# Patient Record
Sex: MALE | Race: WHITE | URBAN - NONMETROPOLITAN AREA
[De-identification: names, ages, dates, MRNs, and addresses within clinical notes are randomized per-mention and may not be internally consistent; named-entity substitution may affect disease eponyms.]

---

## 2017-03-06 ENCOUNTER — ANTI-COAG VISIT (OUTPATIENT)
Dept: FAMILY MEDICINE CLINIC | Age: 52
End: 2017-03-06

## 2017-04-17 ENCOUNTER — OFFICE VISIT (OUTPATIENT)
Dept: UROLOGY | Age: 52
End: 2017-04-17
Payer: COMMERCIAL

## 2017-04-17 VITALS
TEMPERATURE: 98.3 F | HEIGHT: 72 IN | HEART RATE: 87 BPM | BODY MASS INDEX: 28.71 KG/M2 | OXYGEN SATURATION: 95 % | WEIGHT: 212 LBS | SYSTOLIC BLOOD PRESSURE: 125 MMHG | DIASTOLIC BLOOD PRESSURE: 74 MMHG

## 2017-04-17 DIAGNOSIS — R31.29 MICROSCOPIC HEMATURIA: ICD-10-CM

## 2017-04-17 DIAGNOSIS — N40.2 PROSTATE NODULE: Primary | ICD-10-CM

## 2017-04-17 LAB
BACTERIA URINE, POC: NORMAL
BILIRUBIN URINE: 0 MG/DL
BLOOD, URINE: POSITIVE
CASTS URINE, POC: NORMAL
CLARITY: NORMAL
COLOR: NORMAL
CRYSTALS URINE, POC: NORMAL
EPI CELLS URINE, POC: NORMAL
GLUCOSE URINE: NORMAL
KETONES, URINE: NEGATIVE
LEUKOCYTE EST, POC: NEGATIVE
NITRITE, URINE: NEGATIVE
PH UA: 5.5 (ref 4.5–8)
PROTEIN UA: NEGATIVE
RBC URINE, POC: 0
SPECIFIC GRAVITY UA: 1.01 (ref 1–1.03)
UROBILINOGEN, URINE: NORMAL
WBC URINE, POC: NORMAL
YEAST URINE, POC: NORMAL

## 2017-04-17 PROCEDURE — 99244 OFF/OP CNSLTJ NEW/EST MOD 40: CPT | Performed by: UROLOGY

## 2017-04-17 PROCEDURE — 81000 URINALYSIS NONAUTO W/SCOPE: CPT | Performed by: UROLOGY

## 2017-04-17 RX ORDER — AMOXICILLIN 500 MG
CAPSULE ORAL
Refills: 6 | COMMUNITY
Start: 2017-03-29

## 2017-04-17 RX ORDER — VALSARTAN AND HYDROCHLOROTHIAZIDE 160; 25 MG/1; MG/1
TABLET ORAL
Refills: 11 | COMMUNITY
Start: 2017-03-21

## 2017-04-17 ASSESSMENT — ENCOUNTER SYMPTOMS
BLURRED VISION: 0
WHEEZING: 0
HEARTBURN: 0
DOUBLE VISION: 0
BACK PAIN: 0
SORE THROAT: 0
NAUSEA: 0
SHORTNESS OF BREATH: 0

## 2017-10-17 DIAGNOSIS — N40.2 PROSTATE NODULE: ICD-10-CM

## 2017-10-17 LAB — PROSTATE SPECIFIC ANTIGEN: 1.59 NG/ML (ref 0–4)

## 2017-10-23 ENCOUNTER — OFFICE VISIT (OUTPATIENT)
Dept: UROLOGY | Age: 52
End: 2017-10-23
Payer: COMMERCIAL

## 2017-10-23 VITALS
HEIGHT: 72 IN | DIASTOLIC BLOOD PRESSURE: 68 MMHG | WEIGHT: 216 LBS | OXYGEN SATURATION: 98 % | BODY MASS INDEX: 29.26 KG/M2 | TEMPERATURE: 98.2 F | HEART RATE: 97 BPM | SYSTOLIC BLOOD PRESSURE: 130 MMHG

## 2017-10-23 DIAGNOSIS — N40.2 PROSTATE NODULE: Primary | ICD-10-CM

## 2017-10-23 LAB
BACTERIA URINE, POC: NORMAL
BILIRUBIN URINE: 0 MG/DL
BLOOD, URINE: POSITIVE
CASTS URINE, POC: NORMAL
CLARITY: CLEAR
COLOR: YELLOW
CRYSTALS URINE, POC: NORMAL
EPI CELLS URINE, POC: NORMAL
GLUCOSE URINE: NORMAL
KETONES, URINE: NEGATIVE
LEUKOCYTE EST, POC: NORMAL
NITRITE, URINE: NEGATIVE
PH UA: 5 (ref 4.5–8)
PROTEIN UA: NEGATIVE
RBC URINE, POC: NORMAL
SPECIFIC GRAVITY UA: 1.02 (ref 1–1.03)
UROBILINOGEN, URINE: NORMAL
WBC URINE, POC: NORMAL
YEAST URINE, POC: NORMAL

## 2017-10-23 PROCEDURE — 81001 URINALYSIS AUTO W/SCOPE: CPT | Performed by: PHYSICIAN ASSISTANT

## 2017-10-23 PROCEDURE — 99213 OFFICE O/P EST LOW 20 MIN: CPT | Performed by: PHYSICIAN ASSISTANT

## 2017-10-23 ASSESSMENT — ENCOUNTER SYMPTOMS
BLURRED VISION: 0
DOUBLE VISION: 0
HEARTBURN: 0
SHORTNESS OF BREATH: 0
SORE THROAT: 0
NAUSEA: 0
WHEEZING: 0

## 2017-10-23 NOTE — PROGRESS NOTES
Alaina Baum is a 45 y.o. male who presents today   Chief Complaint   Patient presents with    Follow-up     I'm here for a f/u with PSA lab     Prostate Nodule:  Patient Who had seen Dr. Randolph Maria 04/17/17 who was referred by Dr. Alfonso Avendano for apparent prostate nodule on his exam last year's PSA was 1.1 and ana lilia to 2. 03/05/17 repeat PSA prior to this visit today is 1.59. Patient has had no voiding complaints or changes since last seen. Patient has not had a recent or previous prostate biopsy there is no personal family history of prostate cancer. Past Medical History:   Diagnosis Date    Cancer ([facility] [address]     leukemia    Hypertension        Past Surgical History:   Procedure Laterality Date    EYE SURGERY      TONSILLECTOMY         Current Outpatient Prescriptions   Medication Sig Dispense Refill    NIACIN FLUSH FREE 500 MG CAPS TK 1 C PO QD  6    valsartan-hydrochlorothiazide (DIOVAN-HCT) 160-25 MG per tablet TK 1 T PO QD  11    aspirin 81 MG tablet Take 81 mg by mouth daily      Omega-3 Fatty Acids (OMEGA 3 PO) Take by mouth       No current facility-administered medications for this visit. Allergies   Allergen Reactions    No Known Allergies        Social History     Social History    Marital status:      Spouse name: N/A    Number of children: N/A    Years of education: N/A     Social History Main Topics    Smoking status: Passive Smoke Exposure - Never Smoker    Smokeless tobacco: Never Used    Alcohol use No    Drug use: No    Sexual activity: Not Asked     Other Topics Concern    None     Social History Narrative    None       Family History   Problem Relation Age of Onset    Depression Mother     Diabetes Father     Stroke Maternal Uncle     Cancer Paternal Grandmother        REVIEW OF SYSTEMS:  Review of Systems   Constitutional: Negative for chills and fever. HENT: Negative for congestion and sore throat. Eyes: Negative for blurred vision and double vision.    Respiratory: Negative for shortness of breath and wheezing. Cardiovascular: Negative for chest pain and palpitations. Gastrointestinal: Negative for heartburn and nausea. Genitourinary: Negative for dysuria, flank pain, frequency, hematuria and urgency. Musculoskeletal: Negative for falls and neck pain. Skin: Negative for itching and rash. Neurological: Negative for dizziness and tingling. Endo/Heme/Allergies: Does not bruise/bleed easily. Psychiatric/Behavioral: The patient does not have insomnia. PHYSICAL EXAM:  /68   Pulse 97   Temp 98.2 °F (36.8 °C) (Temporal)   Ht 6' (1.829 m)   Wt 216 lb (98 kg)   SpO2 98%   BMI 29.29 kg/m²   Physical Exam   Constitutional: He is oriented to person, place, and time. He appears well-developed and well-nourished. No distress. HENT:   Head: Normocephalic. Mouth/Throat: No oropharyngeal exudate. Eyes: Left eye exhibits no discharge. No scleral icterus. Neck: Normal range of motion. Neck supple. No JVD present. No tracheal deviation present. No thyromegaly present. Cardiovascular: Normal rate and regular rhythm. Exam reveals no gallop and no friction rub. Pulmonary/Chest: Effort normal. No stridor. He has no rales. Abdominal: Soft. He exhibits no distension and no mass. There is no rebound and no guarding. Genitourinary: Rectum normal, prostate normal, testes normal and penis normal. Prostate is not enlarged. Musculoskeletal: Normal range of motion. He exhibits no edema. Neurological: He is alert and oriented to person, place, and time. No cranial nerve deficit. He exhibits normal muscle tone. Coordination normal.   Skin: Skin is warm and dry. He is not diaphoretic. No pallor. Psychiatric: He has a normal mood and affect. Nursing note and vitals reviewed.           DATA:  U/A:    Lab Results   Component Value Date    COLORU Yellow 10/23/2017    PROTEINU Negative 10/23/2017    PHUR 5.0 10/23/2017    RBCUA 0 04/17/2017    CLARITYU Clear

## 2018-02-16 ENCOUNTER — OFFICE VISIT (OUTPATIENT)
Dept: GASTROENTEROLOGY | Facility: CLINIC | Age: 53
End: 2018-02-16

## 2018-02-16 VITALS
HEART RATE: 72 BPM | WEIGHT: 217 LBS | SYSTOLIC BLOOD PRESSURE: 126 MMHG | OXYGEN SATURATION: 98 % | HEIGHT: 72 IN | DIASTOLIC BLOOD PRESSURE: 92 MMHG | BODY MASS INDEX: 29.39 KG/M2 | TEMPERATURE: 95 F

## 2018-02-16 DIAGNOSIS — Z83.71 FAMILY HX COLONIC POLYPS: ICD-10-CM

## 2018-02-16 DIAGNOSIS — Z86.010 HX OF COLONIC POLYP: Primary | ICD-10-CM

## 2018-02-16 DIAGNOSIS — I10 ESSENTIAL HYPERTENSION: ICD-10-CM

## 2018-02-16 DIAGNOSIS — Z80.0 FAMILY HX OF COLON CANCER: ICD-10-CM

## 2018-02-16 PROBLEM — Z83.719 FAMILY HX COLONIC POLYPS: Status: ACTIVE | Noted: 2018-02-16

## 2018-02-16 PROBLEM — Z86.0100 HX OF COLONIC POLYP: Status: ACTIVE | Noted: 2018-02-16

## 2018-02-16 PROCEDURE — S0285 CNSLT BEFORE SCREEN COLONOSC: HCPCS | Performed by: NURSE PRACTITIONER

## 2018-02-16 RX ORDER — POLYETHYLENE GLYCOL 3350, SODIUM CHLORIDE, SODIUM BICARBONATE, POTASSIUM CHLORIDE 420; 11.2; 5.72; 1.48 G/4L; G/4L; G/4L; G/4L
4000 POWDER, FOR SOLUTION ORAL SEE ADMIN INSTRUCTIONS
Qty: 4000 ML | Refills: 0 | Status: SHIPPED | OUTPATIENT
Start: 2018-02-16 | End: 2019-03-27

## 2018-02-16 RX ORDER — VALSARTAN AND HYDROCHLOROTHIAZIDE 160; 25 MG/1; MG/1
TABLET ORAL
COMMUNITY
Start: 2017-03-21

## 2018-02-16 RX ORDER — AMOXICILLIN 500 MG
CAPSULE ORAL
Refills: 5 | COMMUNITY
Start: 2018-01-16

## 2018-02-16 NOTE — PROGRESS NOTES
[facility] Gastroenterology    Primary Physician Lonny Simon MD    2/16/2018    Rico Enrique   1965      Chief Complaint   Patient presents with   • Colonoscopy       Subjective     HPI    Rico Enrique is a 52 y.o. male who presents as a referral for preventative maintenance. He has no complaints of nausea or vomiting. No change in bowels. No wt loss. No BRBPR. No melena. No abdominal pain.  Last colonoscopy was 2/2013, diverticulosis and hemorrhoids, recall rec 5 years.   The patient does have history of colon polyps. The patient  does not  have history of colon cancer.   There  Is  family history of colon polyps. There  Is  family history of colon cancer.     Past Medical History:   Diagnosis Date   • Abnormal liver enzymes    • Cholelithiasis    • Colon polyp    • Diverticulosis    • Enlarged prostate     dr rivera   • Hemorrhoids    • Hypertension    • Leukemia 2013    chronic lymphocytic, dr al    • Lymphoma 2013    small lymphoma, dr al        Past Surgical History:   Procedure Laterality Date   • COLONOSCOPY  02/15/2013   • EYE SURGERY     • TONSILLECTOMY AND ADENOIDECTOMY         Outpatient Prescriptions Marked as Taking for the 2/16/18 encounter (Office Visit) with DENNIS Bowden   Medication Sig Dispense Refill   • aspirin 81 MG tablet Take 81 mg by mouth.     • Glucosamine-Chondroitin (GLUCOSAMINE CHONDR COMPLEX PO) Take  by mouth Daily.     • NIACIN FLUSH FREE 500 MG capsule TK 1 C PO QD  5   • OMEGA-3 FATTY ACIDS PO Take  by mouth.     • valsartan-hydrochlorothiazide (DIOVAN-HCT) 160-25 MG per tablet TK 1 T PO QD         Allergies no known allergies    Social History     Social History   • Marital status:      Spouse name: N/A   • Number of children: N/A   • Years of education: N/A     Occupational History   • Not on file.     Social History Main Topics   • Smoking status: Never Smoker   • Smokeless tobacco: Never Used   • Alcohol use No      Comment: rare   • Drug  use: No   • Sexual activity: Defer     Other Topics Concern   • Not on file     Social History Narrative       Family History   Problem Relation Age of Onset   • Colon cancer Maternal Aunt    • Colon polyps Maternal Uncle    • Colon cancer Maternal Aunt    • Colon cancer Paternal Grandmother        Review of Systems   Constitutional: Negative for appetite change, chills, fatigue, fever and unexpected weight change.   HENT: Negative for sore throat and trouble swallowing.    Respiratory: Negative for cough, chest tightness, shortness of breath and wheezing.    Cardiovascular: Negative for chest pain and palpitations.   Gastrointestinal: Negative for abdominal distention, abdominal pain, anal bleeding, blood in stool, constipation, diarrhea, nausea, rectal pain and vomiting.        As mentioned in hpi   Genitourinary: Negative for difficulty urinating and hematuria.   Musculoskeletal: Negative for arthralgias and back pain.   Skin: Negative for color change and rash.   Neurological: Negative for dizziness, syncope, light-headedness and headaches.   Hematological: Positive for adenopathy.   Psychiatric/Behavioral: Negative for confusion. The patient is not nervous/anxious.        Objective     Vitals:    02/16/18 0826   BP: 126/92   Pulse: 72   Temp: 95 °F (35 °C)   SpO2: 98%     Last 2 weights    02/16/18  0826   Weight: 98.4 kg (217 lb)     Body mass index is 29.43 kg/(m^2).    Physical Exam   Constitutional: He appears well-developed and well-nourished. No distress.   HENT:   Head: Normocephalic and atraumatic.   Eyes: EOM are normal. No scleral icterus.   Neck: Neck supple. No JVD present.   Cardiovascular: Normal rate, regular rhythm and normal heart sounds.    Pulmonary/Chest: Effort normal and breath sounds normal. No stridor.   Abdominal: Soft. Bowel sounds are normal. He exhibits no distension and no mass. There is no tenderness. There is no rebound and no guarding.   Musculoskeletal: He exhibits no edema.    Neurological: He is alert.   Skin: Skin is warm and dry. No rash noted.   Psychiatric: He has a normal mood and affect. His behavior is normal.   Vitals reviewed.      Imaging Results (most recent)     None          Assessment/Plan     Rico was seen today for colonoscopy.    Diagnoses and all orders for this visit:    Hx of colonic polyp  -     Case Request; Standing  -     Case Request    Family hx of colon cancer  -     Case Request; Standing  -     Case Request    Family hx colonic polyps  -     Case Request; Standing  -     Case Request    Essential hypertension    Other orders  -     Implement Anesthesia Orders Day of Procedure; Standing  -     Obtain Informed Consent; Standing  -     polyethylene glycol-electrolytes (NULYTELY WITH FLAVOR PACKS) 420 g solution; Take 4,000 mL by mouth See Admin Instructions.      Plan for colonoscopy.  The patient was advised to take any blood pressure or heart  medications the morning of  procedure if that is when he/she normally takes.       Patient's BMI is above normal parameters. Follow-up plan includes:  no follow-up required.        COLONOSCOPY WITH ANESTHESIA (N/A)  All risks, benefits, alternatives, and indications of colonoscopy procedure have been discussed with the patient. Risks to include perforation of the colon requiring possible surgery or colostomy, risk of bleeding from biopsies or removal of colon tissue, possibility of missing a colon polyp or cancer, or adverse drug reaction.  Benefits to include the diagnosis and management of disease of the colon and rectum. Alternatives to include barium enema, radiographic evaluation, lab testing or no intervention. Pt verbalizes understanding and agrees.       DENNIS Lan      EMR Dragon/transcription disclaimer:  Much of this encounter note is electronic transcription/translation of spoken language to printed text.  The electronic translation of spoken language may be erroneous, or at times, nonsensical  words or phrases may be inadvertently transcribed.  Although I have reviewed the note for such errors, some may still exist.

## 2018-03-19 ENCOUNTER — TELEPHONE (OUTPATIENT)
Dept: GASTROENTEROLOGY | Facility: CLINIC | Age: 53
End: 2018-03-19

## 2018-03-19 ENCOUNTER — ANESTHESIA EVENT (OUTPATIENT)
Dept: GASTROENTEROLOGY | Facility: HOSPITAL | Age: 53
End: 2018-03-19

## 2018-03-19 ENCOUNTER — ANESTHESIA (OUTPATIENT)
Dept: GASTROENTEROLOGY | Facility: HOSPITAL | Age: 53
End: 2018-03-19

## 2018-03-19 ENCOUNTER — HOSPITAL ENCOUNTER (OUTPATIENT)
Facility: HOSPITAL | Age: 53
Setting detail: HOSPITAL OUTPATIENT SURGERY
Discharge: HOME | End: 2018-03-19
Attending: INTERNAL MEDICINE | Admitting: INTERNAL MEDICINE

## 2018-03-19 VITALS
SYSTOLIC BLOOD PRESSURE: 113 MMHG | HEART RATE: 81 BPM | HEIGHT: 72 IN | RESPIRATION RATE: 14 BRPM | WEIGHT: 216 LBS | BODY MASS INDEX: 29.26 KG/M2 | OXYGEN SATURATION: 97 % | TEMPERATURE: 97.9 F | DIASTOLIC BLOOD PRESSURE: 77 MMHG

## 2018-03-19 PROCEDURE — 25010000002 PROPOFOL 10 MG/ML EMULSION: Performed by: NURSE ANESTHETIST, CERTIFIED REGISTERED

## 2018-03-19 PROCEDURE — G0105 COLORECTAL SCRN; HI RISK IND: HCPCS | Performed by: INTERNAL MEDICINE

## 2018-03-19 RX ORDER — ONDANSETRON 2 MG/ML
4 INJECTION INTRAMUSCULAR; INTRAVENOUS ONCE AS NEEDED
Status: DISCONTINUED | OUTPATIENT
Start: 2018-03-19 | End: 2018-03-19 | Stop reason: HOSPADM

## 2018-03-19 RX ORDER — LIDOCAINE HYDROCHLORIDE 20 MG/ML
INJECTION, SOLUTION INFILTRATION; PERINEURAL AS NEEDED
Status: DISCONTINUED | OUTPATIENT
Start: 2018-03-19 | End: 2018-03-19 | Stop reason: SURG

## 2018-03-19 RX ORDER — PROPOFOL 10 MG/ML
VIAL (ML) INTRAVENOUS AS NEEDED
Status: DISCONTINUED | OUTPATIENT
Start: 2018-03-19 | End: 2018-03-19 | Stop reason: SURG

## 2018-03-19 RX ORDER — SODIUM CHLORIDE 9 MG/ML
500 INJECTION, SOLUTION INTRAVENOUS CONTINUOUS PRN
Status: DISCONTINUED | OUTPATIENT
Start: 2018-03-19 | End: 2018-03-19 | Stop reason: HOSPADM

## 2018-03-19 RX ORDER — SODIUM CHLORIDE 0.9 % (FLUSH) 0.9 %
3 SYRINGE (ML) INJECTION AS NEEDED
Status: DISCONTINUED | OUTPATIENT
Start: 2018-03-19 | End: 2018-03-19 | Stop reason: HOSPADM

## 2018-03-19 RX ADMIN — SODIUM CHLORIDE 500 ML: 9 INJECTION, SOLUTION INTRAVENOUS at 07:50

## 2018-03-19 RX ADMIN — LIDOCAINE HYDROCHLORIDE 100 MG: 20 INJECTION, SOLUTION INFILTRATION; PERINEURAL at 09:09

## 2018-03-19 RX ADMIN — LIDOCAINE HYDROCHLORIDE 0.5 ML: 10 INJECTION, SOLUTION EPIDURAL; INFILTRATION; INTRACAUDAL; PERINEURAL at 07:50

## 2018-03-19 RX ADMIN — SODIUM CHLORIDE: 9 INJECTION, SOLUTION INTRAVENOUS at 08:55

## 2018-03-19 RX ADMIN — PROPOFOL 200 MG: 10 INJECTION, EMULSION INTRAVENOUS at 09:09

## 2018-03-19 RX ADMIN — PROPOFOL 200 MG: 10 INJECTION, EMULSION INTRAVENOUS at 09:15

## 2018-03-19 NOTE — H&P
[facility] Gastroenterology  Pre Procedure History & Physical    Chief Complaint:   Colon polyps    Subjective     HPI:   The patient has a history of colon polyps who presents for exam.    Past Medical History:   Past Medical History:   Diagnosis Date   • Abnormal liver enzymes    • Cholelithiasis    • Colon polyp    • Diverticulosis    • Enlarged prostate     dr rivera   • Hemorrhoids    • Hypertension    • Leukemia 2013    chronic lymphocytic, dr al    • Lymphoma 2013    small lymphoma, dr al    • PONV (postoperative nausea and vomiting)        Past Surgical History:  Past Surgical History:   Procedure Laterality Date   • COLONOSCOPY  02/15/2013   • EYE SURGERY     • TONSILLECTOMY AND ADENOIDECTOMY         Family History:  Family History   Problem Relation Age of Onset   • Colon cancer Maternal Aunt    • Colon polyps Maternal Uncle    • Colon cancer Maternal Aunt    • Colon cancer Paternal Grandmother        Social History:   reports that he has never smoked. He has never used smokeless tobacco. He reports that he does not drink alcohol or use drugs.    Medications:   Prior to Admission medications    Medication Sig Start Date End Date Taking? Authorizing Provider   aspirin 81 MG tablet Take 81 mg by mouth.   Yes Historical Provider, MD   Glucosamine-Chondroitin (GLUCOSAMINE CHONDR COMPLEX PO) Take  by mouth Daily.   Yes Historical Provider, MD   NIACIN FLUSH FREE 500 MG capsule TK 1 C PO QD 1/16/18  Yes Historical Provider, MD   OMEGA-3 FATTY ACIDS PO Take  by mouth.   Yes Historical Provider, MD   polyethylene glycol-electrolytes (NULYTELY WITH FLAVOR PACKS) 420 g solution Take 4,000 mL by mouth See Admin Instructions. 2/16/18  Yes DENNIS Bowden   valsartan-hydrochlorothiazide (DIOVAN-HCT) 160-25 MG per tablet TK 1 T PO QD 3/21/17  Yes Historical Provider, MD       Allergies:  Review of patient's allergies indicates no known allergies.    ROS:    General: Weight stable  Resp: No  "SOA  Cardiovascular: No CP    Objective     Blood pressure 120/79, pulse 98, temperature 97.9 °F (36.6 °C), temperature source Temporal Artery , resp. rate 18, height 182.9 cm (72\"), weight 98 kg (216 lb), SpO2 96 %.    Physical Exam   Constitutional: Pt is oriented to person, place, and in no distress.   HENT: Mouth/Throat: Oropharynx is clear.   Cardiovascular: Normal rate, regular rhythm.    Pulmonary/Chest: Effort normal. No respiratory distress. No  wheezes.   Abdominal: Soft. Non-distended.  Skin: Skin is warm and dry.   Psychiatric: Mood, memory, affect and judgment appear normal.     Assessment/Plan     Diagnosis:  Colon polyps    Anticipated Surgical Procedure:  Colonoscopy    The risks, benefits, and alternatives of this procedure have been discussed with the patient or the responsible party- the patient understands and agrees to proceed.      EMR Dragon/transcription disclaimer:  Much of this encounter note is electronic transcription/translation of spoken language to printed text.  The electronic translation of spoken language may be erroneous, or at times, nonsensical words or phrases may be inadvertently transcribed.  Although I have reviewed the note for such errors, some may still exist.  "

## 2018-03-19 NOTE — ANESTHESIA POSTPROCEDURE EVALUATION
"Patient: Rico Enrique    Procedure Summary     Date:  03/19/18 Room / Location:  [facility] ENDOSCOPY 4 / [facility] ENDOSCOPY    Anesthesia Start:  0855 Anesthesia Stop:  0925    Procedure:  COLONOSCOPY WITH ANESTHESIA (N/A ) Diagnosis:       Family hx colonic polyps      Family hx of colon cancer      Hx of colonic polyp      (Family hx colonic polyps [Z83.71])      (Family hx of colon cancer [Z80.0])      (Hx of colonic polyp [Z86.010])    Surgeon:  Leonidas Ibanez MD Provider:  Kalpesh Arambula CRNA    Anesthesia Type:  general ASA Status:  2          Anesthesia Type: general  Last vitals  BP   120/79 (03/19/18 0738)   Temp   97.9 °F (36.6 °C) (03/19/18 0738)   Pulse   98 (03/19/18 0738)   Resp   18 (03/19/18 0738)     SpO2   96 % (03/19/18 0738)     Post Anesthesia Care and Evaluation    Patient location during evaluation: PACU  Patient participation: complete - patient participated  Level of consciousness: awake and alert  Pain management: adequate  Airway patency: patent  Anesthetic complications: No anesthetic complications    Cardiovascular status: acceptable  Respiratory status: acceptable  Hydration status: acceptable    Comments: Blood pressure 120/79, pulse 98, temperature 97.9 °F (36.6 °C), temperature source Temporal Artery , resp. rate 18, height 182.9 cm (72\"), weight 98 kg (216 lb), SpO2 96 %.    Pt discharged from PACU based on gail score >8      "

## 2018-03-19 NOTE — ANESTHESIA PREPROCEDURE EVALUATION
Anesthesia Evaluation     Patient summary reviewed   no history of anesthetic complications:  NPO Solid Status: > 8 hours             Airway   Mallampati: II  TM distance: >3 FB  Neck ROM: full  Dental          Pulmonary - negative pulmonary ROS   Cardiovascular   Exercise tolerance: excellent (>7 METS)    (+) hypertension,       Neuro/Psych- negative ROS  GI/Hepatic/Renal/Endo - negative ROS     Musculoskeletal     Abdominal    Substance History      OB/GYN          Other      history of cancer (leukemia)                    Anesthesia Plan    ASA 2     general     intravenous induction   Anesthetic plan and risks discussed with patient.

## 2018-04-20 DIAGNOSIS — N40.2 PROSTATE NODULE: ICD-10-CM

## 2018-04-20 LAB — PROSTATE SPECIFIC ANTIGEN: 2.07 NG/ML (ref 0–4)

## 2018-04-30 ENCOUNTER — OFFICE VISIT (OUTPATIENT)
Dept: UROLOGY | Age: 53
End: 2018-04-30
Payer: COMMERCIAL

## 2018-04-30 VITALS
BODY MASS INDEX: 29.53 KG/M2 | HEIGHT: 72 IN | SYSTOLIC BLOOD PRESSURE: 150 MMHG | HEART RATE: 96 BPM | DIASTOLIC BLOOD PRESSURE: 94 MMHG | WEIGHT: 218 LBS

## 2018-04-30 DIAGNOSIS — N40.1 BENIGN PROSTATIC HYPERPLASIA WITH LOWER URINARY TRACT SYMPTOMS, SYMPTOM DETAILS UNSPECIFIED: ICD-10-CM

## 2018-04-30 DIAGNOSIS — N40.2 PROSTATE NODULE: Primary | ICD-10-CM

## 2018-04-30 LAB
BACTERIA URINE, POC: ABNORMAL
BILIRUBIN URINE: 0 MG/DL
BLOOD, URINE: POSITIVE
CASTS URINE, POC: ABNORMAL
CLARITY: CLEAR
COLOR: YELLOW
CRYSTALS URINE, POC: ABNORMAL
EPI CELLS URINE, POC: ABNORMAL
GLUCOSE URINE: ABNORMAL
KETONES, URINE: NEGATIVE
LEUKOCYTE EST, POC: ABNORMAL
NITRITE, URINE: NEGATIVE
PH UA: 6.5 (ref 4.5–8)
PROTEIN UA: NEGATIVE
RBC URINE, POC: ABNORMAL
SPECIFIC GRAVITY UA: 1.01 (ref 1–1.03)
UROBILINOGEN, URINE: NORMAL
WBC URINE, POC: ABNORMAL
YEAST URINE, POC: ABNORMAL

## 2018-04-30 PROCEDURE — 81001 URINALYSIS AUTO W/SCOPE: CPT | Performed by: UROLOGY

## 2018-04-30 PROCEDURE — 99213 OFFICE O/P EST LOW 20 MIN: CPT | Performed by: UROLOGY

## 2018-04-30 ASSESSMENT — ENCOUNTER SYMPTOMS
EYE DISCHARGE: 0
EYE REDNESS: 0
WHEEZING: 0
SHORTNESS OF BREATH: 0
NAUSEA: 0
HEARTBURN: 0

## 2019-01-04 ENCOUNTER — TELEPHONE (OUTPATIENT)
Dept: OTOLARYNGOLOGY | Facility: CLINIC | Age: 54
End: 2019-01-04

## 2019-02-06 ENCOUNTER — OFFICE VISIT (OUTPATIENT)
Dept: OTOLARYNGOLOGY | Facility: CLINIC | Age: 54
End: 2019-02-06

## 2019-02-06 VITALS
DIASTOLIC BLOOD PRESSURE: 92 MMHG | RESPIRATION RATE: 12 BRPM | HEART RATE: 92 BPM | HEIGHT: 72 IN | WEIGHT: 219 LBS | TEMPERATURE: 98.1 F | SYSTOLIC BLOOD PRESSURE: 133 MMHG | BODY MASS INDEX: 29.66 KG/M2

## 2019-02-06 DIAGNOSIS — R59.0 CERVICAL LYMPHADENOPATHY: Primary | ICD-10-CM

## 2019-02-06 PROCEDURE — 99203 OFFICE O/P NEW LOW 30 MIN: CPT | Performed by: OTOLARYNGOLOGY

## 2019-02-06 RX ORDER — LOVASTATIN 20 MG/1
40 TABLET ORAL NIGHTLY
COMMUNITY

## 2019-02-06 NOTE — PROGRESS NOTES
Rico Hernandez MD     Chief Complaint   Patient presents with   • Lymphoma       History of Present Illness   Rico Enrique is a  53 y.o. male who presents for evaluation.  About a month ago he had a severe sore throat with dysphasia.  He did not have any airway complaints.  He had lymphadenopathy that developed from this.  He has some mild persisting lymphadenopathy in the right level 2 region.  He has a history of CLL and is being followed by Dr. Al.     Review of Systems  Reviewed per patient intake note    Past History:  Past Medical History:   Diagnosis Date   • Abnormal liver enzymes    • Cholelithiasis    • Colon polyp    • Diverticulosis    • Enlarged prostate     dr rivera   • Hemorrhoids    • Hypertension    • Leukemia (CMS/HCC) 2013    chronic lymphocytic, dr al    • Lymphoma (CMS/HCC) 2013    small lymphoma, dr al    • PONV (postoperative nausea and vomiting)      Past Surgical History:   Procedure Laterality Date   • COLONOSCOPY  02/15/2013   • COLONOSCOPY N/A 3/19/2018    Procedure: COLONOSCOPY WITH ANESTHESIA;  Surgeon: Leonidas Ibanez MD;  Location: [facility] ENDOSCOPY;  Service: Gastroenterology   • EYE SURGERY     • TONSILLECTOMY AND ADENOIDECTOMY       Family History   Problem Relation Age of Onset   • Colon cancer Maternal Aunt    • Colon polyps Maternal Uncle    • Colon cancer Maternal Aunt    • Colon cancer Paternal Grandmother      Social History     Tobacco Use   • Smoking status: Never Smoker   • Smokeless tobacco: Never Used   Substance Use Topics   • Alcohol use: No     Comment: rare   • Drug use: No     Outpatient Medications Marked as Taking for the 2/6/19 encounter (Office Visit) with Rico Hernandez MD   Medication Sig Dispense Refill   • aspirin 81 MG tablet Take 81 mg by mouth.     • Glucosamine-Chondroitin (GLUCOSAMINE CHONDR COMPLEX PO) Take  by mouth Daily.     • lovastatin (MEVACOR) 20 MG tablet Take 40 mg by mouth Every Night.     • NIACIN FLUSH FREE 500 MG  capsule TK 1 C PO QD  5   • OMEGA-3 FATTY ACIDS PO Take  by mouth.     • valsartan-hydrochlorothiazide (DIOVAN-HCT) 160-25 MG per tablet TK 1 T PO QD       Allergies:  Patient has no known allergies.    Vital Signs:   Temp:  [98.1 °F (36.7 °C)] 98.1 °F (36.7 °C)  Heart Rate:  [92] 92  Resp:  [12] 12  BP: (133)/(92) 133/92    Physical Exam:   CONSTITUTIONAL: well nourished, well-developed, alert, oriented, in no acute distress   COMMUNICATION AND VOICE: able to communicate normally, normal voice quality  HEAD: normocephalic, no lesions, atraumatic, no tenderness, no masses   FACE: appearance normal, no lesions, no tenderness, no deformities, facial motion symmetric  SALIVARY GLANDS: parotid glands with no tenderness, no swelling, no masses, submandibular glands with normal size, nontender  EYES: ocular motility normal, eyelids normal, orbits normal, no proptosis, conjunctiva normal , pupils equal, round  HEARING: response to conversational voice normal bilaterally   EXTERNAL EARS: auricles without lesions  EXTERNAL EAR CANALS: normal ear canals without stenosis or significant cerumen  TYMPANIC MEMBRANES: tympanic membrane appearance normal, no lesions, no perforation, normal mobility, no fluid  EXTERNAL NOSE: structure normal, no tenderness on palpation, no nasal discharge, no lesions, no evidence of trauma, nostrils patent  INTRANASAL EXAM: nasal mucosa normal, vestibule within normal limits, inferior turbinate normal,  nasal septum without overt anterior deviation  NASOPHARYNX: nasopharyngeal mucosa, adenoids within normal limits  LIPS: structure normal, no tenderness on palpation, no lesions, no evidence of trauma  TEETH: dentition within normal limits for age  GUMS: gingivae healthy  ORAL MUCOSA: oral mucosa normal, no mucosal lesions  FLOOR OF MOUTH: Warthin's duct patent, mucosa normal  TONGUE: lingual mucosa normal without lesions, normal tongue mobility  PALATE: soft and hard palates with normal mucosa and  structure  OROPHARYNX: oropharyngeal mucosa normal, tonsils absent with normal fossas  HYPOPHARYNX: hypopharyngeal mucosa normal  LARYNX: epiglottis and arytenoid cartilage within normal limits, vocal cord mucosa normal with normal mobility   NECK: neck appearance normal, no masses or tenderness  THYROID: no overt thyromegaly, no tenderness, nodules or mass present on palpation, position midline   LYMPH NODES: There is a benign-appearing well-circumscribed right level 2 lymph node is approximately 1.5 cm.  There is no suspicious adenopathy present  CHEST/RESPIRATORY: respiratory effort normal, normal breath sounds  CARDIOVASCULAR: rate and rhythm normal, extremities without cyanosis or edema, no overt jugulovenous distension present  NEUROLOGIC/PSYCHIATRIC: oriented appropriately for age, mood normal, affect appropriate, cranial nerves intact grossly unless specifically mentioned above     RESULTS REVIEW:    I have reviewed the patients old records in the chart.  The case was discussed with Dr San    Assessment   1. Cervical lymphadenopathy        Plan    Conservative management.  Will reevaluate in 4-6 weeks and if he still has symptoms or if there is enlargement, will consider CT scan of the neck to reevaluate.  However I feel this is likely reactive lymphadenopathy from his previous upper respiratory infection.    Return in about 6 weeks (around 3/20/2019).    Rico Hernandez MD  02/06/19  3:17 PM

## 2019-02-06 NOTE — PROGRESS NOTES
Keya Arnold   Patient Intake Note    Review of Systems  Review of Systems   Constitutional: Positive for fever. Negative for chills and fatigue.   HENT:        See HPI     Eyes: Negative for pain, discharge and itching.   Respiratory: Negative for cough, choking and shortness of breath.    Gastrointestinal: Negative for diarrhea, nausea and vomiting.   Musculoskeletal: Positive for neck pain and neck stiffness.   Neurological: Negative for dizziness, light-headedness and headaches.   Hematological: Does not bruise/bleed easily.   Psychiatric/Behavioral: Positive for sleep disturbance.   All other systems reviewed and are negative.      QUALITY MEASURES    Body Mass Index Screening and Follow-Up Plan  Body mass index is 29.7 kg/m².  Patient's Body mass index is 29.7 kg/m². BMI is above normal parameters. Recommendations include: referral to primary care.    Tobacco Use: Screening and Cessation Intervention  Social History    Tobacco Use      Smoking status: Never Smoker      Smokeless tobacco: Never Used        Keay Arnold  2/6/2019  2:16 PM

## 2019-03-05 ENCOUNTER — TRANSCRIBE ORDERS (OUTPATIENT)
Dept: ONCOLOGY | Facility: CLINIC | Age: 54
End: 2019-03-05

## 2019-03-05 DIAGNOSIS — C91.10 CHRONIC LYMPHOCYTIC LEUK OF B-CELL TYPE NOT ACHIEVE REMIS (HCC): Primary | ICD-10-CM

## 2019-03-21 ENCOUNTER — LAB (OUTPATIENT)
Dept: LAB | Facility: HOSPITAL | Age: 54
End: 2019-03-21

## 2019-03-21 DIAGNOSIS — C91.10 CHRONIC LYMPHOCYTIC LEUK OF B-CELL TYPE NOT ACHIEVE REMIS (HCC): ICD-10-CM

## 2019-03-21 LAB
DEPRECATED RDW RBC AUTO: 43.6 FL (ref 40–54)
ERYTHROCYTE [DISTWIDTH] IN BLOOD BY AUTOMATED COUNT: 13.2 % (ref 12–15)
HCT VFR BLD AUTO: 42.7 % (ref 40–52)
HGB BLD-MCNC: 14.7 G/DL (ref 14–18)
LYMPHOCYTES # BLD MANUAL: 35.38 10*3/MM3 (ref 0.72–4.86)
LYMPHOCYTES NFR BLD MANUAL: 90 % (ref 15–45)
MCH RBC QN AUTO: 31.1 PG (ref 28–32)
MCHC RBC AUTO-ENTMCNC: 34.4 G/DL (ref 33–36)
MCV RBC AUTO: 90.5 FL (ref 82–95)
NEUTROPHILS # BLD AUTO: 3.93 10*3/MM3 (ref 1.87–8.4)
NEUTROPHILS NFR BLD MANUAL: 10 % (ref 39–78)
NRBC BLD AUTO-RTO: 0 /100 WBC (ref 0–0)
PLAT MORPH BLD: NORMAL
PLATELET # BLD AUTO: 251 10*3/MM3 (ref 130–400)
PMV BLD AUTO: 9.6 FL (ref 6–12)
POIKILOCYTOSIS BLD QL SMEAR: ABNORMAL
RBC # BLD AUTO: 4.72 10*6/MM3 (ref 4.8–5.9)
SMUDGE CELLS BLD QL SMEAR: ABNORMAL
WBC NRBC COR # BLD: 39.31 10*3/MM3 (ref 4.8–10.8)

## 2019-03-21 PROCEDURE — 85007 BL SMEAR W/DIFF WBC COUNT: CPT

## 2019-03-21 PROCEDURE — 85025 COMPLETE CBC W/AUTO DIFF WBC: CPT

## 2019-03-21 PROCEDURE — 36415 COLL VENOUS BLD VENIPUNCTURE: CPT

## 2019-03-27 ENCOUNTER — OFFICE VISIT (OUTPATIENT)
Dept: OTOLARYNGOLOGY | Facility: CLINIC | Age: 54
End: 2019-03-27

## 2019-03-27 VITALS
DIASTOLIC BLOOD PRESSURE: 93 MMHG | TEMPERATURE: 97.8 F | SYSTOLIC BLOOD PRESSURE: 136 MMHG | WEIGHT: 220 LBS | HEIGHT: 72 IN | HEART RATE: 90 BPM | BODY MASS INDEX: 29.8 KG/M2

## 2019-03-27 DIAGNOSIS — R59.0 CERVICAL LYMPHADENOPATHY: Primary | ICD-10-CM

## 2019-03-27 PROCEDURE — 99213 OFFICE O/P EST LOW 20 MIN: CPT | Performed by: OTOLARYNGOLOGY

## 2019-03-27 NOTE — PROGRESS NOTES
Keya Arnold   Patient Intake Note    Review of Systems  Review of Systems   Constitutional: Positive for fatigue and fever. Negative for chills.   HENT:        See HPI   Eyes: Negative for pain, discharge and itching.   Respiratory: Negative for cough, choking and shortness of breath.    Gastrointestinal: Negative for diarrhea, nausea and vomiting.   Musculoskeletal: Negative for neck pain and neck stiffness.   Neurological: Negative for dizziness, light-headedness and headaches.   Psychiatric/Behavioral: Positive for sleep disturbance.   All other systems reviewed and are negative.      QUALITY MEASURES    Body Mass Index Screening and Follow-Up Plan  Body mass index is 29.83 kg/m².  Patient's Body mass index is 29.83 kg/m². BMI is above normal parameters. Recommendations include: referral to primary care.    Tobacco Use: Screening and Cessation Intervention  Social History    Tobacco Use      Smoking status: Never Smoker      Smokeless tobacco: Never Used        Keya Arnold  3/27/2019  4:04 PM

## 2019-03-27 NOTE — PROGRESS NOTES
Rico Hernandez MD     Chief Complaint   Patient presents with   • Follow-up        History of Present Illness  Rico Enrique is a  53 y.o. male who is here for follow up. He has not had growth of lymph nodes    Review of Systems  Reviewed per patient intake note    Past History:  Past medical and surgical history, family history and social history reviewed and updated when appropriate.  Current medications and allergies reviewed and updated when appropriate.  Allergies:  Patient has no known allergies.        Vital Signs:   Temp:  [97.8 °F (36.6 °C)] 97.8 °F (36.6 °C)  Heart Rate:  [90] 90  BP: (136)/(93) 136/93    Physical Exam:  CONSTITUTIONAL: well nourished, well-developed, alert, oriented, in no acute distress   COMMUNICATION AND VOICE: able to communicate normally, normal voice quality  HEAD: normocephalic, no lesions, atraumatic, no tenderness, no masses   FACE: appearance normal, no lesions, no tenderness, no deformities, facial motion symmetric  EYES: ocular motility normal, eyelids normal, orbits normal, no proptosis, conjunctiva normal , pupils equal, round  HEARING: response to conversational voice normal bilaterally   EXTERNAL EARS: auricles without lesions  EXTERNAL NOSE: structure normal, no tenderness on palpation, no nasal discharge, no lesions, no evidence of trauma, nostrils patent  LIPS: structure normal, no tenderness on palpation, no lesions, no evidence of trauma  NECK: neck appearance normal  LYMPHATIC: prominent right level II node 1.5 cm benign appearing  CHEST/RESPIRATORY: respiratory effort normal  CARDIOVASCULAR: extremities without cyanosis or edema, no overt jugulovenous distension present  NEUROLOGIC/PSYCHIATRIC: oriented appropriately for age, mood normal, affect appropriate, cranial nerves intact grossly unless specifically mentioned above          Assessment   1. Cervical lymphadenopathy        Plan    Medical and surgical options were discussed including observation and  biopsy. Risks, benefits and alternatives were discussed and questions were answered. After considering the options, the patient decided to proceed with observation.    Return in about 4 months (around 7/27/2019) for follow up with midlevel when I am in clinic.    Rico Hernandez MD  03/27/19  4:30 PM

## 2019-05-17 DIAGNOSIS — N40.1 BENIGN PROSTATIC HYPERPLASIA WITH LOWER URINARY TRACT SYMPTOMS, SYMPTOM DETAILS UNSPECIFIED: ICD-10-CM

## 2019-05-17 LAB — PROSTATE SPECIFIC ANTIGEN: 1.98 NG/ML (ref 0–4)

## 2019-05-23 ENCOUNTER — OFFICE VISIT (OUTPATIENT)
Dept: UROLOGY | Age: 54
End: 2019-05-23
Payer: COMMERCIAL

## 2019-05-23 VITALS — BODY MASS INDEX: 29.26 KG/M2 | HEIGHT: 72 IN | TEMPERATURE: 98.6 F | WEIGHT: 216 LBS

## 2019-05-23 DIAGNOSIS — N40.1 BENIGN PROSTATIC HYPERPLASIA WITH LOWER URINARY TRACT SYMPTOMS, SYMPTOM DETAILS UNSPECIFIED: Primary | ICD-10-CM

## 2019-05-23 DIAGNOSIS — R31.29 MICROHEMATURIA: ICD-10-CM

## 2019-05-23 LAB
BACTERIA URINE, POC: 0
BILIRUBIN URINE: 0 MG/DL
BLOOD, URINE: POSITIVE
CASTS URINE, POC: 0
CLARITY: CLEAR
COLOR: YELLOW
CRYSTALS URINE, POC: 0
EPI CELLS URINE, POC: 0
GLUCOSE URINE: ABNORMAL
KETONES, URINE: NEGATIVE
LEUKOCYTE EST, POC: ABNORMAL
NITRITE, URINE: NEGATIVE
PH UA: 5 (ref 4.5–8)
PROTEIN UA: NEGATIVE
RBC URINE, POC: 2
SPECIFIC GRAVITY UA: 1.03 (ref 1–1.03)
UROBILINOGEN, URINE: NORMAL
WBC URINE, POC: 1
YEAST URINE, POC: 0

## 2019-05-23 PROCEDURE — 81001 URINALYSIS AUTO W/SCOPE: CPT | Performed by: UROLOGY

## 2019-05-23 PROCEDURE — 99213 OFFICE O/P EST LOW 20 MIN: CPT | Performed by: UROLOGY

## 2019-05-23 RX ORDER — LOVASTATIN 20 MG/1
TABLET ORAL
Refills: 3 | COMMUNITY
Start: 2019-05-09

## 2019-05-23 ASSESSMENT — ENCOUNTER SYMPTOMS
CONSTIPATION: 0
SINUS PRESSURE: 0
EYE PAIN: 0
BLOOD IN STOOL: 0
FACIAL SWELLING: 0
VOMITING: 0
BACK PAIN: 0
NAUSEA: 0
ABDOMINAL PAIN: 0
EYE DISCHARGE: 0
EYE REDNESS: 0
ABDOMINAL DISTENTION: 0
SORE THROAT: 0
SHORTNESS OF BREATH: 0
COUGH: 0
RHINORRHEA: 0
WHEEZING: 0
COLOR CHANGE: 0
DIARRHEA: 0

## 2019-05-23 NOTE — LETTER
[facility]  [address] [place] [address] Drive, [address] [address]  Phone: [phone]  Fax: [phone]    Shyann Pinzon MD        May 23, 2019     Mary Evans, [address]      Patient: Selena Nam   MR Number: [id number]   YOB: 1965   Date of Visit: 5/23/2019       Dear Provider: Thank you for referring Divya Whitley to me for evaluation. Below are the relevant portions of my assessment and plan of care. If you have questions, please do not hesitate to call me. I look forward to following Betsy Burgess along with you.     Sincerely,        Shyann Pinzon MD

## 2019-05-23 NOTE — PROGRESS NOTES
John Irvin is a 52 y.o. male who presents today   Chief Complaint   Patient presents with    1 Year Follow Up     I'm here for a yearly f/u on BPH with PSA drawn       Benign Prostatic Hypertrophy  Patient complains of lower urinary tract symptoms. He reports frequency, intermittency, nocturia one time a night and weak stream. He denies straining and urgency. Patient states symptoms are of mild severity. Onset of symptoms was a few years ago and was gradual in onset. His AUA Symptom Score is, 7/35 manifested as irritative symptoms including nocturia and obstructive symptoms including weak stream, postvoid dribbling. He has no personal history and no family history of prostate cancer. He reports a history of no complicating symptoms. He denies flank pain, gross hematuria, kidney stones and recurrent UTI. Is on no medications. He had tried tamsulosin the past but stopped taking it due to side effects    Patient has long-standing history of microscopic hematuria. He's had prior workups which are negative. This is been unchanged    Past Medical History:   Diagnosis Date    Cancer ([facility] [address]     leukemia    Hypertension        Past Surgical History:   Procedure Laterality Date    EYE SURGERY      TONSILLECTOMY         Current Outpatient Medications   Medication Sig Dispense Refill    lovastatin (MEVACOR) 20 MG tablet   3    NIACIN FLUSH FREE 500 MG CAPS TK 1 C PO QD  6    valsartan-hydrochlorothiazide (DIOVAN-HCT) 160-25 MG per tablet TK 1 T PO QD  11    Omega-3 Fatty Acids (OMEGA 3 PO) Take by mouth       No current facility-administered medications for this visit.         Allergies   Allergen Reactions    No Known Allergies        Social History     Socioeconomic History    Marital status:      Spouse name: None    Number of children: None    Years of education: None    Highest education level: None   Occupational History    None   Social Needs    Financial resource strain: None    Food insecurity: Worry: None     Inability: None    Transportation needs:     Medical: None     Non-medical: None   Tobacco Use    Smoking status: Passive Smoke Exposure - Never Smoker    Smokeless tobacco: Never Used   Substance and Sexual Activity    Alcohol use: No    Drug use: No    Sexual activity: None   Lifestyle    Physical activity:     Days per week: None     Minutes per session: None    Stress: None   Relationships    Social connections:     Talks on phone: None     Gets together: None     Attends [employer] service: None     Active member of club or organization: None     Attends meetings of clubs or organizations: None     Relationship status: None    Intimate partner violence:     Fear of current or ex partner: None     Emotionally abused: None     Physically abused: None     Forced sexual activity: None   Other Topics Concern    None   Social History Narrative    None       Family History   Problem Relation Age of Onset    Depression Mother     Diabetes Father     Stroke Maternal Uncle     Cancer Paternal Grandmother        REVIEW OF SYSTEMS:  Review of Systems   Constitutional: Negative for activity change, chills, fatigue and fever. HENT: Negative for congestion, ear discharge, ear pain, facial swelling, mouth sores, rhinorrhea, sinus pressure and sore throat. Eyes: Negative for pain, discharge and redness. Respiratory: Negative for cough, shortness of breath and wheezing. Cardiovascular: Negative for chest pain, palpitations and leg swelling. Gastrointestinal: Negative for abdominal distention, abdominal pain, blood in stool, constipation, diarrhea, nausea and vomiting. Endocrine: Negative for polydipsia, polyphagia and polyuria. Genitourinary: Negative for decreased urine volume, difficulty urinating, dysuria, enuresis, flank pain, frequency, genital sores, hematuria and urgency.    Musculoskeletal: Negative for back pain, gait problem, joint swelling, neck pain and neck stiffness. Skin: Negative for color change, rash and wound. Allergic/Immunologic: Negative for environmental allergies and immunocompromised state. Neurological: Negative for dizziness, syncope, weakness, light-headedness, numbness and headaches. Psychiatric/Behavioral: Negative for agitation, confusion, dysphoric mood, self-injury, sleep disturbance and suicidal ideas. The patient is not hyperactive. PHYSICAL EXAM:  Temp 98.6 °F (37 °C) (Temporal)   Ht 6' (1.829 m)   Wt 216 lb (98 kg)   BMI 29.29 kg/m²   Physical Exam   Constitutional: He is oriented to person, place, and time. He appears well-developed and well-nourished. No distress. HENT:   Head: Normocephalic and atraumatic. Nose: Nose normal.   Eyes: Pupils are equal, round, and reactive to light. Conjunctivae and EOM are normal. No scleral icterus. Neck: Normal range of motion. Neck supple. No tracheal deviation present. Cardiovascular: Normal rate, regular rhythm and intact distal pulses. Pulmonary/Chest: Effort normal and breath sounds normal. No stridor. He exhibits no tenderness. Abdominal: Soft. Bowel sounds are normal. He exhibits no distension and no mass. There is no tenderness. Genitourinary: Rectum normal, testes normal and penis normal. Prostate is enlarged (30 g smooth no nodularity). Musculoskeletal: Normal range of motion. He exhibits no edema or tenderness. Lymphadenopathy:     He has no cervical adenopathy. Neurological: He is alert and oriented to person, place, and time. Skin: Skin is warm and dry. No erythema. Psychiatric: He has a normal mood and affect.  His behavior is normal. Judgment normal.           DATA:    Results for orders placed or performed in visit on 05/23/19   POCT Urinalysis Dipstick w/ Micro (Auto)   Result Value Ref Range    Color, UA Yellow     Clarity, UA Clear Clear    Glucose, Ur neg     Bilirubin Urine 0 mg/dL    Ketones, Urine Negative     Specific Gravity, UA 1.030 1.005 - 1. 030    Blood, Urine Positive     pH, UA 5.0 4.5 - 8.0    Protein, UA Negative Negative    Nitrite, Urine Negative     Leukocytes, UA neg     Urobilinogen, Urine Normal     rbc urine, poc 2     wbc urine, poc 1     bacteria urine, poc 0     yeast urine, poc 0     casts urine, poc 0     epi cells urine, poc 0     crystals urine, poc 0      Lab Results   Component Value Date    PSA 1.98 05/17/2019    PSA 2.07 04/20/2018    PSA 1.59 10/17/2017     1. Benign prostatic hyperplasia with lower urinary tract symptoms, symptom details unspecified  Symptoms are unchanged continue annual check  - POCT Urinalysis Dipstick w/ Micro (Auto)  - PSA, Diagnostic; Future    2. Microhematuria  Chronic for her workup negative  - POCT Urinalysis Dipstick w/ Micro (Auto)      Orders Placed This Encounter   Procedures    PSA, Diagnostic     In 1 year prior to the next visit     Standing Status:   Future     Standing Expiration Date:   5/23/2021    POCT Urinalysis Dipstick w/ Micro (Auto)        Return in about 1 year (around 5/23/2020) for PSA prior to vext visit. EMR Dragon/transcription disclaimer: Much of this documentt is electronic  transcription/translation of spoken language to printed text. The  electronic translation of spoken language may be erroneous, or at times,  nonsensical words or phrases may be inadvertently transcribed.  Although I  have reviewed the document for such errors, some may still exist.

## 2019-05-24 ENCOUNTER — NURSE TRIAGE (OUTPATIENT)
Dept: CALL CENTER | Facility: HOSPITAL | Age: 54
End: 2019-05-24

## 2019-06-20 ENCOUNTER — LAB (OUTPATIENT)
Dept: LAB | Facility: HOSPITAL | Age: 54
End: 2019-06-20

## 2019-06-20 DIAGNOSIS — C91.10 CHRONIC LYMPHOCYTIC LEUK OF B-CELL TYPE NOT ACHIEVE REMIS (HCC): ICD-10-CM

## 2019-06-20 LAB
ALBUMIN SERPL-MCNC: 4.7 G/DL (ref 3.5–5)
ALBUMIN/GLOB SERPL: 1.7 G/DL (ref 1.1–2.5)
ALP SERPL-CCNC: 101 U/L (ref 24–120)
ALT SERPL W P-5'-P-CCNC: 94 U/L (ref 0–54)
ANION GAP SERPL CALCULATED.3IONS-SCNC: 10 MMOL/L (ref 4–13)
AST SERPL-CCNC: 123 U/L (ref 7–45)
BILIRUB SERPL-MCNC: 2.2 MG/DL (ref 0.1–1)
BUN BLD-MCNC: 24 MG/DL (ref 5–21)
BUN/CREAT SERPL: 28.6 (ref 7–25)
CALCIUM SPEC-SCNC: 9.7 MG/DL (ref 8.4–10.4)
CHLORIDE SERPL-SCNC: 105 MMOL/L (ref 98–110)
CO2 SERPL-SCNC: 28 MMOL/L (ref 24–31)
CREAT BLD-MCNC: 0.84 MG/DL (ref 0.5–1.4)
DEPRECATED RDW RBC AUTO: 43.1 FL (ref 40–54)
ERYTHROCYTE [DISTWIDTH] IN BLOOD BY AUTOMATED COUNT: 13.2 % (ref 12–15)
GFR SERPL CREATININE-BSD FRML MDRD: 95 ML/MIN/1.73
GLOBULIN UR ELPH-MCNC: 2.8 GM/DL
GLUCOSE BLD-MCNC: 109 MG/DL (ref 70–100)
HCT VFR BLD AUTO: 44 % (ref 40–52)
HGB BLD-MCNC: 15.7 G/DL (ref 14–18)
HOLD SPECIMEN: NORMAL
LYMPHOCYTES # BLD MANUAL: 28.08 10*3/MM3 (ref 0.72–4.86)
LYMPHOCYTES NFR BLD MANUAL: 2 % (ref 4–12)
LYMPHOCYTES NFR BLD MANUAL: 64.6 % (ref 15–45)
MCH RBC QN AUTO: 31.9 PG (ref 28–32)
MCHC RBC AUTO-ENTMCNC: 35.7 G/DL (ref 33–36)
MCV RBC AUTO: 89.4 FL (ref 82–95)
MONOCYTES # BLD AUTO: 0.87 10*3/MM3 (ref 0.19–1.3)
NEUTROPHILS # BLD AUTO: 6.13 10*3/MM3 (ref 1.87–8.4)
NEUTROPHILS NFR BLD MANUAL: 14.1 % (ref 39–78)
PLAT MORPH BLD: NORMAL
PLATELET # BLD AUTO: 236 10*3/MM3 (ref 130–400)
PMV BLD AUTO: 9.7 FL (ref 6–12)
POTASSIUM BLD-SCNC: 4.3 MMOL/L (ref 3.5–5.3)
PROT SERPL-MCNC: 7.5 G/DL (ref 6.3–8.7)
RBC # BLD AUTO: 4.92 10*6/MM3 (ref 4.8–5.9)
RBC MORPH BLD: NORMAL
SODIUM BLD-SCNC: 143 MMOL/L (ref 135–145)
VARIANT LYMPHS NFR BLD MANUAL: 19.2 % (ref 0–5)
WBC MORPH BLD: NORMAL
WBC NRBC COR # BLD: 43.47 10*3/MM3 (ref 4.8–10.8)

## 2019-06-20 PROCEDURE — 85007 BL SMEAR W/DIFF WBC COUNT: CPT

## 2019-06-20 PROCEDURE — 36415 COLL VENOUS BLD VENIPUNCTURE: CPT | Performed by: INTERNAL MEDICINE

## 2019-06-20 PROCEDURE — 80053 COMPREHEN METABOLIC PANEL: CPT | Performed by: INTERNAL MEDICINE

## 2019-06-20 PROCEDURE — 85025 COMPLETE CBC W/AUTO DIFF WBC: CPT

## 2019-07-11 NOTE — PROGRESS NOTES
Rico Hernandez MD     Chief Complaint   Patient presents with   • Follow-up     cervical lymphadenopathy        History of Present Illness  Rico Enrique is a  53 y.o. male who is here for follow up. He has had a recent flair up of epistaxis. This was worse in the winter and better this summer The symptoms are localized to the right side.          Review of Systems  Reviewed per patient intake note    Past History:  Past medical and surgical history, family history and social history reviewed and updated when appropriate.  Current medications and allergies reviewed and updated when appropriate.  Allergies:  Patient has no known allergies.        Vital Signs:   Temp:  [97.3 °F (36.3 °C)] 97.3 °F (36.3 °C)  BP: (110)/(80) 110/80    Physical Exam:  CONSTITUTIONAL: well nourished, well-developed, alert, oriented, in no acute distress   COMMUNICATION AND VOICE: able to communicate normally, normal voice quality  HEAD: normocephalic, no lesions, atraumatic, no tenderness, no masses   FACE: appearance normal, no lesions, no tenderness, no deformities, facial motion symmetric  EYES: ocular motility normal, eyelids normal, orbits normal, no proptosis, conjunctiva normal , pupils equal, round  HEARING: response to conversational voice normal bilaterally   EXTERNAL EARS: auricles without lesions  EXTERNAL NOSE: structure normal, no tenderness on palpation, no nasal discharge, no lesions, no evidence of trauma, nostrils patent  INTRANASAL EXAM: right dilated vessels on the septum no mass or lesion  LIPS: structure normal, no tenderness on palpation, no lesions, no evidence of trauma  NECK: neck appearance normal, no overt lymphadenopathy   LYMPH NODES: no lymphadenopathy  CHEST/RESPIRATORY: respiratory effort normal  CARDIOVASCULAR: extremities without cyanosis or edema, no overt jugulovenous distension present  NEUROLOGIC/PSYCHIATRIC: oriented appropriately for age, mood normal, affect appropriate, cranial nerves intact  grossly unless specifically mentioned above          Assessment   1. Chronic rhinitis        Plan    Conservative management.          Return if symptoms worsen or fail to improve.    Rico Hernandez MD  07/12/19  9:35 AM

## 2019-07-12 ENCOUNTER — OFFICE VISIT (OUTPATIENT)
Dept: OTOLARYNGOLOGY | Facility: CLINIC | Age: 54
End: 2019-07-12

## 2019-07-12 VITALS
WEIGHT: 220 LBS | HEIGHT: 72 IN | BODY MASS INDEX: 29.8 KG/M2 | DIASTOLIC BLOOD PRESSURE: 80 MMHG | SYSTOLIC BLOOD PRESSURE: 110 MMHG | TEMPERATURE: 97.3 F

## 2019-07-12 DIAGNOSIS — J31.0 CHRONIC RHINITIS: Primary | ICD-10-CM

## 2019-07-12 PROCEDURE — 99213 OFFICE O/P EST LOW 20 MIN: CPT | Performed by: OTOLARYNGOLOGY

## 2019-07-12 NOTE — PATIENT INSTRUCTIONS
Saline nasal spray or saline gel to nose three times a day and as needed. Use a bedside humidifier at night. Place Vasoline in nose in the morning and at night.  Place Vasoline in nose in the morning and at night on the center part of the nose (septum) at least 15 minutes prior to the nasal spray. When using the nasal spray, aim towards the outer corner of the eye.

## 2019-07-12 NOTE — PROGRESS NOTES
Mag Faith CMA   Patient Intake Note    Review of Systems  Review of Systems   Constitutional: Positive for fatigue. Negative for chills and fever.   HENT:        SEE HPI   Respiratory: Negative for cough and shortness of breath.    Cardiovascular: Negative for chest pain.   Gastrointestinal: Negative for diarrhea and nausea.   Musculoskeletal: Negative for neck pain.   Neurological: Negative for dizziness.       QUALITY MEASURES    Tobacco Use: Screening and Cessation Intervention  Social History    Tobacco Use      Smoking status: Never Smoker      Smokeless tobacco: Never Used        Mag Faith CMA  7/12/2019  9:20 AM     Telephone Encounter by Erendira Garcia, RN, BSN at 06/16/17 10:29 AM     Author:  Erendira Garcia RN, BSN Service:  (none) Author Type:  Registered Nurse     Filed:  06/16/17 10:32 AM Encounter Date:  6/16/2017 Status:  Signed     :  Erendira Garcia, RN, BSN (Registered Nurse)            Late entry- Call was transferred to triage.  Pt had[CD1.1M] Right Lymph Node Biopsy[CD1.1C] yesterday     Pt doing well with no issues or problems.  Mother would like to know the following:  - When can dressing come off?  -When can pt shower?  - When can pt start to put ointment on incision?   Mother does not recall conversation with MD previously.     Fwd to Dr. Antunez- pls advise[CD1.1M]       Revision History        User Key Date/Time User Provider Type Action    > CD1.1 06/16/17 10:32 AM Erendira Garcia, RN, BSN Registered Nurse Sign    C - Copied, M - Manual

## 2019-08-27 DIAGNOSIS — C91.10 CLL (CHRONIC LYMPHOCYTIC LEUKEMIA) (HCC): Primary | ICD-10-CM

## 2019-09-26 DIAGNOSIS — D50.8 OTHER IRON DEFICIENCY ANEMIA: Primary | ICD-10-CM

## 2019-10-03 ENCOUNTER — LAB (OUTPATIENT)
Dept: LAB | Facility: HOSPITAL | Age: 54
End: 2019-10-03

## 2019-10-03 DIAGNOSIS — D50.8 OTHER IRON DEFICIENCY ANEMIA: ICD-10-CM

## 2019-10-03 LAB
ALBUMIN SERPL-MCNC: 4.4 G/DL (ref 3.5–5.2)
ALBUMIN/GLOB SERPL: 1.8 G/DL
ALP SERPL-CCNC: 106 U/L (ref 39–117)
ALT SERPL W P-5'-P-CCNC: 71 U/L (ref 1–41)
ANION GAP SERPL CALCULATED.3IONS-SCNC: 13 MMOL/L (ref 5–15)
AST SERPL-CCNC: 74 U/L (ref 1–40)
BILIRUB SERPL-MCNC: 1.7 MG/DL (ref 0.2–1.2)
BUN BLD-MCNC: 21 MG/DL (ref 6–20)
BUN/CREAT SERPL: 25.3 (ref 7–25)
CALCIUM SPEC-SCNC: 9.2 MG/DL (ref 8.6–10.5)
CHLORIDE SERPL-SCNC: 99 MMOL/L (ref 98–107)
CO2 SERPL-SCNC: 28 MMOL/L (ref 22–29)
CREAT BLD-MCNC: 0.83 MG/DL (ref 0.76–1.27)
DEPRECATED RDW RBC AUTO: 44.5 FL (ref 37–54)
ERYTHROCYTE [DISTWIDTH] IN BLOOD BY AUTOMATED COUNT: 13.2 % (ref 12.3–15.4)
GFR SERPL CREATININE-BSD FRML MDRD: 96 ML/MIN/1.73
GLOBULIN UR ELPH-MCNC: 2.4 GM/DL
GLUCOSE BLD-MCNC: 121 MG/DL (ref 65–99)
HCT VFR BLD AUTO: 43 % (ref 37.5–51)
HGB BLD-MCNC: 14.8 G/DL (ref 13–17.7)
LYMPHOCYTES # BLD MANUAL: 39.12 10*3/MM3 (ref 0.7–3.1)
LYMPHOCYTES NFR BLD MANUAL: 1 % (ref 5–12)
LYMPHOCYTES NFR BLD MANUAL: 95 % (ref 19.6–45.3)
MCH RBC QN AUTO: 32 PG (ref 26.6–33)
MCHC RBC AUTO-ENTMCNC: 34.4 G/DL (ref 31.5–35.7)
MCV RBC AUTO: 93.1 FL (ref 79–97)
MONOCYTES # BLD AUTO: 0.41 10*3/MM3 (ref 0.1–0.9)
NEUTROPHILS # BLD AUTO: 1.65 10*3/MM3 (ref 1.7–7)
NEUTROPHILS NFR BLD MANUAL: 4 % (ref 42.7–76)
PLAT MORPH BLD: NORMAL
PLATELET # BLD AUTO: 221 10*3/MM3 (ref 140–450)
PMV BLD AUTO: 9.7 FL (ref 6–12)
POIKILOCYTOSIS BLD QL SMEAR: ABNORMAL
POTASSIUM BLD-SCNC: 3.5 MMOL/L (ref 3.5–5.2)
PROT SERPL-MCNC: 6.8 G/DL (ref 6–8.5)
RBC # BLD AUTO: 4.62 10*6/MM3 (ref 4.14–5.8)
SMUDGE CELLS BLD QL SMEAR: ABNORMAL
SODIUM BLD-SCNC: 140 MMOL/L (ref 136–145)
WBC NRBC COR # BLD: 41.18 10*3/MM3 (ref 3.4–10.8)

## 2019-10-03 PROCEDURE — 85007 BL SMEAR W/DIFF WBC COUNT: CPT | Performed by: INTERNAL MEDICINE

## 2019-10-03 PROCEDURE — 80053 COMPREHEN METABOLIC PANEL: CPT | Performed by: INTERNAL MEDICINE

## 2019-10-03 PROCEDURE — 36415 COLL VENOUS BLD VENIPUNCTURE: CPT

## 2019-10-03 PROCEDURE — 85025 COMPLETE CBC W/AUTO DIFF WBC: CPT | Performed by: INTERNAL MEDICINE

## 2019-12-05 ENCOUNTER — TELEPHONE (OUTPATIENT)
Dept: ONCOLOGY | Facility: CLINIC | Age: 54
End: 2019-12-05

## 2019-12-05 ENCOUNTER — LAB (OUTPATIENT)
Dept: LAB | Facility: HOSPITAL | Age: 54
End: 2019-12-05

## 2019-12-05 DIAGNOSIS — C91.10 CLL (CHRONIC LYMPHOCYTIC LEUKEMIA) (HCC): ICD-10-CM

## 2019-12-05 LAB
ALBUMIN SERPL-MCNC: 4.5 G/DL (ref 3.5–5.2)
ALBUMIN/GLOB SERPL: 1.9 G/DL
ALP SERPL-CCNC: 96 U/L (ref 39–117)
ALT SERPL W P-5'-P-CCNC: 99 U/L (ref 1–41)
ANION GAP SERPL CALCULATED.3IONS-SCNC: 9 MMOL/L (ref 5–15)
AST SERPL-CCNC: 125 U/L (ref 1–40)
BILIRUB SERPL-MCNC: 1.6 MG/DL (ref 0.2–1.2)
BUN BLD-MCNC: 23 MG/DL (ref 6–20)
BUN/CREAT SERPL: 28.4 (ref 7–25)
CALCIUM SPEC-SCNC: 9.7 MG/DL (ref 8.6–10.5)
CHLORIDE SERPL-SCNC: 103 MMOL/L (ref 98–107)
CO2 SERPL-SCNC: 29 MMOL/L (ref 22–29)
CREAT BLD-MCNC: 0.81 MG/DL (ref 0.76–1.27)
DEPRECATED RDW RBC AUTO: 46.5 FL (ref 37–54)
ERYTHROCYTE [DISTWIDTH] IN BLOOD BY AUTOMATED COUNT: 13.7 % (ref 12.3–15.4)
GFR SERPL CREATININE-BSD FRML MDRD: 99 ML/MIN/1.73
GLOBULIN UR ELPH-MCNC: 2.4 GM/DL
GLUCOSE BLD-MCNC: 119 MG/DL (ref 65–99)
HCT VFR BLD AUTO: 42.6 % (ref 37.5–51)
HGB BLD-MCNC: 14.6 G/DL (ref 13–17.7)
LYMPHOCYTES # BLD MANUAL: 33.26 10*3/MM3 (ref 0.7–3.1)
LYMPHOCYTES NFR BLD MANUAL: 80.4 % (ref 19.6–45.3)
MCH RBC QN AUTO: 32 PG (ref 26.6–33)
MCHC RBC AUTO-ENTMCNC: 34.3 G/DL (ref 31.5–35.7)
MCV RBC AUTO: 93.4 FL (ref 79–97)
NEUTROPHILS # BLD AUTO: 7.69 10*3/MM3 (ref 1.7–7)
NEUTROPHILS NFR BLD MANUAL: 18.6 % (ref 42.7–76)
PLAT MORPH BLD: NORMAL
PLATELET # BLD AUTO: 280 10*3/MM3 (ref 140–450)
PMV BLD AUTO: 9.2 FL (ref 6–12)
POTASSIUM BLD-SCNC: 4 MMOL/L (ref 3.5–5.2)
PROT SERPL-MCNC: 6.9 G/DL (ref 6–8.5)
RBC # BLD AUTO: 4.56 10*6/MM3 (ref 4.14–5.8)
RBC MORPH BLD: NORMAL
SMUDGE CELLS BLD QL SMEAR: ABNORMAL
SODIUM BLD-SCNC: 141 MMOL/L (ref 136–145)
VARIANT LYMPHS NFR BLD MANUAL: 1 % (ref 0–5)
WBC NRBC COR # BLD: 41.37 10*3/MM3 (ref 3.4–10.8)

## 2019-12-05 PROCEDURE — 85007 BL SMEAR W/DIFF WBC COUNT: CPT

## 2019-12-05 PROCEDURE — 80053 COMPREHEN METABOLIC PANEL: CPT

## 2019-12-05 PROCEDURE — 85025 COMPLETE CBC W/AUTO DIFF WBC: CPT

## 2019-12-05 PROCEDURE — 36415 COLL VENOUS BLD VENIPUNCTURE: CPT

## 2019-12-05 NOTE — PROGRESS NOTES
[facility] ON[facility] GROUP HEMATOLOGY AND ONCOLOGY  [address]  [facility] [address] [phone]    Patient Name: Rico Enrique  Encounter Date: 12/12/2019  YOB: 1965  Patient Number: [id number]      REASON FOR FOLLOW-UP: Rico Enrique is a pleasant 54-year-old  male who is seen on followup for chronic lymphocytic leukemia/small lymphocytic lymphoma (CLL/SLL) monoclonal kappa, Stage 0.  He is off therapy.  Dr. Hernandez called 02/06/2019, likely reactive neck nodes. Note of Dr. Hernandez 03/27/2019.  1.5 cm right level II node and patient decided for observation.   The patient is here alone. History is obtained from the patient who is considered to be a marginal historian.        Problem List Items Addressed This Visit     None        Oncology/Hematology History    DIAGNOSTIC ABNORMALITIES:  CBC from [facility] 02/05/2013 revealed a WBC of 15.03, hemoglobin 15.6, hematocrit 43.3, MCV 88.4, platelet count 300,000. Absolute lymphocyte count was elevated at 9.7. ANC was 4.7.  PATHOLOGY:  [facility], Fluorescence in-situ Hybridization (FISH ) report, 04/04/2013. 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 Deletion 13q is the most common genetic abnormality in CLL, found in 36-64% of cases. As the sole abnormality, it has a good prognostic value. Both the disease free interval and overall survival are better than in cases with a normal karyotype because of slow disease progression. Monitoring for deletion 13q may be useful in assessing the patient's remission/relapse status. No gene rearrangements characteristic for t(11;14) or abnormalities involving the target regions of chromosomes 6, 11, 12, or 17 were detected. However, it should be noted that the FISH probes utilized in this analysis  cannot entirely exclude the presence of other chromosomal abnormalities. Correlation with classic cytogenetics, clinical, flow cytometric, and morphological data is recommended, if available. 04/10/2013 JF  Peripheral blood, 04/03/2013 ([facility]).  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  Comments:  Flow cytometry identified an abnormal low kappa light chain-restricted B cell population showing coexpression of CD20, CD5, and CD23 without significant expression of CD10, FMC7 or CD79b.  The abnormal B cells are of a predominantly small size and account for approximately 96.2% of the B cells and 37.0% of the total white blood cells.  Roberson-Giemsa stained cytospin preparation shows atypical lymphocytes of a predominantly small to intermediate size.  These findings are indicative of involvement by a mature small B cell neoplasm. The cytomorphologic features and immunophenotype of the neoplastic B cells are consistent with CLL/SLL.  A 1.9% subset of the CD5-positive B cells coexpresses CD38. Of note, in the majority of patients, expression of CD38 on less than 30% of CD5-positive CLL/SLL B cells has been associated with more favorable prognosis. FISH studies for CLL associated genetic abnormalities and IgVH hypermutation analysis by PCR are underway and will be reported separately. Correlation with morphological findings and close clinical followup are recommended.   Abdominal ultrasound done at [facility] on 04/12/2013. IMPRESSION:  1. Mobile gallstone within the lumen of the gallbladder. No gallbladder wall thickening or pericholecystic fluid. No biliary dilatation. Findings suggest cholelithiasis. 2. Echogenic appearance to the liver parenchyma may represent diffuse fatty infiltration. 3. Normal appearance to the kidneys, spleen, and visible portions of the pancreas.   CT of the abdomen and pelvis done on 03/30/2015 at [facility]. Impression: No CT evidence of neoplastic disease. No acute intra-abdominal/pelvic pathological process. Cholelithiasis. Colonic diverticulosis without CT evidence of acute diverticulitis. Bilateral spondylolysis at L5. Mild spondylolisthesis not excluded.   Ultrasound of the abdomen done on 03/30/2015 at [facility]. Impression: Cholelithiasis. Steatosis of the liver.       PREVIOUS INTERVENTIONS:  Observation.        CLL (chronic lymphocytic leukemia) (CMS/HCC)    12/5/2019 Initial Diagnosis     CLL (chronic lymphocytic leukemia) (CMS/HCC)         PAST MEDICAL HISTORY:  ALLERGIES:  No Known Allergies  CURRENT MEDICATIONS:  Outpatient Encounter Medications as of 12/12/2019   Medication Sig Dispense Refill   • Glucosamine-Chondroitin (GLUCOSAMINE CHONDR COMPLEX PO) Take  by mouth Daily.     • lovastatin (MEVACOR) 20 MG tablet Take 40 mg by mouth Every Night.     • NIACIN FLUSH FREE 500 MG capsule TK 1 C PO QD  5   • OMEGA-3 FATTY ACIDS PO Take  by mouth.     • valsartan-hydrochlorothiazide (DIOVAN-HCT) 160-25 MG per tablet TK 1 T PO QD       No facility-administered encounter medications on file as of 12/12/2019.      ADULT ILLNESSES:  Patient Active Problem List   Diagnosis Code   • Family hx colonic polyps Z83.71   • Family hx of colon cancer Z80.0   • Hx of colonic polyp Z86.010   • Cervical lymphadenopathy R59.0   • CLL (chronic lymphocytic leukemia) (CMS/HCC) C91.10     SURGERIES:  Past Surgical History:   Procedure Laterality Date   • COLONOSCOPY  02/15/2013   • COLONOSCOPY N/A 3/19/2018    Procedure: COLONOSCOPY WITH ANESTHESIA;  Surgeon: Leonidas Ibanez MD;  Location: [facility] ENDOSCOPY;  Service: Gastroenterology   • EYE SURGERY     • TONSILLECTOMY AND ADENOIDECTOMY       HEALTH MAINTENANCE ITEMS:  Health Maintenance Due   Topic Date Due   • ANNUAL PHYSICAL  08/16/1966   • TDAP/TD VACCINES (1 - Tdap) 08/16/1974   • ZOSTER VACCINE (1 of 2) 08/16/2013   • HEPATITIS C SCREENING   "11/16/2017       <no information>  Last Completed Colonoscopy       Status Date      COLONOSCOPY Done 3/19/2018 Surg:COLONOSCOPY     Patient has more history with this topic...          There is no immunization history on file for this patient.  Last Completed Mammogram     Patient has no health maintenance due at this time            FAMILY HISTORY:  Family History   Problem Relation Age of Onset   • Colon cancer Maternal Aunt    • Colon polyps Maternal Uncle    • Colon cancer Maternal Aunt    • Colon cancer Paternal Grandmother      SOCIAL HISTORY:  Social History     Socioeconomic History   • Marital status:      Spouse name: Not on file   • Number of children: Not on file   • Years of education: Not on file   • Highest education level: Not on file   Tobacco Use   • Smoking status: Never Smoker   • Smokeless tobacco: Never Used   Substance and Sexual Activity   • Alcohol use: No     Comment: rare   • Drug use: No   • Sexual activity: Defer       REVIEW OF SYSTEMS:    Review of Systems   Constitutional: Negative for activity change, chills, diaphoresis, fatigue, fever and unexpected weight loss.        \"I feel okay.\"   HENT: Negative for congestion.    Respiratory: Negative for cough, shortness of breath and wheezing.    Cardiovascular: Negative for chest pain, palpitations and leg swelling.   Gastrointestinal: Negative for abdominal distention, abdominal pain, blood in stool, constipation, diarrhea, nausea and vomiting.   Endocrine: Negative for cold intolerance and heat intolerance.   Genitourinary: Negative for difficulty urinating, dysuria, frequency, hematuria, urgency and urinary incontinence.   Musculoskeletal: Negative for arthralgias, joint swelling, myalgias and neck stiffness.   Skin: Negative for pallor.   Allergic/Immunologic: Negative for food allergies.   Neurological: Negative for dizziness, tremors, seizures, syncope, speech difficulty, weakness, headache and confusion.   Hematological: " "Negative for adenopathy. Does not bruise/bleed easily.   Psychiatric/Behavioral: Negative for agitation, behavioral problems, dysphoric mood, hallucinations and depressed mood.       VITAL SIGNS: /74   Pulse 88   Temp 96.5 °F (35.8 °C)   Resp 18   Ht 182.9 cm (72.01\")   Wt 98.3 kg (216 lb 11.2 oz)   SpO2 95%   BMI 29.38 kg/m²  Body surface area is 2.2 meters squared.   Pain Score    12/12/19 0813   PainSc: 0-No pain       PHYSICAL EXAMINATION:     Physical Exam   Constitutional: He is oriented to person, place, and time. He appears well-developed and well-nourished. No distress.   HENT:   Head: Normocephalic and atraumatic.   Eyes: Right eye exhibits no discharge.   Neck: Trachea normal.   Cardiovascular: Normal rate, regular rhythm and normal pulses.   Pulmonary/Chest: Effort normal and breath sounds normal. He has no wheezes. He has no rhonchi. He has no rales. Chest wall is not dull to percussion.   Abdominal: Soft. Normal appearance and bowel sounds are normal. He exhibits no distension. There is no tenderness. There is no rebound and no guarding.   Musculoskeletal: He exhibits no edema.   Lymphadenopathy:     He has no axillary adenopathy.        Right: No inguinal and no supraclavicular adenopathy present.        Left: No inguinal and no supraclavicular adenopathy present.   Neurological: He is alert and oriented to person, place, and time. He has normal strength. No sensory deficit.   Skin: Skin is warm and dry. He is not diaphoretic. No pallor.   Psychiatric: He has a normal mood and affect. His behavior is normal. Judgment and thought content normal.   Vitals reviewed.      LABS    Lab Results - Last 18 Months   Lab Units 12/05/19  0733 10/03/19  0730 06/20/19  0840 03/21/19  0910   HEMOGLOBIN g/dL 14.6 14.8 15.7 14.7   HEMATOCRIT % 42.6 43.0 44.0 42.7   MCV fL 93.4 93.1 89.4 90.5   WBC 10*3/mm3 41.37* 41.18* 43.47* 39.31*   RDW % 13.7 13.2 13.2 13.2   MPV fL 9.2 9.7 9.7 9.6   PLATELETS " 10*3/mm3 280 221 236 251   NEUTROS ABS 10*3/mm3 7.69* 1.65* 6.13 3.93   NRBC /100 WBC  --   --   --  0.0   NEUTROPHIL % % 18.6* 4.0* 14.1* 10.0*   MONOCYTES % %  --  1.0* 2.0*  --    ATYP LYMPH % % 1.0  --  19.2*  --        Lab Results - Last 18 Months   Lab Units 12/05/19  0733 10/03/19  0730 06/20/19  0840   GLUCOSE mg/dL 119* 121* 109*   SODIUM mmol/L 141 140 143   POTASSIUM mmol/L 4.0 3.5 4.3   CO2 mmol/L 29.0 28.0 28.0   CHLORIDE mmol/L 103 99 105   ANION GAP mmol/L 9.0 13.0 10.0   CREATININE mg/dL 0.81 0.83 0.84   BUN mg/dL 23* 21* 24*   BUN / CREAT RATIO  28.4* 25.3* 28.6*   CALCIUM mg/dL 9.7 9.2 9.7   EGFR IF NONAFRICN AM mL/min/1.73 99 96 95   ALK PHOS U/L 96 106 101   TOTAL PROTEIN g/dL 6.9 6.8 7.5   ALT (SGPT) U/L 99* 71* 94*   AST (SGOT) U/L 125* 74* 123*   BILIRUBIN mg/dL 1.6* 1.7* 2.2*   ALBUMIN g/dL 4.50 4.40 4.70   GLOBULIN gm/dL 2.4 2.4 2.8       No results for input(s): MSPIKE, KAPPALAMB, IGLFLC, URICACID, FREEKAPPAL, CEA, LDH, REFLABREPO in the last 00207 hours.    No results for input(s): IRON, TIBC, LABIRON, FERRITIN, I3KOIST, TSH, FOLATE in the last 48972 hours.    Invalid input(s): VITB12      Rico Enrique reports a pain score of 0.     Patient's Body mass index is 29.38 kg/m². BMI is within normal parameters. No follow-up required..      ASSESSMENT:  1.    Chronic lymphocytic leukemia/small lymphocytic lymphoma (CLL/SLL) monoclonal kappa.  Stage 0.  Asymptomatic.  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  Complications: None.  Treatment status: Observation.  Prognosis: Good.  2.    Hypertension.  3.    Elevated liver function tests from statin, cholelithiasis, and fatty liver.  4.    Diverticulosis.  5.    Hypercholesterolemia.  6.    Basal cell cancer forehead, excised.  7.    Genital herpes.  8.    Cyst, left kidney.   9.    Cholelithiasis.  10.  Elevated PSA.      PLAN:  1.     Re:  Overall status.   2.     Re:  Heme status, white count 41.37, ANC 7.6, lymphocytosis  33.2 from 28.08, hemoglobin 14.6 gm, MCV 93.4 and platelet 220.    No rapid doubling.  No cytopenias.  3.     Re:  Pre-office CMP.   Bilirubin at 1.6 from 1.7 from 2.2.   from 74 from 123, and ALT 99 from 71 from 94.   Positive for gallstones.   4.     Re:  Interval CBC 10/03/2019.  White count 41.1, hemoglobin 14.8 gm, and platelet 221.     5.     Re:  No indications for CLL therapy.  No rapid doubling of lymphocytes, fevers, recurrent infections, or symptomatic adenopathies.  6.     Re:  Stable for observation.    7.    CBC with differential every 3 months.  8.    Continue current medicines.  9.    Plan of care discussed with patient.  Understanding expressed.  Patient agreeable to proceed.  10.  Continue ongoing management per primary care physician and other specialists.  11.  Return to office in 6 months with pre-office CMP and CBC and differential at the [place] office.      TIME SPENT:  Face-to-face time on this encounter, as defined by the American Medical Association in the 2018 Current Procedural Terminology codebook; assessment, record review, lab review, planning and education is 20 minutes.      cc:   Konstantin Fleming MD          (Maggie Thompson MD)          Fredy Waldrop MD          (Yang Hernandez MD)          (Leonidas Ibanez MD)

## 2019-12-12 ENCOUNTER — OFFICE VISIT (OUTPATIENT)
Dept: ONCOLOGY | Facility: CLINIC | Age: 54
End: 2019-12-12

## 2019-12-12 VITALS
OXYGEN SATURATION: 95 % | HEART RATE: 88 BPM | WEIGHT: 216.7 LBS | TEMPERATURE: 96.5 F | HEIGHT: 72 IN | SYSTOLIC BLOOD PRESSURE: 132 MMHG | BODY MASS INDEX: 29.35 KG/M2 | DIASTOLIC BLOOD PRESSURE: 74 MMHG | RESPIRATION RATE: 18 BRPM

## 2019-12-12 DIAGNOSIS — C91.10 CLL (CHRONIC LYMPHOCYTIC LEUKEMIA) (HCC): Primary | ICD-10-CM

## 2019-12-12 PROCEDURE — 99213 OFFICE O/P EST LOW 20 MIN: CPT | Performed by: INTERNAL MEDICINE

## 2020-03-12 ENCOUNTER — LAB (OUTPATIENT)
Dept: LAB | Facility: HOSPITAL | Age: 55
End: 2020-03-12

## 2020-03-12 DIAGNOSIS — C91.10 CLL (CHRONIC LYMPHOCYTIC LEUKEMIA) (HCC): ICD-10-CM

## 2020-03-12 LAB
DEPRECATED RDW RBC AUTO: 44.1 FL (ref 37–54)
EOSINOPHIL # BLD MANUAL: 0.45 10*3/MM3 (ref 0–0.4)
EOSINOPHIL NFR BLD MANUAL: 1.1 % (ref 0.3–6.2)
ERYTHROCYTE [DISTWIDTH] IN BLOOD BY AUTOMATED COUNT: 13.1 % (ref 12.3–15.4)
HCT VFR BLD AUTO: 44.3 % (ref 37.5–51)
HGB BLD-MCNC: 15.3 G/DL (ref 13–17.7)
LYMPHOCYTES # BLD MANUAL: 33.79 10*3/MM3 (ref 0.7–3.1)
LYMPHOCYTES NFR BLD MANUAL: 82.8 % (ref 19.6–45.3)
MCH RBC QN AUTO: 31.8 PG (ref 26.6–33)
MCHC RBC AUTO-ENTMCNC: 34.5 G/DL (ref 31.5–35.7)
MCV RBC AUTO: 92.1 FL (ref 79–97)
NEUTROPHILS # BLD AUTO: 5.26 10*3/MM3 (ref 1.7–7)
NEUTROPHILS NFR BLD MANUAL: 12.9 % (ref 42.7–76)
PLAT MORPH BLD: NORMAL
PLATELET # BLD AUTO: 230 10*3/MM3 (ref 140–450)
PMV BLD AUTO: 9.2 FL (ref 6–12)
RBC # BLD AUTO: 4.81 10*6/MM3 (ref 4.14–5.8)
RBC MORPH BLD: NORMAL
SMUDGE CELLS BLD QL SMEAR: ABNORMAL
VARIANT LYMPHS NFR BLD MANUAL: 3.2 % (ref 0–5)
WBC NRBC COR # BLD: 40.81 10*3/MM3 (ref 3.4–10.8)

## 2020-03-12 PROCEDURE — 85007 BL SMEAR W/DIFF WBC COUNT: CPT

## 2020-03-12 PROCEDURE — 85025 COMPLETE CBC W/AUTO DIFF WBC: CPT

## 2020-03-12 PROCEDURE — 36415 COLL VENOUS BLD VENIPUNCTURE: CPT

## 2020-05-01 ENCOUNTER — TELEPHONE (OUTPATIENT)
Dept: ONCOLOGY | Facility: CLINIC | Age: 55
End: 2020-05-01

## 2020-06-18 ENCOUNTER — LAB (OUTPATIENT)
Dept: LAB | Facility: HOSPITAL | Age: 55
End: 2020-06-18

## 2020-06-18 ENCOUNTER — TELEPHONE (OUTPATIENT)
Dept: ONCOLOGY | Facility: CLINIC | Age: 55
End: 2020-06-18

## 2020-06-18 DIAGNOSIS — C91.10 CLL (CHRONIC LYMPHOCYTIC LEUKEMIA) (HCC): ICD-10-CM

## 2020-06-18 LAB
ALBUMIN SERPL-MCNC: 4.6 G/DL (ref 3.5–5.2)
ALBUMIN/GLOB SERPL: 2.2 G/DL
ALP SERPL-CCNC: 123 U/L (ref 39–117)
ALT SERPL W P-5'-P-CCNC: 91 U/L (ref 1–41)
ANION GAP SERPL CALCULATED.3IONS-SCNC: 14 MMOL/L (ref 5–15)
AST SERPL-CCNC: 105 U/L (ref 1–40)
BILIRUB SERPL-MCNC: 1.7 MG/DL (ref 0.2–1.2)
BUN BLD-MCNC: 26 MG/DL (ref 6–20)
BUN/CREAT SERPL: 33.3 (ref 7–25)
CALCIUM SPEC-SCNC: 9.6 MG/DL (ref 8.6–10.5)
CHLORIDE SERPL-SCNC: 101 MMOL/L (ref 98–107)
CO2 SERPL-SCNC: 25 MMOL/L (ref 22–29)
CREAT BLD-MCNC: 0.78 MG/DL (ref 0.76–1.27)
DEPRECATED RDW RBC AUTO: 45.2 FL (ref 37–54)
EOSINOPHIL # BLD MANUAL: 0.43 10*3/MM3 (ref 0–0.4)
EOSINOPHIL NFR BLD MANUAL: 1 % (ref 0.3–6.2)
ERYTHROCYTE [DISTWIDTH] IN BLOOD BY AUTOMATED COUNT: 13.4 % (ref 12.3–15.4)
GFR SERPL CREATININE-BSD FRML MDRD: 103 ML/MIN/1.73
GLOBULIN UR ELPH-MCNC: 2.1 GM/DL
GLUCOSE BLD-MCNC: 125 MG/DL (ref 65–99)
HCT VFR BLD AUTO: 43 % (ref 37.5–51)
HGB BLD-MCNC: 14.7 G/DL (ref 13–17.7)
LYMPHOCYTES # BLD MANUAL: 38 10*3/MM3 (ref 0.7–3.1)
LYMPHOCYTES NFR BLD MANUAL: 2 % (ref 5–12)
LYMPHOCYTES NFR BLD MANUAL: 88 % (ref 19.6–45.3)
MCH RBC QN AUTO: 31.7 PG (ref 26.6–33)
MCHC RBC AUTO-ENTMCNC: 34.2 G/DL (ref 31.5–35.7)
MCV RBC AUTO: 92.7 FL (ref 79–97)
MONOCYTES # BLD AUTO: 0.86 10*3/MM3 (ref 0.1–0.9)
NEUTROPHILS # BLD AUTO: 3.89 10*3/MM3 (ref 1.7–7)
NEUTROPHILS NFR BLD MANUAL: 9 % (ref 42.7–76)
PLATELET # BLD AUTO: 218 10*3/MM3 (ref 140–450)
PMV BLD AUTO: 9.8 FL (ref 6–12)
POTASSIUM BLD-SCNC: 3.7 MMOL/L (ref 3.5–5.2)
PROT SERPL-MCNC: 6.7 G/DL (ref 6–8.5)
RBC # BLD AUTO: 4.64 10*6/MM3 (ref 4.14–5.8)
RBC MORPH BLD: NORMAL
SMALL PLATELETS BLD QL SMEAR: ADEQUATE
SMUDGE CELLS BLD QL SMEAR: ABNORMAL
SODIUM BLD-SCNC: 140 MMOL/L (ref 136–145)
WBC NRBC COR # BLD: 43.18 10*3/MM3 (ref 3.4–10.8)

## 2020-06-18 PROCEDURE — 80053 COMPREHEN METABOLIC PANEL: CPT

## 2020-06-18 PROCEDURE — 36415 COLL VENOUS BLD VENIPUNCTURE: CPT

## 2020-06-18 PROCEDURE — 85025 COMPLETE CBC W/AUTO DIFF WBC: CPT

## 2020-06-18 PROCEDURE — 85007 BL SMEAR W/DIFF WBC COUNT: CPT

## 2020-06-18 NOTE — TELEPHONE ENCOUNTER
CRITICAL LAB:  Received call from Renae @  hematology dept with CRITICAL LAB VALUE:  WBC: 43.18  This information was relayed to Dr San for review

## 2020-06-22 NOTE — PROGRESS NOTES
[facility] GROUP HEMATOLOGY AND ONCOLOGY  [address]  [facility] [address] [phone]    Patient Name: Rico Enrique  Encounter Date: 06/29/2020  YOB: 1965  Patient Number: [id number]      REASON FOR FOLLOW-UP: Rico Enrique is a pleasant 54-year-old  male who is seen on followup for chronic lymphocytic leukemia/small lymphocytic lymphoma (CLL/SLL) monoclonal kappa, Stage 0.  He is off therapy.  The patient is here alone. History is obtained from the patient who is considered to be a marginal historian.      Problem List Items Addressed This Visit     None        Oncology/Hematology History    DIAGNOSTIC ABNORMALITIES:  CBC from [facility] [facility] 02/05/2013 revealed a WBC of 15.03, hemoglobin 15.6, hematocrit 43.3, MCV 88.4, platelet count 300,000. Absolute lymphocyte count was elevated at 9.7. ANC was 4.7.  PATHOLOGY:  [facility], Fluorescence in-situ Hybridization (FISH ) report, 04/04/2013. 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 Deletion 13q is the most common genetic abnormality in CLL, found in 36-64% of cases. As the sole abnormality, it has a good prognostic value. Both the disease free interval and overall survival are better than in cases with a normal karyotype because of slow disease progression. Monitoring for deletion 13q may be useful in assessing the patient's remission/relapse status. No gene rearrangements characteristic for t(11;14) or abnormalities involving the target regions of chromosomes 6, 11, 12, or 17 were detected. However, it should be noted that the FISH probes utilized in this analysis cannot entirely exclude the presence of other chromosomal abnormalities. Correlation with classic cytogenetics, clinical, flow cytometric, and morphological data  is recommended, if available. 04/10/2013 JF  Peripheral blood, 04/03/2013 ([facility]).  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  Comments:  Flow cytometry identified an abnormal low kappa light chain-restricted B cell population showing coexpression of CD20, CD5, and CD23 without significant expression of CD10, FMC7 or CD79b.  The abnormal B cells are of a predominantly small size and account for approximately 96.2% of the B cells and 37.0% of the total white blood cells.  Roberson-Giemsa stained cytospin preparation shows atypical lymphocytes of a predominantly small to intermediate size.  These findings are indicative of involvement by a mature small B cell neoplasm. The cytomorphologic features and immunophenotype of the neoplastic B cells are consistent with CLL/SLL.  A 1.9% subset of the CD5-positive B cells coexpresses CD38. Of note, in the majority of patients, expression of CD38 on less than 30% of CD5-positive CLL/SLL B cells has been associated with more favorable prognosis. FISH studies for CLL associated genetic abnormalities and IgVH hypermutation analysis by PCR are underway and will be reported separately. Correlation with morphological findings and close clinical followup are recommended.   Abdominal ultrasound done at [facility] on 04/12/2013. IMPRESSION:  1. Mobile gallstone within the lumen of the gallbladder. No gallbladder wall thickening or pericholecystic fluid. No biliary dilatation. Findings suggest cholelithiasis. 2. Echogenic appearance to the liver parenchyma may represent diffuse fatty infiltration. 3. Normal appearance to the kidneys, spleen, and visible portions of the pancreas.   CT of the abdomen and pelvis done on 03/30/2015 at [facility]. Impression: No CT evidence of neoplastic disease. No acute intra-abdominal/pelvic pathological process. Cholelithiasis. Colonic diverticulosis  without CT evidence of acute diverticulitis. Bilateral spondylolysis at L5. Mild spondylolisthesis not excluded.   Ultrasound of the abdomen done on 03/30/2015 at [facility]. Impression: Cholelithiasis. Steatosis of the liver.       PREVIOUS INTERVENTIONS:  Observation.        CLL (chronic lymphocytic leukemia) (CMS/HCC)    12/5/2019 Initial Diagnosis     CLL (chronic lymphocytic leukemia) (CMS/HCC)         PAST MEDICAL HISTORY:  ALLERGIES:  No Known Allergies  CURRENT MEDICATIONS:  Outpatient Encounter Medications as of 6/29/2020   Medication Sig Dispense Refill   • Glucosamine-Chondroitin (GLUCOSAMINE CHONDR COMPLEX PO) Take  by mouth Daily.     • lovastatin (MEVACOR) 20 MG tablet Take 40 mg by mouth Every Night.     • NIACIN FLUSH FREE 500 MG capsule TK 1 C PO QD  5   • OMEGA-3 FATTY ACIDS PO Take  by mouth.     • valsartan-hydrochlorothiazide (DIOVAN-HCT) 160-25 MG per tablet TK 1 T PO QD       No facility-administered encounter medications on file as of 6/29/2020.      ADULT ILLNESSES:  Patient Active Problem List   Diagnosis Code   • Family hx colonic polyps Z83.71   • Family hx of colon cancer Z80.0   • Hx of colonic polyp Z86.010   • Cervical lymphadenopathy R59.0   • CLL (chronic lymphocytic leukemia) (CMS/HCC) C91.10     SURGERIES:  Past Surgical History:   Procedure Laterality Date   • COLONOSCOPY  02/15/2013   • COLONOSCOPY N/A 3/19/2018    Procedure: COLONOSCOPY WITH ANESTHESIA;  Surgeon: Leonidas Ibanez MD;  Location: [facility] ENDOSCOPY;  Service: Gastroenterology   • EYE SURGERY     • TONSILLECTOMY AND ADENOIDECTOMY       HEALTH MAINTENANCE ITEMS:  Health Maintenance Due   Topic Date Due   • ANNUAL PHYSICAL  08/16/1966   • TDAP/TD VACCINES (1 - Tdap) 08/16/1974   • ZOSTER VACCINE (1 of 2) 08/16/2013   • HEPATITIS C SCREENING  11/16/2017       <no information>  Last Completed Colonoscopy       Status Date      COLONOSCOPY Done 3/19/2018 COLONOSCOPY     Patient has more history with this  "topic...          There is no immunization history on file for this patient.  Last Completed Mammogram     Patient has no health maintenance due at this time            FAMILY HISTORY:  Family History   Problem Relation Age of Onset   • Colon cancer Maternal Aunt    • Colon polyps Maternal Uncle    • Colon cancer Maternal Aunt    • Colon cancer Paternal Grandmother      SOCIAL HISTORY:  Social History     Socioeconomic History   • Marital status:      Spouse name: Not on file   • Number of children: Not on file   • Years of education: Not on file   • Highest education level: Not on file   Tobacco Use   • Smoking status: Never Smoker   • Smokeless tobacco: Never Used   Substance and Sexual Activity   • Alcohol use: No     Comment: rare   • Drug use: No   • Sexual activity: Defer       REVIEW OF SYSTEMS:    Review of Systems   Constitutional: Negative for chills, diaphoresis, fatigue, fever and unexpected weight loss.        \"I feel okay.\"   Respiratory: Negative for cough, shortness of breath and wheezing.    Cardiovascular: Negative for chest pain and palpitations.   Gastrointestinal: Negative for abdominal pain, blood in stool, constipation, diarrhea, nausea and vomiting.   Endocrine: Negative for cold intolerance and heat intolerance.   Genitourinary: Negative for flank pain and hematuria.   Musculoskeletal: Negative for gait problem and neck stiffness.   Skin: Negative for pallor.   Allergic/Immunologic: Negative for food allergies.   Neurological: Negative for speech difficulty, weakness and confusion.   Hematological: Negative for adenopathy. Does not bruise/bleed easily.   Psychiatric/Behavioral: Negative for agitation and hallucinations. The patient is not nervous/anxious.        VITAL SIGNS: /76   Pulse 76   Temp 98.3 °F (36.8 °C)   Resp 18   Ht 182.9 cm (72.01\")   Wt 102 kg (225 lb 3.2 oz)   SpO2 98%   BMI 30.53 kg/m²  Body surface area is 2.24 meters squared.   Pain Score    06/29/20 " 0806   PainSc: 0-No pain       PHYSICAL EXAMINATION:     Physical Exam   Constitutional: He is oriented to person, place, and time. He appears well-developed and well-nourished. No distress.   HENT:   Head: Normocephalic and atraumatic.   Cardiovascular: Normal rate and regular rhythm.   Pulmonary/Chest: Effort normal and breath sounds normal. He has no wheezes. He has no rales.   Abdominal: Soft. Bowel sounds are normal. There is no tenderness.   Musculoskeletal: He exhibits no edema.   Neurological: He is alert and oriented to person, place, and time.   Skin: Skin is warm and dry. He is not diaphoretic. No pallor.   Psychiatric: He has a normal mood and affect. His behavior is normal. Judgment and thought content normal.   Vitals reviewed.      LABS    Lab Results - Last 18 Months   Lab Units 06/18/20  0701 03/12/20  0707 12/05/19  0733 10/03/19  0730 06/20/19  0840 03/21/19  0910   HEMOGLOBIN g/dL 14.7 15.3 14.6 14.8 15.7 14.7   HEMATOCRIT % 43.0 44.3 42.6 43.0 44.0 42.7   MCV fL 92.7 92.1 93.4 93.1 89.4 90.5   WBC 10*3/mm3 43.18* 40.81* 41.37* 41.18* 43.47* 39.31*   RDW % 13.4 13.1 13.7 13.2 13.2 13.2   MPV fL 9.8 9.2 9.2 9.7 9.7 9.6   PLATELETS 10*3/mm3 218 230 280 221 236 251   NEUTROS ABS 10*3/mm3 3.89 5.26 7.69* 1.65* 6.13 3.93   EOS ABS 10*3/mm3 0.43* 0.45*  --   --   --   --    NRBC /100 WBC  --   --   --   --   --  0.0   NEUTROPHIL % % 9.0* 12.9* 18.6* 4.0* 14.1* 10.0*   MONOCYTES % % 2.0*  --   --  1.0* 2.0*  --    ATYP LYMPH % %  --  3.2 1.0  --  19.2*  --        Lab Results - Last 18 Months   Lab Units 06/18/20  0701 12/05/19  0733 10/03/19  0730 06/20/19  0840   GLUCOSE mg/dL 125* 119* 121* 109*   SODIUM mmol/L 140 141 140 143   POTASSIUM mmol/L 3.7 4.0 3.5 4.3   CO2 mmol/L 25.0 29.0 28.0 28.0   CHLORIDE mmol/L 101 103 99 105   ANION GAP mmol/L 14.0 9.0 13.0 10.0   CREATININE mg/dL 0.78 0.81 0.83 0.84   BUN mg/dL 26* 23* 21* 24*   BUN / CREAT RATIO  33.3* 28.4* 25.3* 28.6*   CALCIUM mg/dL 9.6 9.7 9.2  9.7   EGFR IF NONAFRICN AM mL/min/1.73 103 99 96 95   ALK PHOS U/L 123* 96 106 101   TOTAL PROTEIN g/dL 6.7 6.9 6.8 7.5   ALT (SGPT) U/L 91* 99* 71* 94*   AST (SGOT) U/L 105* 125* 74* 123*   BILIRUBIN mg/dL 1.7* 1.6* 1.7* 2.2*   ALBUMIN g/dL 4.60 4.50 4.40 4.70   GLOBULIN gm/dL 2.1 2.4 2.4 2.8       No results for input(s): MSPIKE, KAPPALAMB, IGLFLC, URICACID, FREEKAPPAL, CEA, LDH, REFLABREPO in the last 68728 hours.    No results for input(s): IRON, TIBC, LABIRON, FERRITIN, [id number], TSH, FOLATE in the last 38584 hours.    Invalid input(s): VITB12      Rico Enrique reports a pain score of 0.        Patient's Body mass index is 30.53 kg/m². BMI is above normal parameters. Recommendations include: referral to primary care.        ASSESSMENT:  1.    Chronic lymphocytic leukemia/small lymphocytic lymphoma (CLL/SLL) monoclonal kappa.  Stage 0.  Asymptomatic.  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  Complications: None.  Treatment status: Observation.  Prognosis: Good.  2.    Hypertension.  3.    Elevated liver function tests from statin, cholelithiasis, and fatty liver.  4.    1.5 cm right level II node, followed by Dr. Hernandez.    5.    Obesity BM 30.5.   6.    Basal cell cancer forehead, excised.  7.    Genital herpes.  8.    Cyst, left kidney.   9.    Cholelithiasis.  10.  Elevated PSA.        PLAN:  1.     Re:   Heme status.  WBC 43.1 from 41.37, lymphocytosis 38 from 33.2, hemoglobin 14.7 from 14.6 gm, MCV 92.7 from  93.4 and platelet 218 from 220.    No rapid doubling.  No cytopenias.  2.     Re:  Pre-office CMP.   Bilirubin at 1.7 from 1.6 from 1.7 from 2.2.   from 125 from 74 from 123, and ALT 91 from 99 from 71 from 94.   Positive for gallstones.   3.     Re:  Interval CBC 03/12/2020.  White count 40.8, hemoglobin 15.3 gm, and platelet 230.     4.     Re:  No indications for CLL therapy.  No rapid doubling of lymphocytes, fevers, recurrent infections, or symptomatic  adenopathies.  5.     Re:  Stable for observation.    6.    CBC with differential every 3 months.  7.    Continue current medicines.  8.    Plan of care discussed with patient.  Understanding expressed.  Patient agreeable to proceed.  9.  Continue ongoing management per primary care physician and other specialists.  10.  Return to office in 6 months with pre-office CMP and CBC and differential.        I spent 19 total minutes, face-to-face, caring for Rico today.  Greater than 50% of this time involved counseling and/or coordination of care as documented within this note regarding the patient's illness(es), pros and cons of various treatment options, instructions and/or risk reduction.        cc:   Konstantin Fleming MD          (Maggie Thompson MD)          (Fredy Waldrop MD)          (Yang Hernandez MD)          (Leonidas Ibanez MD)

## 2020-06-29 ENCOUNTER — OFFICE VISIT (OUTPATIENT)
Dept: ONCOLOGY | Facility: CLINIC | Age: 55
End: 2020-06-29

## 2020-06-29 VITALS
WEIGHT: 225.2 LBS | DIASTOLIC BLOOD PRESSURE: 76 MMHG | HEIGHT: 72 IN | SYSTOLIC BLOOD PRESSURE: 138 MMHG | BODY MASS INDEX: 30.5 KG/M2 | RESPIRATION RATE: 18 BRPM | TEMPERATURE: 98.3 F | HEART RATE: 76 BPM | OXYGEN SATURATION: 98 %

## 2020-06-29 DIAGNOSIS — C91.10 CLL (CHRONIC LYMPHOCYTIC LEUKEMIA) (HCC): Primary | ICD-10-CM

## 2020-06-29 PROCEDURE — 99213 OFFICE O/P EST LOW 20 MIN: CPT | Performed by: INTERNAL MEDICINE

## 2020-09-14 ENCOUNTER — TELEPHONE (OUTPATIENT)
Dept: ONCOLOGY | Facility: CLINIC | Age: 55
End: 2020-09-14

## 2020-09-14 NOTE — TELEPHONE ENCOUNTER
Patient having DOT physical tomorrow.  They require a letter from Dr. San each year stating that it is ok for him to drive.     Patient can pick this up today or tomorrow.  He would need to pick it up right after lunch tomorrow.  Or, it could be emailed to him -  [email or web address]    Please let him know when this is complete and if he will need to pick it up, or if it will be emailed to him.  [phone]

## 2020-10-09 ENCOUNTER — TELEPHONE (OUTPATIENT)
Dept: ONCOLOGY | Facility: CLINIC | Age: 55
End: 2020-10-09

## 2020-10-09 ENCOUNTER — LAB (OUTPATIENT)
Dept: LAB | Facility: HOSPITAL | Age: 55
End: 2020-10-09

## 2020-10-09 DIAGNOSIS — C91.10 CLL (CHRONIC LYMPHOCYTIC LEUKEMIA) (HCC): Primary | ICD-10-CM

## 2020-10-09 LAB
BASOPHILS # BLD AUTO: 0.09 10*3/MM3 (ref 0–0.2)
BASOPHILS NFR BLD AUTO: 0.2 % (ref 0–1.5)
DEPRECATED RDW RBC AUTO: 43.5 FL (ref 37–54)
EOSINOPHIL # BLD AUTO: 0.12 10*3/MM3 (ref 0–0.4)
EOSINOPHIL NFR BLD AUTO: 0.3 % (ref 0.3–6.2)
ERYTHROCYTE [DISTWIDTH] IN BLOOD BY AUTOMATED COUNT: 13.1 % (ref 12.3–15.4)
HCT VFR BLD AUTO: 42.5 % (ref 37.5–51)
HGB BLD-MCNC: 14.8 G/DL (ref 13–17.7)
HOLD SPECIMEN: NORMAL
LYMPHOCYTES # BLD AUTO: 39.81 10*3/MM3 (ref 0.7–3.1)
LYMPHOCYTES NFR BLD AUTO: 90.9 % (ref 19.6–45.3)
MCH RBC QN AUTO: 31.9 PG (ref 26.6–33)
MCHC RBC AUTO-ENTMCNC: 34.8 G/DL (ref 31.5–35.7)
MCV RBC AUTO: 91.6 FL (ref 79–97)
MONOCYTES # BLD AUTO: 0.36 10*3/MM3 (ref 0.1–0.9)
MONOCYTES NFR BLD AUTO: 0.8 % (ref 5–12)
NEUTROPHILS NFR BLD AUTO: 3.34 10*3/MM3 (ref 1.7–7)
NEUTROPHILS NFR BLD AUTO: 7.7 % (ref 42.7–76)
PLATELET # BLD AUTO: 205 10*3/MM3 (ref 140–450)
PMV BLD AUTO: 9.4 FL (ref 6–12)
RBC # BLD AUTO: 4.64 10*6/MM3 (ref 4.14–5.8)
WBC # BLD AUTO: 43.78 10*3/MM3 (ref 3.4–10.8)

## 2020-10-09 PROCEDURE — 36415 COLL VENOUS BLD VENIPUNCTURE: CPT

## 2020-10-09 PROCEDURE — 85025 COMPLETE CBC W/AUTO DIFF WBC: CPT

## 2020-10-09 NOTE — TELEPHONE ENCOUNTER
Received call from [facility] with CRITICAL LAB VALUE:    WBC: 43.78  This information was relayed to Dr San for review    NOTE: CLL

## 2021-01-04 ENCOUNTER — TELEPHONE (OUTPATIENT)
Dept: ONCOLOGY | Facility: CLINIC | Age: 56
End: 2021-01-04

## 2021-01-04 ENCOUNTER — LAB (OUTPATIENT)
Dept: LAB | Facility: HOSPITAL | Age: 56
End: 2021-01-04

## 2021-01-04 DIAGNOSIS — C91.10 CLL (CHRONIC LYMPHOCYTIC LEUKEMIA) (HCC): ICD-10-CM

## 2021-01-04 LAB
ALBUMIN SERPL-MCNC: 4.5 G/DL (ref 3.5–5.2)
ALBUMIN/GLOB SERPL: 2 G/DL
ALP SERPL-CCNC: 132 U/L (ref 39–117)
ALT SERPL W P-5'-P-CCNC: 60 U/L (ref 1–41)
ANION GAP SERPL CALCULATED.3IONS-SCNC: 10 MMOL/L (ref 5–15)
AST SERPL-CCNC: 75 U/L (ref 1–40)
BILIRUB SERPL-MCNC: 1.5 MG/DL (ref 0–1.2)
BUN SERPL-MCNC: 20 MG/DL (ref 6–20)
BUN/CREAT SERPL: 25 (ref 7–25)
CALCIUM SPEC-SCNC: 9.3 MG/DL (ref 8.6–10.5)
CHLORIDE SERPL-SCNC: 103 MMOL/L (ref 98–107)
CO2 SERPL-SCNC: 26 MMOL/L (ref 22–29)
CREAT SERPL-MCNC: 0.8 MG/DL (ref 0.76–1.27)
DEPRECATED RDW RBC AUTO: 43.5 FL (ref 37–54)
ERYTHROCYTE [DISTWIDTH] IN BLOOD BY AUTOMATED COUNT: 13.2 % (ref 12.3–15.4)
GFR SERPL CREATININE-BSD FRML MDRD: 100 ML/MIN/1.73
GLOBULIN UR ELPH-MCNC: 2.3 GM/DL
GLUCOSE SERPL-MCNC: 137 MG/DL (ref 65–99)
HCT VFR BLD AUTO: 42.4 % (ref 37.5–51)
HGB BLD-MCNC: 15.2 G/DL (ref 13–17.7)
LYMPHOCYTES # BLD MANUAL: 41.72 10*3/MM3 (ref 0.7–3.1)
LYMPHOCYTES NFR BLD MANUAL: 93 % (ref 19.6–45.3)
MCH RBC QN AUTO: 32.5 PG (ref 26.6–33)
MCHC RBC AUTO-ENTMCNC: 35.8 G/DL (ref 31.5–35.7)
MCV RBC AUTO: 90.8 FL (ref 79–97)
NEUTROPHILS # BLD AUTO: 3.14 10*3/MM3 (ref 1.7–7)
NEUTROPHILS NFR BLD MANUAL: 7 % (ref 42.7–76)
PLAT MORPH BLD: NORMAL
PLATELET # BLD AUTO: 212 10*3/MM3 (ref 140–450)
PMV BLD AUTO: 9.6 FL (ref 6–12)
POIKILOCYTOSIS BLD QL SMEAR: ABNORMAL
POTASSIUM SERPL-SCNC: 3.7 MMOL/L (ref 3.5–5.2)
PROT SERPL-MCNC: 6.8 G/DL (ref 6–8.5)
RBC # BLD AUTO: 4.67 10*6/MM3 (ref 4.14–5.8)
SMUDGE CELLS BLD QL SMEAR: ABNORMAL
SODIUM SERPL-SCNC: 139 MMOL/L (ref 136–145)
WBC # BLD AUTO: 44.86 10*3/MM3 (ref 3.4–10.8)

## 2021-01-04 PROCEDURE — 85025 COMPLETE CBC W/AUTO DIFF WBC: CPT

## 2021-01-04 PROCEDURE — 85007 BL SMEAR W/DIFF WBC COUNT: CPT

## 2021-01-04 PROCEDURE — 36415 COLL VENOUS BLD VENIPUNCTURE: CPT

## 2021-01-04 PROCEDURE — 80053 COMPREHEN METABOLIC PANEL: CPT

## 2021-02-15 NOTE — PROGRESS NOTES
[facility] ON[facility] GROUP HEMATOLOGY AND ONCOLOGY  [address]  [facility] [place] [address] [phone]    Patient Name: Rico Enrique  Encounter Date: 02/22/2021  YOB: 1965  Patient Number: [id number]      REASON FOR FOLLOW-UP: Rico Enrique is a pleasant 55-year-old  male who is seen on followup for chronic lymphocytic leukemia/small lymphocytic lymphoma (CLL/SLL) monoclonal kappa, Stage 0.  He is off therapy.  The patient is here alone. History is obtained from the patient who is considered to be a marginal historian.       I have reviewed the HPI and verified with the patient the accuracy of it. No changes to interval history since the information was documented. Stanley San MD 02/22/21         Problem List Items Addressed This Visit        Other    CLL (chronic lymphocytic leukemia) (CMS/[facility]) - Primary    Overview     DIAGNOSTIC ABNORMALITIES:  CBC from [facility] 02/05/2013 revealed a WBC of 15.03, hemoglobin 15.6, hematocrit 43.3, MCV 88.4, platelet count 300,000. Absolute lymphocyte count was elevated at 9.7. ANC was 4.7.  PATHOLOGY:  [facility], Fluorescence in-situ Hybridization (FISH ) report, 04/04/2013. 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 Deletion 13q is the most common genetic abnormality in CLL, found in 36-64% of cases. As the sole abnormality, it has a good prognostic value. Both the disease free interval and overall survival are better than in cases with a normal karyotype because of slow disease progression. Monitoring for deletion 13q may be useful in assessing the patient's remission/relapse status. No gene rearrangements characteristic for t(11;14) or abnormalities involving the target regions of chromosomes 6, 11, 12, or 17 were detected. However, it should  be noted that the FISH probes utilized in this analysis cannot entirely exclude the presence of other chromosomal abnormalities. Correlation with classic cytogenetics, clinical, flow cytometric, and morphological data is recommended, if available. 04/10/2013 JF  Peripheral blood, 04/03/2013 ([facility]).  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  Comments:  Flow cytometry identified an abnormal low kappa light chain-restricted B cell population showing coexpression of CD20, CD5, and CD23 without significant expression of CD10, FMC7 or CD79b.  The abnormal B cells are of a predominantly small size and account for approximately 96.2% of the B cells and 37.0% of the total white blood cells.  Roberson-Giemsa stained cytospin preparation shows atypical lymphocytes of a predominantly small to intermediate size.  These findings are indicative of involvement by a mature small B cell neoplasm. The cytomorphologic features and immunophenotype of the neoplastic B cells are consistent with CLL/SLL.  A 1.9% subset of the CD5-positive B cells coexpresses CD38. Of note, in the majority of patients, expression of CD38 on less than 30% of CD5-positive CLL/SLL B cells has been associated with more favorable prognosis. FISH studies for CLL associated genetic abnormalities and IgVH hypermutation analysis by PCR are underway and will be reported separately. Correlation with morphological findings and close clinical followup are recommended.   Abdominal ultrasound done at [facility] on 04/12/2013. IMPRESSION:  1. Mobile gallstone within the lumen of the gallbladder. No gallbladder wall thickening or pericholecystic fluid. No biliary dilatation. Findings suggest cholelithiasis. 2. Echogenic appearance to the liver parenchyma may represent diffuse fatty infiltration. 3. Normal appearance to the kidneys, spleen, and visible portions of the pancreas.   CT  of the abdomen and pelvis done on 03/30/2015 at [facility]. Impression: No CT evidence of neoplastic disease. No acute intra-abdominal/pelvic pathological process. Cholelithiasis. Colonic diverticulosis without CT evidence of acute diverticulitis. Bilateral spondylolysis at L5. Mild spondylolisthesis not excluded.   Ultrasound of the abdomen done on 03/30/2015 at [facility]. Impression: Cholelithiasis. Steatosis of the liver.       PREVIOUS INTERVENTIONS:  Observation.         Relevant Orders    CBC & Differential    Comprehensive Metabolic Panel        Oncology/Hematology History Overview Note   DIAGNOSTIC ABNORMALITIES:  CBC from [facility] 02/05/2013 revealed a WBC of 15.03, hemoglobin 15.6, hematocrit 43.3, MCV 88.4, platelet count 300,000. Absolute lymphocyte count was elevated at 9.7. ANC was 4.7.  PATHOLOGY:  [facility], Fluorescence in-situ Hybridization (FISH ) report, 04/04/2013. 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 Deletion 13q is the most common genetic abnormality in CLL, found in 36-64% of cases. As the sole abnormality, it has a good prognostic value. Both the disease free interval and overall survival are better than in cases with a normal karyotype because of slow disease progression. Monitoring for deletion 13q may be useful in assessing the patient's remission/relapse status. No gene rearrangements characteristic for t(11;14) or abnormalities involving the target regions of chromosomes 6, 11, 12, or 17 were detected. However, it should be noted that the FISH probes utilized in this analysis cannot entirely exclude the presence of other chromosomal abnormalities. Correlation with classic cytogenetics, clinical, flow cytometric, and morphological data is recommended, if available. 04/10/2013  JF  Peripheral blood, 04/03/2013 ([facility]).  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  Comments:  Flow cytometry identified an abnormal low kappa light chain-restricted B cell population showing coexpression of CD20, CD5, and CD23 without significant expression of CD10, FMC7 or CD79b.  The abnormal B cells are of a predominantly small size and account for approximately 96.2% of the B cells and 37.0% of the total white blood cells.  Roberson-Giemsa stained cytospin preparation shows atypical lymphocytes of a predominantly small to intermediate size.  These findings are indicative of involvement by a mature small B cell neoplasm. The cytomorphologic features and immunophenotype of the neoplastic B cells are consistent with CLL/SLL.  A 1.9% subset of the CD5-positive B cells coexpresses CD38. Of note, in the majority of patients, expression of CD38 on less than 30% of CD5-positive CLL/SLL B cells has been associated with more favorable prognosis. FISH studies for CLL associated genetic abnormalities and IgVH hypermutation analysis by PCR are underway and will be reported separately. Correlation with morphological findings and close clinical followup are recommended.   Abdominal ultrasound done at [facility] on 04/12/2013. IMPRESSION:  1. Mobile gallstone within the lumen of the gallbladder. No gallbladder wall thickening or pericholecystic fluid. No biliary dilatation. Findings suggest cholelithiasis. 2. Echogenic appearance to the liver parenchyma may represent diffuse fatty infiltration. 3. Normal appearance to the kidneys, spleen, and visible portions of the pancreas.   CT of the abdomen and pelvis done on 03/30/2015 at [facility]. Impression: No CT evidence of neoplastic disease. No acute intra-abdominal/pelvic pathological process. Cholelithiasis. Colonic diverticulosis without CT evidence of acute  diverticulitis. Bilateral spondylolysis at L5. Mild spondylolisthesis not excluded.   Ultrasound of the abdomen done on 03/30/2015 at [facility]. Impression: Cholelithiasis. Steatosis of the liver.       PREVIOUS INTERVENTIONS:  Observation.     CLL (chronic lymphocytic leukemia) (CMS/HCC)   12/5/2019 Initial Diagnosis    CLL (chronic lymphocytic leukemia) (CMS/HCC)         PAST MEDICAL HISTORY:  ALLERGIES:  No Known Allergies  CURRENT MEDICATIONS:  Outpatient Encounter Medications as of 2/22/2021   Medication Sig Dispense Refill   • Glucosamine-Chondroitin (GLUCOSAMINE CHONDR COMPLEX PO) Take  by mouth Daily.     • lovastatin (MEVACOR) 20 MG tablet Take 40 mg by mouth Every Night.     • NIACIN FLUSH FREE 500 MG capsule TK 1 C PO QD  5   • OMEGA-3 FATTY ACIDS PO Take  by mouth.     • valsartan-hydrochlorothiazide (DIOVAN-HCT) 160-25 MG per tablet TK 1 T PO QD       No facility-administered encounter medications on file as of 2/22/2021.      ADULT ILLNESSES:  Patient Active Problem List   Diagnosis Code   • Family hx colonic polyps Z83.71   • Family hx of colon cancer Z80.0   • Hx of colonic polyp Z86.010   • Cervical lymphadenopathy R59.0   • CLL (chronic lymphocytic leukemia) (CMS/HCC) C91.10     SURGERIES:  Past Surgical History:   Procedure Laterality Date   • COLONOSCOPY  02/15/2013   • COLONOSCOPY N/A 3/19/2018    Procedure: COLONOSCOPY WITH ANESTHESIA;  Surgeon: Leonidas Ibanez MD;  Location: [facility] ENDOSCOPY;  Service: Gastroenterology   • EYE SURGERY     • TONSILLECTOMY AND ADENOIDECTOMY       HEALTH MAINTENANCE ITEMS:  Health Maintenance Due   Topic Date Due   • ANNUAL PHYSICAL  08/16/1966   • TDAP/TD VACCINES (1 - Tdap) 08/16/1982   • ZOSTER VACCINE (1 of 2) 08/16/2013   • HEPATITIS C SCREENING  11/16/2017       <no information>  Last Completed Colonoscopy       Status Date      COLONOSCOPY Done 3/19/2018 COLONOSCOPY     Patient has more history with this topic...          There is no  "immunization history on file for this patient.  Last Completed Mammogram     Patient has no health maintenance due at this time            FAMILY HISTORY:  Family History   Problem Relation Age of Onset   • Colon cancer Maternal Aunt    • Colon polyps Maternal Uncle    • Colon cancer Maternal Aunt    • Colon cancer Paternal Grandmother      SOCIAL HISTORY:  Social History     Socioeconomic History   • Marital status:      Spouse name: Not on file   • Number of children: Not on file   • Years of education: Not on file   • Highest education level: Not on file   Tobacco Use   • Smoking status: Never Smoker   • Smokeless tobacco: Never Used   Substance and Sexual Activity   • Alcohol use: No     Comment: rare   • Drug use: No   • Sexual activity: Defer       REVIEW OF SYSTEMS:    Review of Systems   Constitutional: Negative for chills, fatigue and fever.        \"I feel fine.\"   Respiratory: Negative for cough, shortness of breath and wheezing.    Cardiovascular: Negative for chest pain and palpitations.   Gastrointestinal: Negative for abdominal pain, constipation and vomiting.   Genitourinary: Negative for difficulty urinating, dysuria and flank pain.   Musculoskeletal: Negative for gait problem and joint swelling.   Skin: Negative for pallor.   Neurological: Negative for speech difficulty, weakness and confusion.   Hematological: Negative for adenopathy. Does not bruise/bleed easily.   Psychiatric/Behavioral: Negative for agitation, hallucinations and depressed mood.       VITAL SIGNS: /76   Pulse 76   Temp 98.6 °F (37 °C)   Resp 18   Ht 182.9 cm (72.01\")   Wt 102 kg (224 lb)   SpO2 98%   BMI 30.37 kg/m²  Body surface area is 2.24 meters squared.   Pain Score    02/22/21 0817   PainSc: 0-No pain       PHYSICAL EXAMINATION:     Physical Exam  Vitals signs reviewed.   Constitutional:       General: He is not in acute distress.     Appearance: He is obese.   Cardiovascular:      Rate and Rhythm: " Normal rate and regular rhythm.   Pulmonary:      Effort: No respiratory distress.      Breath sounds: No wheezing or rales.   Abdominal:      General: Bowel sounds are normal.      Palpations: Abdomen is soft.      Tenderness: There is no abdominal tenderness.   Musculoskeletal:         General: No swelling.   Skin:     General: Skin is warm and dry.      Coloration: Skin is not pale.   Neurological:      Mental Status: He is alert and oriented to person, place, and time.   Psychiatric:         Mood and Affect: Mood normal.         Behavior: Behavior normal.         Thought Content: Thought content normal.         Judgment: Judgment normal.         LABS    Lab Results - Last 18 Months   Lab Units 01/04/21  0736 10/09/20  0916 06/18/20  0701 03/12/20  0707 12/05/19  0733 10/03/19  0730   HEMOGLOBIN g/dL 15.2 14.8 14.7 15.3 14.6 14.8   HEMATOCRIT % 42.4 42.5 43.0 44.3 42.6 43.0   MCV fL 90.8 91.6 92.7 92.1 93.4 93.1   WBC 10*3/mm3 44.86* 43.78* 43.18* 40.81* 41.37* 41.18*   RDW % 13.2 13.1 13.4 13.1 13.7 13.2   MPV fL 9.6 9.4 9.8 9.2 9.2 9.7   PLATELETS 10*3/mm3 212 205 218 230 280 221   NEUTROS ABS 10*3/mm3 3.14 3.34 3.89 5.26 7.69* 1.65*   LYMPHS ABS 10*3/mm3  --  39.81*  --   --   --   --    MONOS ABS 10*3/mm3  --  0.36  --   --   --   --    EOS ABS 10*3/mm3  --  0.12 0.43* 0.45*  --   --    BASOS ABS 10*3/mm3  --  0.09  --   --   --   --    NEUTROPHIL % % 7.0*  --  9.0* 12.9* 18.6* 4.0*   MONOCYTES % %  --   --  2.0*  --   --  1.0*   ATYP LYMPH % %  --   --   --  3.2 1.0  --        Lab Results - Last 18 Months   Lab Units 01/04/21  0736 06/18/20  0701 12/05/19  0733 10/03/19  0730   GLUCOSE mg/dL 137* 125* 119* 121*   SODIUM mmol/L 139 140 141 140   POTASSIUM mmol/L 3.7 3.7 4.0 3.5   CO2 mmol/L 26.0 25.0 29.0 28.0   CHLORIDE mmol/L 103 101 103 99   ANION GAP mmol/L 10.0 14.0 9.0 13.0   CREATININE mg/dL 0.80 0.78 0.81 0.83   BUN mg/dL 20 26* 23* 21*   BUN / CREAT RATIO  25.0 33.3* 28.4* 25.3*   CALCIUM mg/dL 9.3  9.6 9.7 9.2   EGFR IF NONAFRICN AM mL/min/1.73 100 103 99 96   ALK PHOS U/L 132* 123* 96 106   TOTAL PROTEIN g/dL 6.8 6.7 6.9 6.8   ALT (SGPT) U/L 60* 91* 99* 71*   AST (SGOT) U/L 75* 105* 125* 74*   BILIRUBIN mg/dL 1.5* 1.7* 1.6* 1.7*   ALBUMIN g/dL 4.50 4.60 4.50 4.40   GLOBULIN gm/dL 2.3 2.1 2.4 2.4       No results for input(s): MSPIKE, KAPPALAMB, IGLFLC, URICACID, FREEKAPPAL, CEA, LDH, REFLABREPO in the last 18408 hours.    No results for input(s): IRON, TIBC, LABIRON, FERRITIN, U5WUQEO, TSH, FOLATE in the last 73215 hours.    Invalid input(s): VITB12      Rico Corderoy reports a pain score of 0.        Patient's Body mass index is 30.37 kg/m². BMI is above normal parameters. Recommendations include: referral to primary care.        ASSESSMENT:  1.    Chronic lymphocytic leukemia/small lymphocytic lymphoma (CLL/SLL) monoclonal kappa.  Stage 0.  Asymptomatic.  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  Complications: None.  Treatment status: Observation.  Prognosis: Good.  2.    Hypertension. On Diovan.   3.    Elevated liver function tests from statin, cholelithiasis, and fatty liver.  4.    1.5 cm right level II node, followed by Dr. Hernandez.    5.    Obesity BM 30.3.   6.    Basal cell cancer forehead, excised.  7.    Genital herpes.  8.    Cyst, left kidney.   9.    Cholelithiasis.  10.  Elevated PSA.        PLAN:  1.    Re:   Heme status.  WBC 44.8, lymphocytosis 41.7 from 39.8, hemoglobin 15.2 and platelet 212.    No rapid doubling.  No cytopenias.  2.    Re:  Pre-office CMP.   Bilirubin at 1.5 from 1.7, AST 75 from 105 and ALT 60 from 91.  Positive for gallstones.   3.    Re:  Interval CBC 10/09/2020.  White count 43.8, hemoglobin 14.8 gm, and platelet 205.     4.    Re:  No indications for CLL therapy.  No rapid doubling of lymphocytes, fevers, recurrent infections, or symptomatic adenopathies.  5.    Re:  Stable for observation.    6.   CBC with differential every 3  months.  7.   Plan of care discussed with patient.  Understanding expressed.  Patient agreeable to proceed.  8.   Continue ongoing management per primary care physician and other specialists.  9.   Return to office in 6 months with pre-office CMP and CBC and differential.          I spent 20 total minutes, face-to-face, caring for Rico today.  Greater than 50% of this time involved counseling and/or coordination of care as documented within this note regarding the patient's illness(es), pros and cons of various treatment options, instructions and/or risk reduction.          cc:   Konstantin Fleming MD          (Maggie Thompson MD)          (Fredy Waldrop MD)          (Yang Hernandez MD)          (Leonidas Ibanez MD)

## 2021-02-22 ENCOUNTER — OFFICE VISIT (OUTPATIENT)
Dept: ONCOLOGY | Facility: CLINIC | Age: 56
End: 2021-02-22

## 2021-02-22 VITALS
HEART RATE: 76 BPM | WEIGHT: 224 LBS | RESPIRATION RATE: 18 BRPM | TEMPERATURE: 98.6 F | DIASTOLIC BLOOD PRESSURE: 76 MMHG | HEIGHT: 72 IN | SYSTOLIC BLOOD PRESSURE: 142 MMHG | OXYGEN SATURATION: 98 % | BODY MASS INDEX: 30.34 KG/M2

## 2021-02-22 DIAGNOSIS — C91.10 CLL (CHRONIC LYMPHOCYTIC LEUKEMIA) (HCC): Primary | ICD-10-CM

## 2021-02-22 PROCEDURE — 99213 OFFICE O/P EST LOW 20 MIN: CPT | Performed by: INTERNAL MEDICINE

## 2021-04-01 ENCOUNTER — OFFICE VISIT (OUTPATIENT)
Age: 56
End: 2021-04-01

## 2021-04-01 VITALS — OXYGEN SATURATION: 96 % | HEART RATE: 79 BPM | TEMPERATURE: 96.9 F

## 2021-04-01 DIAGNOSIS — Z11.59 SCREENING FOR VIRAL DISEASE: Primary | ICD-10-CM

## 2021-04-01 PROCEDURE — 99999 PR OFFICE/OUTPT VISIT,PROCEDURE ONLY: CPT | Performed by: NURSE PRACTITIONER

## 2021-04-03 LAB — SARS-COV-2, NAA: DETECTED

## 2021-05-24 ENCOUNTER — LAB (OUTPATIENT)
Dept: LAB | Facility: HOSPITAL | Age: 56
End: 2021-05-24

## 2021-05-24 DIAGNOSIS — C91.10 CLL (CHRONIC LYMPHOCYTIC LEUKEMIA) (HCC): Primary | ICD-10-CM

## 2021-05-24 LAB
BASOPHILS # BLD AUTO: 0.19 10*3/MM3 (ref 0–0.2)
BASOPHILS NFR BLD AUTO: 0.4 % (ref 0–1.5)
DEPRECATED RDW RBC AUTO: 45.7 FL (ref 37–54)
EOSINOPHIL # BLD AUTO: 0.15 10*3/MM3 (ref 0–0.4)
EOSINOPHIL NFR BLD AUTO: 0.3 % (ref 0.3–6.2)
ERYTHROCYTE [DISTWIDTH] IN BLOOD BY AUTOMATED COUNT: 13.4 % (ref 12.3–15.4)
HCT VFR BLD AUTO: 44.3 % (ref 37.5–51)
HGB BLD-MCNC: 15.1 G/DL (ref 13–17.7)
HOLD SPECIMEN: NORMAL
LYMPHOCYTES # BLD AUTO: 39.59 10*3/MM3 (ref 0.7–3.1)
LYMPHOCYTES NFR BLD AUTO: 82.9 % (ref 19.6–45.3)
MCH RBC QN AUTO: 31.5 PG (ref 26.6–33)
MCHC RBC AUTO-ENTMCNC: 34.1 G/DL (ref 31.5–35.7)
MCV RBC AUTO: 92.5 FL (ref 79–97)
MONOCYTES # BLD AUTO: 3.27 10*3/MM3 (ref 0.1–0.9)
MONOCYTES NFR BLD AUTO: 6.9 % (ref 5–12)
NEUTROPHILS NFR BLD AUTO: 4.43 10*3/MM3 (ref 1.7–7)
NEUTROPHILS NFR BLD AUTO: 9.3 % (ref 42.7–76)
PLATELET # BLD AUTO: 221 10*3/MM3 (ref 140–450)
PMV BLD AUTO: 9.5 FL (ref 6–12)
RBC # BLD AUTO: 4.79 10*6/MM3 (ref 4.14–5.8)
SMUDGE CELLS BLD QL SMEAR: NORMAL
WBC # BLD AUTO: 47.73 10*3/MM3 (ref 3.4–10.8)

## 2021-05-24 PROCEDURE — 85025 COMPLETE CBC W/AUTO DIFF WBC: CPT

## 2021-05-24 PROCEDURE — 85007 BL SMEAR W/DIFF WBC COUNT: CPT

## 2021-05-24 PROCEDURE — 36415 COLL VENOUS BLD VENIPUNCTURE: CPT

## 2021-08-12 NOTE — PROGRESS NOTES
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  He is off therapy.  The patient is here alone. History is obtained from the patient who is considered to be a marginal historian.         Problem List Items Addressed This Visit     None        Oncology/Hematology History Overview Note   DIAGNOSTIC ABNORMALITIES:  CBC from [facility] 02/05/2013 revealed a WBC of 15.03, hemoglobin 15.6, hematocrit 43.3, MCV 88.4, platelet count 300,000. Absolute lymphocyte count was elevated at 9.7. ANC was 4.7.  PATHOLOGY:  [facility], Fluorescence in-situ Hybridization (FISH ) report, 04/04/2013. 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 Deletion 13q is the most common genetic abnormality in CLL, found in 36-64% of cases. As the sole abnormality, it has a good prognostic value. Both the disease free interval and overall survival are better than in cases with a normal karyotype because of slow disease progression. Monitoring for deletion 13q may be useful in assessing the patient's remission/relapse status. No gene rearrangements characteristic for t(11;14) or abnormalities involving the target regions of chromosomes 6, 11, 12, or 17 were detected. However, it should be noted that the FISH probes utilized in this analysis cannot entirely exclude the presence of other chromosomal abnormalities. Correlation with classic cytogenetics, clinical, flow cytometric, and  morphological data is recommended, if available. 04/10/2013 JF  Peripheral blood, 04/03/2013 ([facility]).  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  Comments:  Flow cytometry identified an abnormal low kappa light chain-restricted B cell population showing coexpression of CD20, CD5, and CD23 without significant expression of CD10, FMC7 or CD79b.  The abnormal B cells are of a predominantly small size and account for approximately 96.2% of the B cells and 37.0% of the total white blood cells.  Roberson-Giemsa stained cytospin preparation shows atypical lymphocytes of a predominantly small to intermediate size.  These findings are indicative of involvement by a mature small B cell neoplasm. The cytomorphologic features and immunophenotype of the neoplastic B cells are consistent with CLL/SLL.  A 1.9% subset of the CD5-positive B cells coexpresses CD38. Of note, in the majority of patients, expression of CD38 on less than 30% of CD5-positive CLL/SLL B cells has been associated with more favorable prognosis. FISH studies for CLL associated genetic abnormalities and IgVH hypermutation analysis by PCR are underway and will be reported separately. Correlation with morphological findings and close clinical followup are recommended.   Abdominal ultrasound done at [facility] on 04/12/2013. IMPRESSION:  1. Mobile gallstone within the lumen of the gallbladder. No gallbladder wall thickening or pericholecystic fluid. No biliary dilatation. Findings suggest cholelithiasis. 2. Echogenic appearance to the liver parenchyma may represent diffuse fatty infiltration. 3. Normal appearance to the kidneys, spleen, and visible portions of the pancreas.   CT of the abdomen and pelvis done on 03/30/2015 at [facility]. Impression: No CT evidence of neoplastic disease. No acute intra-abdominal/pelvic pathological process. Cholelithiasis.  Colonic diverticulosis without CT evidence of acute diverticulitis. Bilateral spondylolysis at L5. Mild spondylolisthesis not excluded.   Ultrasound of the abdomen done on 03/30/2015 at [facility]. Impression: Cholelithiasis. Steatosis of the liver.       PREVIOUS INTERVENTIONS:  Observation.     CLL (chronic lymphocytic leukemia) (CMS/HCC)   12/5/2019 Initial Diagnosis    CLL (chronic lymphocytic leukemia) (CMS/HCC)         PAST MEDICAL HISTORY:  ALLERGIES:  No Known Allergies  CURRENT MEDICATIONS:  Outpatient Encounter Medications as of 8/23/2021   Medication Sig Dispense Refill   • Glucosamine-Chondroitin (GLUCOSAMINE CHONDR COMPLEX PO) Take  by mouth Daily.     • lovastatin (MEVACOR) 20 MG tablet Take 40 mg by mouth Every Night.     • NIACIN FLUSH FREE 500 MG capsule TK 1 C PO QD  5   • OMEGA-3 FATTY ACIDS PO Take  by mouth.     • valsartan-hydrochlorothiazide (DIOVAN-HCT) 160-25 MG per tablet TK 1 T PO QD       No facility-administered encounter medications on file as of 8/23/2021.     ADULT ILLNESSES:  Patient Active Problem List   Diagnosis Code   • Family hx colonic polyps Z83.71   • Family hx of colon cancer Z80.0   • Hx of colonic polyp Z86.010   • Cervical lymphadenopathy R59.0   • CLL (chronic lymphocytic leukemia) (CMS/HCC) C91.10     SURGERIES:  Past Surgical History:   Procedure Laterality Date   • COLONOSCOPY  02/15/2013   • COLONOSCOPY N/A 3/19/2018    Procedure: COLONOSCOPY WITH ANESTHESIA;  Surgeon: Leonidas Ibanez MD;  Location: [facility] ENDOSCOPY;  Service: Gastroenterology   • EYE SURGERY     • TONSILLECTOMY AND ADENOIDECTOMY       HEALTH MAINTENANCE ITEMS:  Health Maintenance Due   Topic Date Due   • ANNUAL PHYSICAL  Never done   • COVID-19 Vaccine (1) Never done   • TDAP/TD VACCINES (1 - Tdap) Never done   • HEPATITIS C SCREENING  Never done       <no information>  Last Completed Colonoscopy     This patient has no relevant Health Maintenance data.          There is no  "immunization history on file for this patient.  Last Completed Mammogram     This patient has no relevant Health Maintenance data.            FAMILY HISTORY:  Family History   Problem Relation Age of Onset   • Colon cancer Maternal Aunt    • Colon polyps Maternal Uncle    • Colon cancer Maternal Aunt    • Colon cancer Paternal Grandmother      SOCIAL HISTORY:  Social History     Socioeconomic History   • Marital status:      Spouse name: Not on file   • Number of children: Not on file   • Years of education: Not on file   • Highest education level: Not on file   Tobacco Use   • Smoking status: Never Smoker   • Smokeless tobacco: Never Used   Substance and Sexual Activity   • Alcohol use: No     Comment: rare   • Drug use: No   • Sexual activity: Defer       REVIEW OF SYSTEMS:    Review of Systems   Constitutional: Negative for chills, fatigue, fever and unexpected weight loss.   Respiratory: Negative for cough and wheezing.    Cardiovascular: Negative for chest pain and leg swelling.   Gastrointestinal: Negative for abdominal pain, constipation, diarrhea, nausea and vomiting.   Genitourinary: Negative for dysuria and flank pain.   Skin: Negative for pallor.   Neurological: Negative for facial asymmetry, speech difficulty and confusion.   Hematological: Negative for adenopathy.   Psychiatric/Behavioral: Negative for agitation and hallucinations. The patient is not nervous/anxious.          VITAL SIGNS: /74   Pulse 84   Temp 97 °F (36.1 °C)   Resp 18   Ht 182.9 cm (72.01\")   Wt 96.9 kg (213 lb 11.2 oz)   SpO2 97%   BMI 28.97 kg/m²  Body surface area is 2.19 meters squared.   Pain Score    08/23/21 0820   PainSc: 0-No pain         PHYSICAL EXAMINATION:     Physical Exam  Vitals reviewed.   Constitutional:       General: He is not in acute distress.  Cardiovascular:      Rate and Rhythm: Normal rate and regular rhythm.   Pulmonary:      Effort: No respiratory distress.      Breath sounds: No wheezing " or rales.   Abdominal:      General: Bowel sounds are normal.      Palpations: Abdomen is soft.      Tenderness: There is no abdominal tenderness.   Musculoskeletal:         General: No swelling.   Skin:     General: Skin is warm and dry.      Coloration: Skin is not pale.   Neurological:      Mental Status: He is alert and oriented to person, place, and time.   Psychiatric:         Mood and Affect: Mood normal.         Behavior: Behavior normal.         Thought Content: Thought content normal.         Judgment: Judgment normal.         LABS    Lab Results - Last 18 Months   Lab Units 08/16/21  0706 05/24/21  0719 01/04/21  0736 10/09/20  0916 06/18/20  0701 03/12/20  0707   HEMOGLOBIN g/dL 15.1 15.1 15.2 14.8 14.7 15.3   HEMATOCRIT % 43.5 44.3 42.4 42.5 43.0 44.3   MCV fL 93.1 92.5 90.8 91.6 92.7 92.1   WBC 10*3/mm3 50.14* 47.73* 44.86* 43.78* 43.18* 40.81*   RDW % 13.6 13.4 13.2 13.1 13.4 13.1   MPV fL 9.3 9.5 9.6 9.4 9.8 9.2   PLATELETS 10*3/mm3 213 221 212 205 218 230   NEUTROS ABS 10*3/mm3 3.01 4.43 3.14 3.34 3.89 5.26   LYMPHS ABS 10*3/mm3  --  39.59*  --  39.81*  --   --    MONOS ABS 10*3/mm3  --  3.27*  --  0.36  --   --    EOS ABS 10*3/mm3  --  0.15  --  0.12 0.43* 0.45*   BASOS ABS 10*3/mm3  --  0.19  --  0.09  --   --    NEUTROPHIL % % 6.0*  --  7.0*  --  9.0* 12.9*   MONOCYTES % % 1.0*  --   --   --  2.0*  --    ATYP LYMPH % %  --   --   --   --   --  3.2       Lab Results - Last 18 Months   Lab Units 08/16/21  0706 01/04/21  0736 06/18/20  0701   GLUCOSE mg/dL 137* 137* 125*   SODIUM mmol/L 142 139 140   POTASSIUM mmol/L 4.9 3.7 3.7   CO2 mmol/L 27.0 26.0 25.0   CHLORIDE mmol/L 108* 103 101   ANION GAP mmol/L 7.0 10.0 14.0   CREATININE mg/dL 0.82 0.80 0.78   BUN mg/dL 18 20 26*   BUN / CREAT RATIO  22.0 25.0 33.3*   CALCIUM mg/dL 9.9 9.3 9.6   EGFR IF NONAFRICN AM mL/min/1.73 96 100 103   ALK PHOS U/L 127* 132* 123*   TOTAL PROTEIN g/dL 7.0 6.8 6.7   ALT (SGPT) U/L 36 60* 91*   AST (SGOT) U/L 33 75*  105*   BILIRUBIN mg/dL 1.9* 1.5* 1.7*   ALBUMIN g/dL 4.80 4.50 4.60   GLOBULIN gm/dL 2.2 2.3 2.1       No results for input(s): MSPIKE, KAPPALAMB, IGLFLC, URICACID, FREEKAPPAL, CEA, LDH, REFLABREPO in the last 78850 hours.    No results for input(s): IRON, TIBC, LABIRON, FERRITIN, [id number], TSH, FOLATE in the last 38121 hours.    Invalid input(s): VITB12      Rico Enrique reports a pain score of 0.        ASSESSMENT:  1.    Chronic lymphocytic leukemia/small lymphocytic lymphoma (CLL/SLL) monoclonal kappa.  Stage 0.  Asymptomatic.  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  Complications: None.  Treatment status: Observation.  Prognosis: Good.  2.    Hypertension. On Diovan.   3.    Elevated liver function tests from statin, cholelithiasis, and fatty liver.  4.    1.5 cm right level II node, followed by Dr. Hernandez.    5.    Basal cell cancer forehead, excised.  6.    Genital herpes, history of.  7.    Cyst, left kidney.   8.    Cholelithiasis.  9.    Elevated PSA. Followed by urology.        PLAN:  1.    Re:   Heme status.  WBC 50.14 from 47.7 from 44.8, lymphocytosis 46.6 from 39.5 from 41.7 from 39.8, hemoglobin 15.1 and platelet 213.    No rapid doubling.  No cytopenias.  2.    Re:  Pre-office CMP.   Bilirubin at 1.9 from 1.5 from 1.7, AST 33 from 75 from 105 and ALT 36 from 60 from 91.  Positive for gallstones.   3.    Re:  Interval CBC  05/24/2021.  WBC 47.7, lymphocyte 39.5, hemoglobin 15.1 and platelet 221.     4.    Re:  No indications for CLL therapy.  No rapid doubling of lymphocytes, fevers, recurrent infections, or symptomatic adenopathies.  5.    Re:  Stable for observation.    6.   CBC with differential every 3 months.  7.   Plan of care discussed with patient.  Understanding expressed.  Patient agreeable to proceed.  8.   Continue ongoing management per primary care physician and other specialists.  9.   Return to office in 6 months with pre-office CMP and CBC  and differential.       I have reviewed the assessment and plan and verified the accuracy of it. No changes to assessment and plan since the information was documented. Stanley San MD 08/23/21        I spent 25 total minutes, face-to-face, caring for Rico morgan.  Greater than 50% of this time involved counseling and/or coordination of care as documented within this note regarding the patient's illness(es), pros and cons of various treatment options, instructions and/or risk reduction.           cc:   Konstantin Fleming MD          (Maggie Thompson MD)          (Fredy Waldrop MD)          (Yang Hernandez MD)          (Leonidas Ibanez MD)

## 2021-08-16 ENCOUNTER — LAB (OUTPATIENT)
Dept: LAB | Facility: HOSPITAL | Age: 56
End: 2021-08-16

## 2021-08-16 DIAGNOSIS — C91.10 CLL (CHRONIC LYMPHOCYTIC LEUKEMIA) (HCC): ICD-10-CM

## 2021-08-16 LAB
ALBUMIN SERPL-MCNC: 4.8 G/DL (ref 3.5–5.2)
ALBUMIN/GLOB SERPL: 2.2 G/DL
ALP SERPL-CCNC: 127 U/L (ref 39–117)
ALT SERPL W P-5'-P-CCNC: 36 U/L (ref 1–41)
ANION GAP SERPL CALCULATED.3IONS-SCNC: 7 MMOL/L (ref 5–15)
AST SERPL-CCNC: 33 U/L (ref 1–40)
BILIRUB SERPL-MCNC: 1.9 MG/DL (ref 0–1.2)
BUN SERPL-MCNC: 18 MG/DL (ref 6–20)
BUN/CREAT SERPL: 22 (ref 7–25)
CALCIUM SPEC-SCNC: 9.9 MG/DL (ref 8.6–10.5)
CHLORIDE SERPL-SCNC: 108 MMOL/L (ref 98–107)
CO2 SERPL-SCNC: 27 MMOL/L (ref 22–29)
CREAT SERPL-MCNC: 0.82 MG/DL (ref 0.76–1.27)
CYTOLOGIST CVX/VAG CYTO: NORMAL
DEPRECATED RDW RBC AUTO: 46.5 FL (ref 37–54)
ERYTHROCYTE [DISTWIDTH] IN BLOOD BY AUTOMATED COUNT: 13.6 % (ref 12.3–15.4)
GFR SERPL CREATININE-BSD FRML MDRD: 96 ML/MIN/1.73
GLOBULIN UR ELPH-MCNC: 2.2 GM/DL
GLUCOSE SERPL-MCNC: 137 MG/DL (ref 65–99)
HCT VFR BLD AUTO: 43.5 % (ref 37.5–51)
HGB BLD-MCNC: 15.1 G/DL (ref 13–17.7)
LYMPHOCYTES # BLD MANUAL: 46.63 10*3/MM3 (ref 0.7–3.1)
LYMPHOCYTES NFR BLD MANUAL: 1 % (ref 5–12)
LYMPHOCYTES NFR BLD MANUAL: 93 % (ref 19.6–45.3)
MCH RBC QN AUTO: 32.3 PG (ref 26.6–33)
MCHC RBC AUTO-ENTMCNC: 34.7 G/DL (ref 31.5–35.7)
MCV RBC AUTO: 93.1 FL (ref 79–97)
MONOCYTES # BLD AUTO: 0.5 10*3/MM3 (ref 0.1–0.9)
NEUTROPHILS # BLD AUTO: 3.01 10*3/MM3 (ref 1.7–7)
NEUTROPHILS NFR BLD MANUAL: 6 % (ref 42.7–76)
PATH INTERP BLD-IMP: NORMAL
PLATELET # BLD AUTO: 213 10*3/MM3 (ref 140–450)
PMV BLD AUTO: 9.3 FL (ref 6–12)
POTASSIUM SERPL-SCNC: 4.9 MMOL/L (ref 3.5–5.2)
PROT SERPL-MCNC: 7 G/DL (ref 6–8.5)
RBC # BLD AUTO: 4.67 10*6/MM3 (ref 4.14–5.8)
RBC MORPH BLD: NORMAL
SMALL PLATELETS BLD QL SMEAR: ADEQUATE
SODIUM SERPL-SCNC: 142 MMOL/L (ref 136–145)
WBC # BLD AUTO: 50.14 10*3/MM3 (ref 3.4–10.8)
WBC MORPH BLD: NORMAL

## 2021-08-16 PROCEDURE — 85060 BLOOD SMEAR INTERPRETATION: CPT

## 2021-08-16 PROCEDURE — 85025 COMPLETE CBC W/AUTO DIFF WBC: CPT

## 2021-08-16 PROCEDURE — 80053 COMPREHEN METABOLIC PANEL: CPT

## 2021-08-16 PROCEDURE — 36415 COLL VENOUS BLD VENIPUNCTURE: CPT

## 2021-08-16 PROCEDURE — 85007 BL SMEAR W/DIFF WBC COUNT: CPT

## 2021-08-23 ENCOUNTER — OFFICE VISIT (OUTPATIENT)
Dept: ONCOLOGY | Facility: CLINIC | Age: 56
End: 2021-08-23

## 2021-08-23 VITALS
OXYGEN SATURATION: 97 % | BODY MASS INDEX: 28.94 KG/M2 | DIASTOLIC BLOOD PRESSURE: 74 MMHG | HEIGHT: 72 IN | SYSTOLIC BLOOD PRESSURE: 140 MMHG | HEART RATE: 84 BPM | TEMPERATURE: 97 F | RESPIRATION RATE: 18 BRPM | WEIGHT: 213.7 LBS

## 2021-08-23 DIAGNOSIS — C91.10 CLL (CHRONIC LYMPHOCYTIC LEUKEMIA) (HCC): Primary | ICD-10-CM

## 2021-08-23 PROCEDURE — 99213 OFFICE O/P EST LOW 20 MIN: CPT | Performed by: INTERNAL MEDICINE

## 2021-11-22 ENCOUNTER — LAB (OUTPATIENT)
Dept: LAB | Facility: HOSPITAL | Age: 56
End: 2021-11-22

## 2021-11-22 DIAGNOSIS — C91.10 CLL (CHRONIC LYMPHOCYTIC LEUKEMIA) (HCC): ICD-10-CM

## 2021-11-22 LAB
ALBUMIN SERPL-MCNC: 4.7 G/DL (ref 3.5–5.2)
ALBUMIN/GLOB SERPL: 2 G/DL
ALP SERPL-CCNC: 117 U/L (ref 39–117)
ALT SERPL W P-5'-P-CCNC: 39 U/L (ref 1–41)
ANION GAP SERPL CALCULATED.3IONS-SCNC: 9 MMOL/L (ref 5–15)
AST SERPL-CCNC: 47 U/L (ref 1–40)
BILIRUB SERPL-MCNC: 1.5 MG/DL (ref 0–1.2)
BUN SERPL-MCNC: 18 MG/DL (ref 6–20)
BUN/CREAT SERPL: 20 (ref 7–25)
CALCIUM SPEC-SCNC: 9.5 MG/DL (ref 8.6–10.5)
CHLORIDE SERPL-SCNC: 105 MMOL/L (ref 98–107)
CO2 SERPL-SCNC: 27 MMOL/L (ref 22–29)
CREAT SERPL-MCNC: 0.9 MG/DL (ref 0.76–1.27)
DEPRECATED RDW RBC AUTO: 46 FL (ref 37–54)
EOSINOPHIL # BLD MANUAL: 0.64 10*3/MM3 (ref 0–0.4)
EOSINOPHIL NFR BLD MANUAL: 1.1 % (ref 0.3–6.2)
ERYTHROCYTE [DISTWIDTH] IN BLOOD BY AUTOMATED COUNT: 13.2 % (ref 12.3–15.4)
GFR SERPL CREATININE-BSD FRML MDRD: 87 ML/MIN/1.73
GLOBULIN UR ELPH-MCNC: 2.3 GM/DL
GLUCOSE SERPL-MCNC: 118 MG/DL (ref 65–99)
HCT VFR BLD AUTO: 48.5 % (ref 37.5–51)
HGB BLD-MCNC: 16 G/DL (ref 13–17.7)
LYMPHOCYTES # BLD MANUAL: 51.98 10*3/MM3 (ref 0.7–3.1)
MCH RBC QN AUTO: 31.3 PG (ref 26.6–33)
MCHC RBC AUTO-ENTMCNC: 33 G/DL (ref 31.5–35.7)
MCV RBC AUTO: 94.7 FL (ref 79–97)
NEUTROPHILS # BLD AUTO: 5.14 10*3/MM3 (ref 1.7–7)
NEUTROPHILS NFR BLD MANUAL: 8.9 % (ref 42.7–76)
PLAT MORPH BLD: NORMAL
PLATELET # BLD AUTO: 211 10*3/MM3 (ref 140–450)
PMV BLD AUTO: 9.3 FL (ref 6–12)
POIKILOCYTOSIS BLD QL SMEAR: ABNORMAL
POTASSIUM SERPL-SCNC: 5.2 MMOL/L (ref 3.5–5.2)
PROT SERPL-MCNC: 7 G/DL (ref 6–8.5)
RBC # BLD AUTO: 5.12 10*6/MM3 (ref 4.14–5.8)
SMUDGE CELLS BLD QL SMEAR: ABNORMAL
SODIUM SERPL-SCNC: 141 MMOL/L (ref 136–145)
VARIANT LYMPHS NFR BLD MANUAL: 17.8 % (ref 0–5)
VARIANT LYMPHS NFR BLD MANUAL: 72.2 % (ref 19.6–45.3)
WBC NRBC COR # BLD: 57.75 10*3/MM3 (ref 3.4–10.8)

## 2021-11-22 PROCEDURE — 80053 COMPREHEN METABOLIC PANEL: CPT

## 2021-11-22 PROCEDURE — 85007 BL SMEAR W/DIFF WBC COUNT: CPT

## 2021-11-22 PROCEDURE — 85025 COMPLETE CBC W/AUTO DIFF WBC: CPT

## 2021-11-22 PROCEDURE — 36415 COLL VENOUS BLD VENIPUNCTURE: CPT

## 2022-02-14 ENCOUNTER — TELEPHONE (OUTPATIENT)
Dept: ONCOLOGY | Facility: CLINIC | Age: 57
End: 2022-02-14

## 2022-02-14 ENCOUNTER — LAB (OUTPATIENT)
Dept: LAB | Facility: HOSPITAL | Age: 57
End: 2022-02-14

## 2022-02-14 DIAGNOSIS — C91.10 CLL (CHRONIC LYMPHOCYTIC LEUKEMIA): Primary | ICD-10-CM

## 2022-02-14 LAB
ALBUMIN SERPL-MCNC: 4.5 G/DL (ref 3.5–5.2)
ALBUMIN/GLOB SERPL: 2 G/DL
ALP SERPL-CCNC: 122 U/L (ref 39–117)
ALT SERPL W P-5'-P-CCNC: 44 U/L (ref 1–41)
ANION GAP SERPL CALCULATED.3IONS-SCNC: 9 MMOL/L (ref 5–15)
ANISOCYTOSIS BLD QL: ABNORMAL
AST SERPL-CCNC: 42 U/L (ref 1–40)
BILIRUB SERPL-MCNC: 1.5 MG/DL (ref 0–1.2)
BUN SERPL-MCNC: 16 MG/DL (ref 6–20)
BUN/CREAT SERPL: 16.7 (ref 7–25)
CALCIUM SPEC-SCNC: 9.7 MG/DL (ref 8.6–10.5)
CHLORIDE SERPL-SCNC: 102 MMOL/L (ref 98–107)
CO2 SERPL-SCNC: 31 MMOL/L (ref 22–29)
CREAT SERPL-MCNC: 0.96 MG/DL (ref 0.76–1.27)
DEPRECATED RDW RBC AUTO: 46.5 FL (ref 37–54)
EOSINOPHIL # BLD MANUAL: 0.58 10*3/MM3 (ref 0–0.4)
EOSINOPHIL NFR BLD MANUAL: 1 % (ref 0.3–6.2)
ERYTHROCYTE [DISTWIDTH] IN BLOOD BY AUTOMATED COUNT: 13.6 % (ref 12.3–15.4)
GFR SERPL CREATININE-BSD FRML MDRD: 80 ML/MIN/1.73
GLOBULIN UR ELPH-MCNC: 2.3 GM/DL
GLUCOSE SERPL-MCNC: 122 MG/DL (ref 65–99)
HCT VFR BLD AUTO: 46.3 % (ref 37.5–51)
HGB BLD-MCNC: 15.3 G/DL (ref 13–17.7)
HOLD SPECIMEN: NORMAL
LYMPHOCYTES # BLD MANUAL: 48.79 10*3/MM3 (ref 0.7–3.1)
MCH RBC QN AUTO: 30.9 PG (ref 26.6–33)
MCHC RBC AUTO-ENTMCNC: 33 G/DL (ref 31.5–35.7)
MCV RBC AUTO: 93.5 FL (ref 79–97)
NEUTROPHILS # BLD AUTO: 8.23 10*3/MM3 (ref 1.7–7)
NEUTROPHILS NFR BLD MANUAL: 14.3 % (ref 42.7–76)
PLAT MORPH BLD: NORMAL
PLATELET # BLD AUTO: 199 10*3/MM3 (ref 140–450)
PMV BLD AUTO: 9.4 FL (ref 6–12)
POIKILOCYTOSIS BLD QL SMEAR: ABNORMAL
POTASSIUM SERPL-SCNC: 4.5 MMOL/L (ref 3.5–5.2)
PROT SERPL-MCNC: 6.8 G/DL (ref 6–8.5)
RBC # BLD AUTO: 4.95 10*6/MM3 (ref 4.14–5.8)
SMUDGE CELLS BLD QL SMEAR: ABNORMAL
SODIUM SERPL-SCNC: 142 MMOL/L (ref 136–145)
VARIANT LYMPHS NFR BLD MANUAL: 1 % (ref 0–5)
VARIANT LYMPHS NFR BLD MANUAL: 83.8 % (ref 19.6–45.3)
WBC NRBC COR # BLD: 57.53 10*3/MM3 (ref 3.4–10.8)

## 2022-02-14 PROCEDURE — 36415 COLL VENOUS BLD VENIPUNCTURE: CPT

## 2022-02-14 PROCEDURE — 85025 COMPLETE CBC W/AUTO DIFF WBC: CPT

## 2022-02-14 PROCEDURE — 80053 COMPREHEN METABOLIC PANEL: CPT

## 2022-02-14 PROCEDURE — 85007 BL SMEAR W/DIFF WBC COUNT: CPT

## 2022-03-07 ENCOUNTER — OFFICE VISIT (OUTPATIENT)
Dept: ONCOLOGY | Facility: CLINIC | Age: 57
End: 2022-03-07

## 2022-03-07 VITALS
BODY MASS INDEX: 29.31 KG/M2 | SYSTOLIC BLOOD PRESSURE: 124 MMHG | RESPIRATION RATE: 18 BRPM | HEART RATE: 87 BPM | DIASTOLIC BLOOD PRESSURE: 80 MMHG | HEIGHT: 72 IN | TEMPERATURE: 98.8 F | WEIGHT: 216.4 LBS | OXYGEN SATURATION: 97 %

## 2022-03-07 DIAGNOSIS — C91.10 CLL (CHRONIC LYMPHOCYTIC LEUKEMIA): Primary | ICD-10-CM

## 2022-03-07 PROCEDURE — 99213 OFFICE O/P EST LOW 20 MIN: CPT | Performed by: INTERNAL MEDICINE

## 2022-09-04 NOTE — PROGRESS NOTES
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  He had never required therapy.  The patient is here alone.  History is obtained from the patient.  History is accurate.         Problem List Items Addressed This Visit    None       Oncology/Hematology History Overview Note   DIAGNOSTIC ABNORMALITIES:  CBC from [facility] 02/05/2013 revealed a WBC of 15.03, hemoglobin 15.6, hematocrit 43.3, MCV 88.4, platelet count 300,000. Absolute lymphocyte count was elevated at 9.7. ANC was 4.7.  PATHOLOGY:  [facility], Fluorescence in-situ Hybridization (FISH ) report, 04/04/2013. 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 Deletion 13q is the most common genetic abnormality in CLL, found in 36-64% of cases. As the sole abnormality, it has a good prognostic value. Both the disease free interval and overall survival are better than in cases with a normal karyotype because of slow disease progression. Monitoring for deletion 13q may be useful in assessing the patient's remission/relapse status. No gene rearrangements characteristic for t(11;14) or abnormalities involving the target regions of chromosomes 6, 11, 12, or 17 were detected. However, it should be noted that the FISH probes utilized in this analysis cannot entirely exclude the presence of other chromosomal abnormalities. Correlation with classic cytogenetics, clinical, flow cytometric, and  morphological data is recommended, if available. 04/10/2013 JF  Peripheral blood, 04/03/2013 ([facility]).  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  Comments:  Flow cytometry identified an abnormal low kappa light chain-restricted B cell population showing coexpression of CD20, CD5, and CD23 without significant expression of CD10, FMC7 or CD79b.  The abnormal B cells are of a predominantly small size and account for approximately 96.2% of the B cells and 37.0% of the total white blood cells.  Roberson-Giemsa stained cytospin preparation shows atypical lymphocytes of a predominantly small to intermediate size.  These findings are indicative of involvement by a mature small B cell neoplasm. The cytomorphologic features and immunophenotype of the neoplastic B cells are consistent with CLL/SLL.  A 1.9% subset of the CD5-positive B cells coexpresses CD38. Of note, in the majority of patients, expression of CD38 on less than 30% of CD5-positive CLL/SLL B cells has been associated with more favorable prognosis. FISH studies for CLL associated genetic abnormalities and IgVH hypermutation analysis by PCR are underway and will be reported separately. Correlation with morphological findings and close clinical followup are recommended.   Abdominal ultrasound done at [facility] on 04/12/2013. IMPRESSION:  1. Mobile gallstone within the lumen of the gallbladder. No gallbladder wall thickening or pericholecystic fluid. No biliary dilatation. Findings suggest cholelithiasis. 2. Echogenic appearance to the liver parenchyma may represent diffuse fatty infiltration. 3. Normal appearance to the kidneys, spleen, and visible portions of the pancreas.   CT of the abdomen and pelvis done on 03/30/2015 at [facility]. Impression: No CT evidence of neoplastic disease. No acute intra-abdominal/pelvic pathological process. Cholelithiasis.  Colonic diverticulosis without CT evidence of acute diverticulitis. Bilateral spondylolysis at L5. Mild spondylolisthesis not excluded.   Ultrasound of the abdomen done on 03/30/2015 at [facility]. Impression: Cholelithiasis. Steatosis of the liver.       PREVIOUS INTERVENTIONS:  Observation.     CLL (chronic lymphocytic leukemia) (HCC)   12/5/2019 Initial Diagnosis    CLL (chronic lymphocytic leukemia) (CMS/HCC)         PAST MEDICAL HISTORY:  ALLERGIES:  No Known Allergies  CURRENT MEDICATIONS:  Outpatient Encounter Medications as of 9/19/2022   Medication Sig Dispense Refill   • lovastatin (MEVACOR) 20 MG tablet Take 40 mg by mouth Every Night.     • NIACIN FLUSH FREE 500 MG capsule TK 1 C PO QD  5   • OMEGA-3 FATTY ACIDS PO Take  by mouth.     • valsartan-hydrochlorothiazide (DIOVAN-HCT) 160-25 MG per tablet TK 1 T PO QD     • Glucosamine-Chondroitin (GLUCOSAMINE CHONDR COMPLEX PO) Take  by mouth Daily.       No facility-administered encounter medications on file as of 9/19/2022.     ADULT ILLNESSES:  Patient Active Problem List   Diagnosis Code   • Family hx colonic polyps Z83.71   • Family hx of colon cancer Z80.0   • Hx of colonic polyp Z86.010   • Cervical lymphadenopathy R59.0   • CLL (chronic lymphocytic leukemia) (HCC) C91.10     SURGERIES:  Past Surgical History:   Procedure Laterality Date   • COLONOSCOPY  02/15/2013   • COLONOSCOPY N/A 3/19/2018    Procedure: COLONOSCOPY WITH ANESTHESIA;  Surgeon: Leonidas Ibanez MD;  Location: [facility] ENDOSCOPY;  Service: Gastroenterology   • EYE SURGERY     • TONSILLECTOMY AND ADENOIDECTOMY       HEALTH MAINTENANCE ITEMS:  Health Maintenance Due   Topic Date Due   • ANNUAL PHYSICAL  Never done   • Pneumococcal Vaccine 0-64 (1 - PCV) Never done   • TDAP/TD VACCINES (1 - Tdap) Never done   • HEPATITIS C SCREENING  Never done   • COVID-19 Vaccine (3 - Pfizer risk series) 10/25/2021       <no information>  Last Completed Colonoscopy          COLORECTAL  "CANCER SCREENING (COLONOSCOPY - Every 10 Years) Next due on 3/19/2028    03/19/2018  Surgical Procedure: COLONOSCOPY    03/19/2018  COLONOSCOPY    03/27/2017  Outside Claim: CHG BLOOD OCCULT,BY PEROXID,FECES,SINGLE, COLORECTAL SCREEN    02/15/2013  SCANNED - COLONOSCOPY    03/18/2010  SCANNED - COLONOSCOPY              Immunization History   Administered Date(s) Administered   • COVID-19 (PFIZER) PURPLE CAP 08/26/2021, 09/27/2021     Last Completed Mammogram     This patient has no relevant Health Maintenance data.            FAMILY HISTORY:  Family History   Problem Relation Age of Onset   • Colon cancer Maternal Aunt    • Colon polyps Maternal Uncle    • Colon cancer Maternal Aunt    • Colon cancer Paternal Grandmother      SOCIAL HISTORY:  Social History     Socioeconomic History   • Marital status:    Tobacco Use   • Smoking status: Never Smoker   • Smokeless tobacco: Never Used   Substance and Sexual Activity   • Alcohol use: No     Comment: rare   • Drug use: No   • Sexual activity: Defer       REVIEW OF SYSTEMS:    Review of Systems   Constitutional: Negative for chills, fatigue and fever.        \"I feel good.\"   Respiratory: Negative for cough, shortness of breath and wheezing.    Cardiovascular: Negative for palpitations.   Gastrointestinal: Negative for abdominal pain, nausea and vomiting.   Genitourinary: Negative for difficulty urinating, flank pain and hematuria.   Skin: Negative for pallor.   Neurological: Negative for facial asymmetry, speech difficulty and confusion.   Psychiatric/Behavioral: Negative for agitation and hallucinations. The patient is not nervous/anxious.          VITAL SIGNS: /72   Pulse 77   Temp 97.5 °F (36.4 °C)   Resp 18   Ht 182.9 cm (72.01\")   Wt 93.2 kg (205 lb 8 oz)   SpO2 98%   BMI 27.86 kg/m²  Body surface area is 2.16 meters squared. Lost 11 pounds, indented.  \"I cut soda.\"  Pain Score    09/19/22 0811   PainSc: 0-No pain           PHYSICAL EXAMINATION: "     Physical Exam  Vitals reviewed.   Constitutional:       General: He is not in acute distress.  Cardiovascular:      Rate and Rhythm: Normal rate.   Pulmonary:      Effort: No respiratory distress.      Breath sounds: No wheezing or rales.   Abdominal:      General: Bowel sounds are normal.      Palpations: Abdomen is soft.   Musculoskeletal:         General: No swelling.   Skin:     General: Skin is warm.      Coloration: Skin is not pale.   Neurological:      Mental Status: He is alert and oriented to person, place, and time.   Psychiatric:         Mood and Affect: Mood normal.         Behavior: Behavior normal.         Thought Content: Thought content normal.         Judgment: Judgment normal.         LABS    Lab Results - Last 18 Months   Lab Units 09/12/22  0704 02/14/22  0659 11/22/21  0704 08/16/21  0706 05/24/21  0719   HEMOGLOBIN g/dL 15.6 15.3 16.0 15.1 15.1   HEMATOCRIT % 46.2 46.3 48.5 43.5 44.3   MCV fL 96.3 93.5 94.7 93.1 92.5   WBC 10*3/mm3 67.42* 57.53* 57.75* 50.14* 47.73*   RDW % 13.6 13.6 13.2 13.6 13.4   MPV fL 10.1 9.4 9.3 9.3 9.5   PLATELETS 10*3/mm3 190 199 211 213 221   NEUTROS ABS 10*3/mm3 7.49* 8.23* 5.14 3.01 4.43   LYMPHS ABS 10*3/mm3  --   --   --   --  39.59*   MONOS ABS 10*3/mm3  --   --   --   --  3.27*   EOS ABS 10*3/mm3  --  0.58* 0.64*  --  0.15   BASOS ABS 10*3/mm3  --   --   --   --  0.19   NEUTROPHIL % % 9.5* 14.3* 8.9* 6.0*  --    MONOCYTES % % 1.6*  --   --  1.0*  --    ATYP LYMPH % %  --  1.0 17.8*  --   --    ANISOCYTOSIS   --  Slight/1+  --   --   --        Lab Results - Last 18 Months   Lab Units 09/13/22  0701 09/12/22  0924 02/14/22  0659 11/22/21  0704 08/16/21  0706   GLUCOSE mg/dL 110* 123* 122* 118* 137*   SODIUM mmol/L 136 139 142 141 142   POTASSIUM mmol/L 5.0 6.0* 4.5 5.2 4.9   CO2 mmol/L 30.0* 30.0* 31.0* 27.0 27.0   CHLORIDE mmol/L 101 102 102 105 108*   ANION GAP mmol/L 5.0 7.0 9.0 9.0 7.0   CREATININE mg/dL 1.16 1.10 0.96 0.90 0.82   BUN mg/dL 21* 26* 16 18  "18   BUN / CREAT RATIO  18.1 23.6 16.7 20.0 22.0   CALCIUM mg/dL 9.6 9.9 9.7 9.5 9.9   EGFR IF NONAFRICN AM mL/min/1.73  --   --  80 87 96   ALK PHOS U/L  --  123* 122* 117 127*   TOTAL PROTEIN g/dL  --  7.3 6.8 7.0 7.0   ALT (SGPT) U/L  --  26 44* 39 36   AST (SGOT) U/L  --  34 42* 47* 33   BILIRUBIN mg/dL  --  1.6* 1.5* 1.5* 1.9*   ALBUMIN g/dL  --  4.90 4.50 4.70 4.80   GLOBULIN gm/dL  --  2.4 2.3 2.3 2.2       No results for input(s): MSPIKE, KAPPALAMB, IGLFLC, URICACID, FREEKAPPAL, CEA, LDH, REFLABREPO in the last 50637 hours.    No results for input(s): IRON, TIBC, LABIRON, FERRITIN, N4MYUZH, TSH, FOLATE in the last 43069 hours.    Invalid input(s): VITB12      Rico Enrique reports a pain score of 0.       ASSESSMENT:  1.   Chronic lymphocytic leukemia/small lymphocytic lymphoma (CLL/SLL) monoclonal kappa.  Stage 0.  Asymptomatic.  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  Complications: None.  Treatment status: Observation.  Prognosis: Good.  2.   Good performance status of 0.  3.   Elevated liver function tests from statin, cholelithiasis, and fatty liver.  4.   A 1.5 cm right level II node, followed by Dr. Hernandez.    5.   Elevated PSA. Followed by urology.  6.   History of hyperkalemia.        PLAN:  1.    Re:   Heme status.  WBC 67.4 from 57.5 from 57.7, hemoglobin 15.6, platelets 190 and lymphocytes 58.8 from 48.7 from 51.9 from 46.6.  No rapid doubling.  No cytopenias.  2.    Re:  Pre-office CMP.   K was 6, ALT 26 from 44 from 39, AST 34 from  42 from 47, and bilirubin 1.6 from 1.5 from 1.5 from 1.9 positive for gallstones.  \"I ate a lot of cantaloupe and drinking Gatorade.\"  Repeat K was 5.  3.    Re: No interval laboratory.  4.    Re:  Indiications for CLL therapy.  B symptoms, rapid doubling of lymphocytes, recurrent infections, or symptomatic adenopathies.  5.    Re:  Stable for observation.    6.   CBC with differential every 3 months.  7.   Plan of care " discussed with patient.  Understanding expressed.  Patient agreeable to proceed.  8.   Continue ongoing management per primary care physician and other specialists.  9.   Return to office in 6 months with pre-office CMP and CBC and differential.       I have reviewed the assessment and plan and verified the accuracy of it. No changes to assessment and plan since the information was documented. Stanley San MD 09/19/22       I spent 22 total minutes, face-to-face, caring for Rico morgan.  Greater than 50% of this time involved counseling and/or coordination of care as documented within this note regarding the patient's illness(es), pros and cons of various treatment options, instructions and/or risk reduction.           cc:   Konstantin Fleming MD          (Maggie Thompson MD)          (Fredy Waldrop MD)          (Yang Hernandez MD)          (Leonidas Ibanez MD)

## 2022-09-12 ENCOUNTER — LAB (OUTPATIENT)
Dept: LAB | Facility: HOSPITAL | Age: 57
End: 2022-09-12

## 2022-09-12 ENCOUNTER — TELEPHONE (OUTPATIENT)
Dept: ONCOLOGY | Facility: CLINIC | Age: 57
End: 2022-09-12

## 2022-09-12 DIAGNOSIS — E87.5 SERUM POTASSIUM ELEVATED: Primary | ICD-10-CM

## 2022-09-12 DIAGNOSIS — C91.10 CLL (CHRONIC LYMPHOCYTIC LEUKEMIA): ICD-10-CM

## 2022-09-12 LAB
ALBUMIN SERPL-MCNC: 4.9 G/DL (ref 3.5–5.2)
ALBUMIN/GLOB SERPL: 2 G/DL
ALP SERPL-CCNC: 123 U/L (ref 39–117)
ALT SERPL W P-5'-P-CCNC: 26 U/L (ref 1–41)
ANION GAP SERPL CALCULATED.3IONS-SCNC: 7 MMOL/L (ref 5–15)
AST SERPL-CCNC: 34 U/L (ref 1–40)
BILIRUB SERPL-MCNC: 1.6 MG/DL (ref 0–1.2)
BUN SERPL-MCNC: 26 MG/DL (ref 6–20)
BUN/CREAT SERPL: 23.6 (ref 7–25)
CALCIUM SPEC-SCNC: 9.9 MG/DL (ref 8.6–10.5)
CHLORIDE SERPL-SCNC: 102 MMOL/L (ref 98–107)
CO2 SERPL-SCNC: 30 MMOL/L (ref 22–29)
CREAT SERPL-MCNC: 1.1 MG/DL (ref 0.76–1.27)
DEPRECATED RDW RBC AUTO: 48.4 FL (ref 37–54)
EGFRCR SERPLBLD CKD-EPI 2021: 77.3 ML/MIN/1.73
ERYTHROCYTE [DISTWIDTH] IN BLOOD BY AUTOMATED COUNT: 13.6 % (ref 12.3–15.4)
GLOBULIN UR ELPH-MCNC: 2.4 GM/DL
GLUCOSE SERPL-MCNC: 123 MG/DL (ref 65–99)
HCT VFR BLD AUTO: 46.2 % (ref 37.5–51)
HGB BLD-MCNC: 15.6 G/DL (ref 13–17.7)
LYMPHOCYTES # BLD MANUAL: 58.86 10*3/MM3 (ref 0.7–3.1)
LYMPHOCYTES NFR BLD MANUAL: 1.6 % (ref 5–12)
MCH RBC QN AUTO: 32.5 PG (ref 26.6–33)
MCHC RBC AUTO-ENTMCNC: 33.8 G/DL (ref 31.5–35.7)
MCV RBC AUTO: 96.3 FL (ref 79–97)
MONOCYTES # BLD: 1.08 10*3/MM3 (ref 0.1–0.9)
NEUTROPHILS # BLD AUTO: 7.49 10*3/MM3 (ref 1.7–7)
NEUTROPHILS NFR BLD MANUAL: 9.5 % (ref 42.7–76)
NEUTS BAND NFR BLD MANUAL: 1.6 % (ref 0–5)
PLAT MORPH BLD: NORMAL
PLATELET # BLD AUTO: 190 10*3/MM3 (ref 140–450)
PMV BLD AUTO: 10.1 FL (ref 6–12)
POTASSIUM SERPL-SCNC: 6 MMOL/L (ref 3.5–5.2)
PROT SERPL-MCNC: 7.3 G/DL (ref 6–8.5)
RBC # BLD AUTO: 4.8 10*6/MM3 (ref 4.14–5.8)
RBC MORPH BLD: NORMAL
SMUDGE CELLS BLD QL SMEAR: ABNORMAL
SODIUM SERPL-SCNC: 139 MMOL/L (ref 136–145)
VARIANT LYMPHS NFR BLD MANUAL: 87.3 % (ref 19.6–45.3)
WBC NRBC COR # BLD: 67.42 10*3/MM3 (ref 3.4–10.8)

## 2022-09-12 PROCEDURE — 36415 COLL VENOUS BLD VENIPUNCTURE: CPT

## 2022-09-12 PROCEDURE — 80053 COMPREHEN METABOLIC PANEL: CPT

## 2022-09-12 PROCEDURE — 85007 BL SMEAR W/DIFF WBC COUNT: CPT

## 2022-09-12 PROCEDURE — 85025 COMPLETE CBC W/AUTO DIFF WBC: CPT

## 2022-09-12 NOTE — TELEPHONE ENCOUNTER
Called and spoke with patient Rico Enrique, he was informed that his potassium level is elevated @ 6 and the range s/b 3.5-5-2 to 5.3. his specimen has hemolyzed and will need to be recollected to get a accurate reading. patient states that he was called earlier this am by someone and he did return to the Center for repeat potassium recheck and this must be the new reading.   Called and spoke with [facility]. He does confirm patient was called and he did return for a second blood draw regarding his hemolyzed specimen.   Questioned Jesus why the specimen was hemolyzed? Is there something going on with patient blood or is this human error? He reports that Johnna  noted that patient blood specimen was very lipemic. Patient states that he has been eating a diet very high in fat content. Patient will watch his intake to today and return tomorrow.     Explained to patient that his blood will need to be redrawn again for a 3rd time but to return to the Center tomorrow 9/13/22 he is currently on his way to [place], [place] for deliveries, he reports that he feels fine denies any issues such as heart palpitations, sob, dizziness, etc.   Order placed for repeat lab draw 9/13/22

## 2022-09-12 NOTE — TELEPHONE ENCOUNTER
----- Message from Stanley San MD sent at 9/12/2022 10:25 AM CDT -----  Potassium 6.  Call patient to repeat serum potassium today.  Specimen hemolyzed.  Stable bilirubin.

## 2022-09-13 ENCOUNTER — LAB (OUTPATIENT)
Dept: LAB | Facility: HOSPITAL | Age: 57
End: 2022-09-13

## 2022-09-13 DIAGNOSIS — E87.5 SERUM POTASSIUM ELEVATED: ICD-10-CM

## 2022-09-13 LAB
ANION GAP SERPL CALCULATED.3IONS-SCNC: 5 MMOL/L (ref 5–15)
BUN SERPL-MCNC: 21 MG/DL (ref 6–20)
BUN/CREAT SERPL: 18.1 (ref 7–25)
CALCIUM SPEC-SCNC: 9.6 MG/DL (ref 8.6–10.5)
CHLORIDE SERPL-SCNC: 101 MMOL/L (ref 98–107)
CO2 SERPL-SCNC: 30 MMOL/L (ref 22–29)
CREAT SERPL-MCNC: 1.16 MG/DL (ref 0.76–1.27)
EGFRCR SERPLBLD CKD-EPI 2021: 72.6 ML/MIN/1.73
GLUCOSE SERPL-MCNC: 110 MG/DL (ref 65–99)
POTASSIUM SERPL-SCNC: 5 MMOL/L (ref 3.5–5.2)
SODIUM SERPL-SCNC: 136 MMOL/L (ref 136–145)

## 2022-09-13 PROCEDURE — 80048 BASIC METABOLIC PNL TOTAL CA: CPT

## 2022-09-19 ENCOUNTER — OFFICE VISIT (OUTPATIENT)
Dept: ONCOLOGY | Facility: CLINIC | Age: 57
End: 2022-09-19

## 2022-09-19 VITALS
DIASTOLIC BLOOD PRESSURE: 72 MMHG | WEIGHT: 205.5 LBS | HEART RATE: 77 BPM | RESPIRATION RATE: 18 BRPM | TEMPERATURE: 97.5 F | HEIGHT: 72 IN | BODY MASS INDEX: 27.83 KG/M2 | OXYGEN SATURATION: 98 % | SYSTOLIC BLOOD PRESSURE: 128 MMHG

## 2022-09-19 DIAGNOSIS — C91.10 CLL (CHRONIC LYMPHOCYTIC LEUKEMIA): Primary | ICD-10-CM

## 2022-09-19 PROCEDURE — 99213 OFFICE O/P EST LOW 20 MIN: CPT | Performed by: INTERNAL MEDICINE

## 2022-12-02 ENCOUNTER — TELEPHONE (OUTPATIENT)
Dept: GASTROENTEROLOGY | Facility: CLINIC | Age: 57
End: 2022-12-02

## 2022-12-02 NOTE — TELEPHONE ENCOUNTER
Pts wife states pt is having lower abd pain and constipation. I explained pt needs to go to the ER today for evaluation as he has a history of diverticulitis. She states she is going to try to get him in with Alex Fleming or bring him to Dr Simon is he can't get in with Alex. I explained he needs to be seen today regardless of who sees him. She does not want to use the ER unless absolutely necessary due to cost.

## 2022-12-05 NOTE — PROGRESS NOTES
[facility]    Primary Physician Lonny Simon MD    12/6/2022    Rico Enrique   1965      Chief Complaint   Patient presents with   • Abdominal Pain   • Constipation       Subjective     HPI    Rico Enrique is a 57 y.o. male who presents with abdominal pain that started 3 days after Thanksgiving. The pain was lower mid abdomen. He has had similar episodes in the past with diverticulitis. Had associated constipation. Felt feverish. He contacted pcp and was given rx for augmentin. He has had 4 days of augmentin so far. The course if for 10 days. The pain has resolved. The constipation is a little better. No rectal bleeding.  No weight loss.          COLONOSCOPY (03/19/2018 08:56)  Recall 10 years   He has history of colon polyps.   Maternal aunt had colon cancer.         Past Medical History:   Diagnosis Date   • Abnormal liver enzymes    • Cholelithiasis    • Colon polyp    • Diverticulosis    • Enlarged prostate     dr rivera   • Hemorrhoids    • Hypertension    • Leukemia (HCC) 2013    chronic lymphocytic, dr al    • Lymphoma (HCC) 2013    small lymphoma, dr al    • PONV (postoperative nausea and vomiting)        Past Surgical History:   Procedure Laterality Date   • COLONOSCOPY  02/15/2013   • COLONOSCOPY N/A 3/19/2018    Procedure: COLONOSCOPY WITH ANESTHESIA;  Surgeon: Leonidas Ibanez MD;  Location: [facility] ENDOSCOPY;  Service: Gastroenterology   • EYE SURGERY     • TONSILLECTOMY AND ADENOIDECTOMY         Outpatient Medications Marked as Taking for the 12/6/22 encounter (Office Visit) with Renea Henderson APRN   Medication Sig Dispense Refill   • amoxicillin-clavulanate (AUGMENTIN) 875-125 MG per tablet      • lovastatin (MEVACOR) 20 MG tablet Take 40 mg by mouth Every Night.     • NIACIN FLUSH FREE 500 MG capsule TK 1 C PO QD  5   • OMEGA-3 FATTY ACIDS PO Take  by mouth.     • valsartan-hydrochlorothiazide (DIOVAN-HCT) 160-25 MG per tablet TK 1 T PO QD          Allergies   Allergen Reactions   • Flagyl [Metronidazole] Other (See Comments)     thrush         Social History     Socioeconomic History   • Marital status:    Tobacco Use   • Smoking status: Never   • Smokeless tobacco: Never   Substance and Sexual Activity   • Alcohol use: Yes     Comment: rare   • Drug use: No   • Sexual activity: Defer       Family History   Problem Relation Age of Onset   • Colon cancer Maternal Aunt    • Colon cancer Maternal Aunt    • Colon polyps Maternal Uncle    • Colon cancer Paternal Grandmother        Review of Systems   Constitutional: Negative for chills, fever and unexpected weight change.   Respiratory: Negative for shortness of breath.    Cardiovascular: Negative for chest pain.   Gastrointestinal: Positive for abdominal pain and constipation. Negative for abdominal distention, anal bleeding, blood in stool, diarrhea, nausea and vomiting.       Objective     Vitals:    12/06/22 0806   BP: 120/82   Pulse: 87   Temp: 96 °F (35.6 °C)   SpO2: 97%         12/06/22  0806   Weight: 95.7 kg (211 lb)     Body mass index is 29.43 kg/m².    Physical Exam  Vitals reviewed.   Constitutional:       General: He is not in acute distress.  Cardiovascular:      Rate and Rhythm: Normal rate and regular rhythm.      Heart sounds: Normal heart sounds.   Pulmonary:      Effort: Pulmonary effort is normal.      Breath sounds: Normal breath sounds.   Abdominal:      General: Bowel sounds are normal. There is no distension.      Palpations: Abdomen is soft.      Tenderness: There is no abdominal tenderness.   Skin:     General: Skin is warm and dry.   Neurological:      Mental Status: He is alert.         Imaging Results (Most Recent)     None          Assessment & Plan     Diagnoses and all orders for this visit:    1. Diverticulitis (Primary)  Comments:  clinically diagnosed   Orders:  -     CBC (No Diff)    2. Hx of colonic polyp    3. Family hx of colon cancer      Abdominal pain felt to  be secondary to diverticulitis.  Abdominal pain has resolved after starting on antibiotics.  No imaging study.  I recommend that he continue and finish Augmentin.  We will check a CBC.  Recommend low roughage diet.  We will see him back in 3 weeks.  I recommend ER if has worsening or severe pain.          Last colonoscopy was 2018.  We will discuss repeat colonoscopy at next office visit.  Repeat colonoscopy would need to be at least 6 weeks after finishing antibiotics.                Renea Henderson APRN

## 2022-12-06 ENCOUNTER — OFFICE VISIT (OUTPATIENT)
Dept: GASTROENTEROLOGY | Facility: CLINIC | Age: 57
End: 2022-12-06

## 2022-12-06 ENCOUNTER — LAB (OUTPATIENT)
Dept: LAB | Facility: HOSPITAL | Age: 57
End: 2022-12-06

## 2022-12-06 ENCOUNTER — TELEPHONE (OUTPATIENT)
Dept: GASTROENTEROLOGY | Facility: CLINIC | Age: 57
End: 2022-12-06

## 2022-12-06 VITALS
TEMPERATURE: 96 F | HEIGHT: 71 IN | BODY MASS INDEX: 29.54 KG/M2 | WEIGHT: 211 LBS | HEART RATE: 87 BPM | DIASTOLIC BLOOD PRESSURE: 82 MMHG | SYSTOLIC BLOOD PRESSURE: 120 MMHG | OXYGEN SATURATION: 97 %

## 2022-12-06 DIAGNOSIS — K57.92 DIVERTICULITIS: Primary | ICD-10-CM

## 2022-12-06 DIAGNOSIS — Z86.010 HX OF COLONIC POLYP: ICD-10-CM

## 2022-12-06 DIAGNOSIS — Z80.0 FAMILY HX OF COLON CANCER: ICD-10-CM

## 2022-12-06 LAB
DEPRECATED RDW RBC AUTO: 48.8 FL (ref 37–54)
ERYTHROCYTE [DISTWIDTH] IN BLOOD BY AUTOMATED COUNT: 13.5 % (ref 12.3–15.4)
HCT VFR BLD AUTO: 43.9 % (ref 37.5–51)
HGB BLD-MCNC: 13.8 G/DL (ref 13–17.7)
MCH RBC QN AUTO: 30.7 PG (ref 26.6–33)
MCHC RBC AUTO-ENTMCNC: 31.4 G/DL (ref 31.5–35.7)
MCV RBC AUTO: 97.6 FL (ref 79–97)
PLATELET # BLD AUTO: 344 10*3/MM3 (ref 140–450)
PMV BLD AUTO: 8.7 FL (ref 6–12)
RBC # BLD AUTO: 4.5 10*6/MM3 (ref 4.14–5.8)
WBC NRBC COR # BLD: 60.82 10*3/MM3 (ref 3.4–10.8)

## 2022-12-06 PROCEDURE — 85027 COMPLETE CBC AUTOMATED: CPT | Performed by: NURSE PRACTITIONER

## 2022-12-06 PROCEDURE — 99204 OFFICE O/P NEW MOD 45 MIN: CPT | Performed by: NURSE PRACTITIONER

## 2022-12-06 PROCEDURE — 36415 COLL VENOUS BLD VENIPUNCTURE: CPT | Performed by: NURSE PRACTITIONER

## 2022-12-06 RX ORDER — AMOXICILLIN AND CLAVULANATE POTASSIUM 875; 125 MG/1; MG/1
TABLET, FILM COATED ORAL
COMMUNITY
Start: 2022-12-02 | End: 2022-12-21

## 2022-12-06 NOTE — TELEPHONE ENCOUNTER
I called his home phone and spoke with his wife.  I did give her the results of his white blood cell count.  I will forward results to Dr. San.  His white blood cell count is chronically elevated.

## 2022-12-12 ENCOUNTER — TELEPHONE (OUTPATIENT)
Dept: GASTROENTEROLOGY | Facility: CLINIC | Age: 57
End: 2022-12-12

## 2022-12-12 DIAGNOSIS — R10.32 LLQ ABDOMINAL PAIN: Primary | ICD-10-CM

## 2022-12-12 NOTE — TELEPHONE ENCOUNTER
Pt states his left side abd pain seems to be getting worse. He has one more day of antibiotics and states he has constipation. He wants to schedule his colonoscopy and I explained if the pain is worse he may not be clearing the diverticulitis. He also states he usually takes mag cit when he is constipated and now it is not available is there something else he can take. He did take milk of mag this weekend and had a very small BM this morning.   No lymphadedenopathy

## 2022-12-19 ENCOUNTER — LAB (OUTPATIENT)
Dept: LAB | Facility: HOSPITAL | Age: 57
End: 2022-12-19

## 2022-12-19 DIAGNOSIS — C91.10 CLL (CHRONIC LYMPHOCYTIC LEUKEMIA): ICD-10-CM

## 2022-12-19 LAB
BURR CELLS BLD QL SMEAR: ABNORMAL
DEPRECATED RDW RBC AUTO: 47.7 FL (ref 37–54)
EOSINOPHIL # BLD MANUAL: 0.65 10*3/MM3 (ref 0–0.4)
EOSINOPHIL NFR BLD MANUAL: 0.9 % (ref 0.3–6.2)
ERYTHROCYTE [DISTWIDTH] IN BLOOD BY AUTOMATED COUNT: 13.3 % (ref 12.3–15.4)
HCT VFR BLD AUTO: 42.7 % (ref 37.5–51)
HGB BLD-MCNC: 13.2 G/DL (ref 13–17.7)
LYMPHOCYTES # BLD MANUAL: 66.71 10*3/MM3 (ref 0.7–3.1)
MCH RBC QN AUTO: 30.3 PG (ref 26.6–33)
MCHC RBC AUTO-ENTMCNC: 30.9 G/DL (ref 31.5–35.7)
MCV RBC AUTO: 97.9 FL (ref 79–97)
NEUTROPHILS # BLD AUTO: 5.14 10*3/MM3 (ref 1.7–7)
NEUTROPHILS NFR BLD MANUAL: 7.1 % (ref 42.7–76)
PLAT MORPH BLD: NORMAL
PLATELET # BLD AUTO: 288 10*3/MM3 (ref 140–450)
PMV BLD AUTO: 9.2 FL (ref 6–12)
POIKILOCYTOSIS BLD QL SMEAR: ABNORMAL
RBC # BLD AUTO: 4.36 10*6/MM3 (ref 4.14–5.8)
SMUDGE CELLS BLD QL SMEAR: ABNORMAL
VARIANT LYMPHS NFR BLD MANUAL: 0.9 % (ref 0–5)
VARIANT LYMPHS NFR BLD MANUAL: 91.2 % (ref 19.6–45.3)
WBC NRBC COR # BLD: 72.43 10*3/MM3 (ref 3.4–10.8)

## 2022-12-19 PROCEDURE — 85025 COMPLETE CBC W/AUTO DIFF WBC: CPT

## 2022-12-19 PROCEDURE — 36415 COLL VENOUS BLD VENIPUNCTURE: CPT

## 2022-12-19 PROCEDURE — 85007 BL SMEAR W/DIFF WBC COUNT: CPT

## 2022-12-21 ENCOUNTER — TELEPHONE (OUTPATIENT)
Dept: GASTROENTEROLOGY | Facility: CLINIC | Age: 57
End: 2022-12-21

## 2022-12-21 RX ORDER — AMOXICILLIN AND CLAVULANATE POTASSIUM 875; 125 MG/1; MG/1
1 TABLET, FILM COATED ORAL 2 TIMES DAILY
Qty: 20 TABLET | Refills: 0 | Status: SHIPPED | OUTPATIENT
Start: 2022-12-21 | End: 2022-12-31

## 2022-12-21 NOTE — TELEPHONE ENCOUNTER
I did call and speak with the patient.  I went over his CT scan of the abdomen and pelvis report.  This was done today noted mild acute diverticulitis of the sigmoid colon.  He did finish Augmentin recently.  The abdominal pain has improved but not completely resolved.  I recommend another round of Augmentin for 10 days and he is agreeable.  He has no fever or severe pain.  He is going to use some stool softeners because he has had a bowel movement in 2 days.  I recommend ER if has severe symptoms.  He has a follow-up office visit on January 16.

## 2022-12-28 DIAGNOSIS — R10.32 LLQ ABDOMINAL PAIN: ICD-10-CM

## 2023-01-16 ENCOUNTER — OFFICE VISIT (OUTPATIENT)
Dept: GASTROENTEROLOGY | Facility: CLINIC | Age: 58
End: 2023-01-16
Payer: COMMERCIAL

## 2023-01-16 VITALS
WEIGHT: 205 LBS | SYSTOLIC BLOOD PRESSURE: 114 MMHG | DIASTOLIC BLOOD PRESSURE: 82 MMHG | BODY MASS INDEX: 28.7 KG/M2 | OXYGEN SATURATION: 100 % | HEIGHT: 71 IN | HEART RATE: 85 BPM | TEMPERATURE: 95.9 F

## 2023-01-16 DIAGNOSIS — K57.92 DIVERTICULITIS: Primary | ICD-10-CM

## 2023-01-16 DIAGNOSIS — Z86.010 HX OF COLONIC POLYP: ICD-10-CM

## 2023-01-16 DIAGNOSIS — Z80.0 FAMILY HX OF COLON CANCER: ICD-10-CM

## 2023-01-16 PROCEDURE — 99213 OFFICE O/P EST LOW 20 MIN: CPT | Performed by: NURSE PRACTITIONER

## 2023-01-16 RX ORDER — POLYETHYLENE GLYCOL 3350, SODIUM SULFATE ANHYDROUS, SODIUM BICARBONATE, SODIUM CHLORIDE, POTASSIUM CHLORIDE 236; 22.74; 6.74; 5.86; 2.97 G/4L; G/4L; G/4L; G/4L; G/4L
4 POWDER, FOR SOLUTION ORAL ONCE
Qty: 4000 ML | Refills: 0 | Status: SHIPPED | OUTPATIENT
Start: 2023-01-16 | End: 2023-01-16

## 2023-01-16 NOTE — PROGRESS NOTES
[facility]    1/16/2023    Rico Enrique   1965    Primary Physician: Lonny Simon MD    Chief Complaint   Patient presents with   • Diverticulitis         HISTORY OF PRESENT ILLNESS    Rico Enrique is a 57 y.o. male presents for f/u diverticulitis.  He finished second dose of augmentin about 1 week ago. He is feeling much better. Bowels are moving good.  No abdominal pain. No fever.             CT Abdomen Pelvis With Contrast (12/21/2022 00:00)            =======================================================================  Ov  12-6-22 HPI Rico Enrique is a 57 y.o. male who presents with abdominal pain that started 3 days after Thanksgiving. The pain was lower mid abdomen. He has had similar episodes in the past with diverticulitis. Had associated constipation. Felt feverish. He contacted pcp and was given rx for augmentin. He has had 4 days of augmentin so far. The course if for 10 days. The pain has resolved. The constipation is a little better. No rectal bleeding.  No weight loss.              COLONOSCOPY (03/19/2018 08:56)  Recall 10 years   He has history of colon polyps.   Maternal aunt x 2 had colon cancer.        Past Medical History:   Diagnosis Date   • Abnormal liver enzymes    • Cholelithiasis    • Colon polyp    • Diverticulosis    • Enlarged prostate     dr rivera   • Hemorrhoids    • Hypertension    • Leukemia (HCC) 2013    chronic lymphocytic, dr al    • Lymphoma (HCC) 2013    small lymphoma, dr al    • PONV (postoperative nausea and vomiting)        Past Surgical History:   Procedure Laterality Date   • COLONOSCOPY  02/15/2013   • COLONOSCOPY N/A 3/19/2018    Procedure: COLONOSCOPY WITH ANESTHESIA;  Surgeon: Leonidas Ibanez MD;  Location: [facility] ENDOSCOPY;  Service: Gastroenterology   • EYE SURGERY     • TONSILLECTOMY AND ADENOIDECTOMY         Outpatient Medications Marked as Taking for the 1/16/23 encounter (Office Visit) with Renea Henderson,  "APRN   Medication Sig Dispense Refill   • lovastatin (MEVACOR) 20 MG tablet Take 40 mg by mouth Every Night.     • NIACIN FLUSH FREE 500 MG capsule TK 1 C PO QD  5   • OMEGA-3 FATTY ACIDS PO Take  by mouth.     • valsartan-hydrochlorothiazide (DIOVAN-HCT) 160-25 MG per tablet TK 1 T PO QD         Allergies   Allergen Reactions   • Flagyl [Metronidazole] Other (See Comments)     thrush         Social History     Socioeconomic History   • Marital status:    Tobacco Use   • Smoking status: Never   • Smokeless tobacco: Never   Substance and Sexual Activity   • Alcohol use: Yes     Comment: rare   • Drug use: No   • Sexual activity: Defer       Family History   Problem Relation Age of Onset   • Colon cancer Maternal Aunt    • Colon cancer Maternal Aunt    • Colon polyps Maternal Uncle    • Colon cancer Paternal Grandmother        Review of Systems   Constitutional: Negative for chills, fever and unexpected weight change.   Respiratory: Negative for shortness of breath.    Cardiovascular: Negative for chest pain.   Gastrointestinal: Negative for abdominal distention, abdominal pain, anal bleeding, blood in stool, constipation, diarrhea, nausea and vomiting.        Vitals:    01/16/23 0812   BP: 114/82   Pulse: 85   Temp: 95.9 °F (35.5 °C)   SpO2: 100%   Weight: 93 kg (205 lb)   Height: 180.3 cm (71\")      Body mass index is 28.59 kg/m².    Physical Exam  Vitals reviewed.   Constitutional:       General: He is not in acute distress.  Cardiovascular:      Rate and Rhythm: Normal rate and regular rhythm.      Heart sounds: Normal heart sounds.   Pulmonary:      Effort: Pulmonary effort is normal.      Breath sounds: Normal breath sounds.   Abdominal:      General: Bowel sounds are normal. There is no distension.      Palpations: Abdomen is soft.      Tenderness: There is no abdominal tenderness.   Skin:     General: Skin is warm and dry.   Neurological:      Mental Status: He is alert.         Results for orders " placed or performed in visit on 12/19/22   CBC Auto Differential    Specimen: Blood   Result Value Ref Range    WBC 72.43 (C) 3.40 - 10.80 10*3/mm3    RBC 4.36 4.14 - 5.80 10*6/mm3    Hemoglobin 13.2 13.0 - 17.7 g/dL    Hematocrit 42.7 37.5 - 51.0 %    MCV 97.9 (H) 79.0 - 97.0 fL    MCH 30.3 26.6 - 33.0 pg    MCHC 30.9 (L) 31.5 - 35.7 g/dL    RDW 13.3 12.3 - 15.4 %    RDW-SD 47.7 37.0 - 54.0 fl    MPV 9.2 6.0 - 12.0 fL    Platelets 288 140 - 450 10*3/mm3   Manual Differential    Specimen: Blood   Result Value Ref Range    Neutrophil % 7.1 (L) 42.7 - 76.0 %    Lymphocyte % 91.2 (H) 19.6 - 45.3 %    Eosinophil % 0.9 0.3 - 6.2 %    Atypical Lymphocyte % 0.9 0.0 - 5.0 %    Neutrophils Absolute 5.14 1.70 - 7.00 10*3/mm3    Lymphocytes Absolute 66.71 (H) 0.70 - 3.10 10*3/mm3    Eosinophils Absolute 0.65 (H) 0.00 - 0.40 10*3/mm3    Crenated RBC's Slight/1+ None Seen    Poikilocytes Slight/1+ None Seen    Smudge Cells Slight/1+ None Seen    Platelet Morphology Normal Normal           ASSESSMENT AND PLAN    Assessment & Plan     Diagnoses and all orders for this visit:    1. Diverticulitis (Primary)  Comments:  resolved.   Orders:  -     Case Request; Standing  -     Case Request    2. Hx of colonic polyp  -     Case Request; Standing  -     Case Request    3. Family hx of colon cancer  -     Case Request; Standing  -     Case Request    Other orders  -     Implement Anesthesia Orders Day of Procedure; Standing  -     Obtain Informed Consent; Standing  -     polyethylene glycol (Golytely) 236 g solution; Take 4,000 mL by mouth 1 (One) Time for 1 dose. Take as directed per instruction sheet.  Dispense: 4000 mL; Refill: 0        No further abdominal pain. I recommend colonoscopy in 6 weeks to allow time to heal. We did discuss the slim risk of colon cancer as cause of symptoms and he verbalized understanding. He is to call our office if recurrence of symptoms.           COLONOSCOPY WITH ANESTHESIA (N/A)  All risks, benefits,  alternatives, and indications of colonoscopy procedure have been discussed with the patient. Risks to include perforation of the colon requiring possible surgery or colostomy, risk of bleeding from biopsies or removal of colon tissue, possibility of missing a colon polyp or cancer, or adverse drug reaction.  Benefits to include the diagnosis and management of disease of the colon and rectum. Alternatives to include barium enema, radiographic evaluation, lab testing or no intervention. Pt verbalizes understanding and agrees.              No follow-ups on file.          There are no Patient Instructions on file for this visit.      Renea Henderson, APRN

## 2023-02-27 ENCOUNTER — ANESTHESIA EVENT (OUTPATIENT)
Dept: GASTROENTEROLOGY | Facility: HOSPITAL | Age: 58
End: 2023-02-27
Payer: COMMERCIAL

## 2023-02-27 ENCOUNTER — HOSPITAL ENCOUNTER (OUTPATIENT)
Facility: HOSPITAL | Age: 58
Setting detail: HOSPITAL OUTPATIENT SURGERY
Discharge: HOME | End: 2023-02-27
Attending: INTERNAL MEDICINE | Admitting: INTERNAL MEDICINE
Payer: COMMERCIAL

## 2023-02-27 ENCOUNTER — ANESTHESIA (OUTPATIENT)
Dept: GASTROENTEROLOGY | Facility: HOSPITAL | Age: 58
End: 2023-02-27
Payer: COMMERCIAL

## 2023-02-27 VITALS
OXYGEN SATURATION: 97 % | SYSTOLIC BLOOD PRESSURE: 117 MMHG | HEART RATE: 61 BPM | TEMPERATURE: 98.2 F | RESPIRATION RATE: 14 BRPM | DIASTOLIC BLOOD PRESSURE: 84 MMHG | WEIGHT: 200 LBS | BODY MASS INDEX: 28 KG/M2 | HEIGHT: 71 IN

## 2023-02-27 PROCEDURE — 25010000002 PROPOFOL 1000 MG/100ML EMULSION: Performed by: NURSE ANESTHETIST, CERTIFIED REGISTERED

## 2023-02-27 PROCEDURE — 45378 DIAGNOSTIC COLONOSCOPY: CPT | Performed by: INTERNAL MEDICINE

## 2023-02-27 RX ORDER — SODIUM CHLORIDE 0.9 % (FLUSH) 0.9 %
10 SYRINGE (ML) INJECTION AS NEEDED
Status: DISCONTINUED | OUTPATIENT
Start: 2023-02-27 | End: 2023-02-27 | Stop reason: HOSPADM

## 2023-02-27 RX ORDER — ONDANSETRON 2 MG/ML
4 INJECTION INTRAMUSCULAR; INTRAVENOUS ONCE AS NEEDED
Status: DISCONTINUED | OUTPATIENT
Start: 2023-02-27 | End: 2023-02-27 | Stop reason: HOSPADM

## 2023-02-27 RX ORDER — PROPOFOL 10 MG/ML
INJECTION, EMULSION INTRAVENOUS AS NEEDED
Status: DISCONTINUED | OUTPATIENT
Start: 2023-02-27 | End: 2023-02-27 | Stop reason: SURG

## 2023-02-27 RX ORDER — SODIUM CHLORIDE 9 MG/ML
500 INJECTION, SOLUTION INTRAVENOUS CONTINUOUS PRN
Status: DISCONTINUED | OUTPATIENT
Start: 2023-02-27 | End: 2023-02-27 | Stop reason: HOSPADM

## 2023-02-27 RX ORDER — LIDOCAINE HYDROCHLORIDE 20 MG/ML
INJECTION, SOLUTION EPIDURAL; INFILTRATION; INTRACAUDAL; PERINEURAL AS NEEDED
Status: DISCONTINUED | OUTPATIENT
Start: 2023-02-27 | End: 2023-02-27 | Stop reason: SURG

## 2023-02-27 RX ADMIN — PROPOFOL 100 MG: 10 INJECTION, EMULSION INTRAVENOUS at 10:07

## 2023-02-27 RX ADMIN — SODIUM CHLORIDE 500 ML: 9 INJECTION, SOLUTION INTRAVENOUS at 08:57

## 2023-02-27 RX ADMIN — PROPOFOL 100 MG: 10 INJECTION, EMULSION INTRAVENOUS at 10:12

## 2023-02-27 RX ADMIN — PROPOFOL 50 MG: 10 INJECTION, EMULSION INTRAVENOUS at 10:21

## 2023-02-27 RX ADMIN — PROPOFOL 70 MG: 10 INJECTION, EMULSION INTRAVENOUS at 10:16

## 2023-02-27 RX ADMIN — LIDOCAINE HYDROCHLORIDE 50 MG: 20 INJECTION, SOLUTION EPIDURAL; INFILTRATION; INTRACAUDAL; PERINEURAL at 10:07

## 2023-02-27 NOTE — ANESTHESIA POSTPROCEDURE EVALUATION
Patient: Rico Enrique    Procedure Summary     Date: 02/27/23 Room / Location: [facility] ENDOSCOPY 6 / [facility] ENDOSCOPY    Anesthesia Start: 1005 Anesthesia Stop: 1033    Procedure: COLONOSCOPY WITH ANESTHESIA Diagnosis:       Diverticulitis      Hx of colonic polyp      Family hx of colon cancer      (Diverticulitis [K57.92])      (Hx of colonic polyp [Z86.010])      (Family hx of colon cancer [Z80.0])    Surgeons: Leonidas Ibanez MD Provider: Henrry Beyer CRNA    Anesthesia Type: MAC ASA Status: 2          Anesthesia Type: MAC    Vitals  Vitals Value Taken Time   /68 02/27/23 1031   Temp     Pulse 64 02/27/23 1033   Resp 14 02/27/23 1030   SpO2 98 % 02/27/23 1033   Vitals shown include unvalidated device data.        Post Anesthesia Care and Evaluation    Patient location during evaluation: PHASE II  Patient participation: complete - patient participated  Level of consciousness: awake  Pain management: adequate    Airway patency: patent  Anesthetic complications: No anesthetic complications  PONV Status: none  Cardiovascular status: acceptable  Respiratory status: acceptable  Hydration status: acceptable

## 2023-02-27 NOTE — ANESTHESIA PREPROCEDURE EVALUATION
Anesthesia Evaluation     Patient summary reviewed and Nursing notes reviewed   history of anesthetic complications: PONV  NPO Solid Status: > 8 hours  NPO Liquid Status: > 2 hours           Airway   Mallampati: I  TM distance: >3 FB  Neck ROM: full  No difficulty expected  Dental      Pulmonary    (-) not a smoker  Cardiovascular     (+) hypertension,   (-) CAD      Neuro/Psych  (-) seizures, TIA, CVA  GI/Hepatic/Renal/Endo    (+)   liver disease fatty liver disease,   (-) no renal disease, diabetes    Musculoskeletal     Abdominal    Substance History   Drug use: CLL, small lymphocytic lymphoma.     OB/GYN          Other   blood dyscrasia,   history of cancer active                    Anesthesia Plan    ASA 2     MAC     intravenous induction     Anesthetic plan, risks, benefits, and alternatives have been provided, discussed and informed consent has been obtained with: patient.        CODE STATUS:

## 2023-03-13 ENCOUNTER — LAB (OUTPATIENT)
Dept: LAB | Facility: HOSPITAL | Age: 58
End: 2023-03-13
Payer: COMMERCIAL

## 2023-03-13 DIAGNOSIS — C91.10 CLL (CHRONIC LYMPHOCYTIC LEUKEMIA): ICD-10-CM

## 2023-03-13 LAB
ALBUMIN SERPL-MCNC: 4.6 G/DL (ref 3.5–5.2)
ALBUMIN/GLOB SERPL: 2 G/DL
ALP SERPL-CCNC: 112 U/L (ref 39–117)
ALT SERPL W P-5'-P-CCNC: 25 U/L (ref 1–41)
ANION GAP SERPL CALCULATED.3IONS-SCNC: 9 MMOL/L (ref 5–15)
AST SERPL-CCNC: 29 U/L (ref 1–40)
BILIRUB SERPL-MCNC: 1.6 MG/DL (ref 0–1.2)
BUN SERPL-MCNC: 25 MG/DL (ref 6–20)
BUN/CREAT SERPL: 29.8 (ref 7–25)
BURR CELLS BLD QL SMEAR: ABNORMAL
CALCIUM SPEC-SCNC: 9.3 MG/DL (ref 8.6–10.5)
CHLORIDE SERPL-SCNC: 103 MMOL/L (ref 98–107)
CO2 SERPL-SCNC: 27 MMOL/L (ref 22–29)
CREAT SERPL-MCNC: 0.84 MG/DL (ref 0.76–1.27)
DEPRECATED RDW RBC AUTO: 49.6 FL (ref 37–54)
EGFRCR SERPLBLD CKD-EPI 2021: 100.5 ML/MIN/1.73
ELLIPTOCYTES BLD QL SMEAR: ABNORMAL
ERYTHROCYTE [DISTWIDTH] IN BLOOD BY AUTOMATED COUNT: 14.3 % (ref 12.3–15.4)
GLOBULIN UR ELPH-MCNC: 2.3 GM/DL
GLUCOSE SERPL-MCNC: 116 MG/DL (ref 65–99)
HCT VFR BLD AUTO: 46.8 % (ref 37.5–51)
HGB BLD-MCNC: 14.9 G/DL (ref 13–17.7)
LYMPHOCYTES # BLD MANUAL: 58.41 10*3/MM3 (ref 0.7–3.1)
MCH RBC QN AUTO: 30.2 PG (ref 26.6–33)
MCHC RBC AUTO-ENTMCNC: 31.8 G/DL (ref 31.5–35.7)
MCV RBC AUTO: 94.9 FL (ref 79–97)
NEUTROPHILS # BLD AUTO: 5.85 10*3/MM3 (ref 1.7–7)
NEUTROPHILS NFR BLD MANUAL: 9.1 % (ref 42.7–76)
PLAT MORPH BLD: NORMAL
PLATELET # BLD AUTO: 203 10*3/MM3 (ref 140–450)
PMV BLD AUTO: 9.4 FL (ref 6–12)
POIKILOCYTOSIS BLD QL SMEAR: ABNORMAL
POLYCHROMASIA BLD QL SMEAR: ABNORMAL
POTASSIUM SERPL-SCNC: 4.8 MMOL/L (ref 3.5–5.2)
PROT SERPL-MCNC: 6.9 G/DL (ref 6–8.5)
RBC # BLD AUTO: 4.93 10*6/MM3 (ref 4.14–5.8)
SMUDGE CELLS BLD QL SMEAR: ABNORMAL
SODIUM SERPL-SCNC: 139 MMOL/L (ref 136–145)
VARIANT LYMPHS NFR BLD MANUAL: 2 % (ref 0–5)
VARIANT LYMPHS NFR BLD MANUAL: 88.9 % (ref 19.6–45.3)
WBC NRBC COR # BLD: 64.26 10*3/MM3 (ref 3.4–10.8)

## 2023-03-13 PROCEDURE — 80053 COMPREHEN METABOLIC PANEL: CPT

## 2023-03-13 PROCEDURE — 36415 COLL VENOUS BLD VENIPUNCTURE: CPT

## 2023-03-13 PROCEDURE — 85025 COMPLETE CBC W/AUTO DIFF WBC: CPT

## 2023-03-13 PROCEDURE — 85007 BL SMEAR W/DIFF WBC COUNT: CPT

## 2023-04-03 NOTE — PROGRESS NOTES
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  He is not requiring treatment.  The patient is here alone.  History is obtained from the patient.  He is a reliable historian.        Problem List Items Addressed This Visit    None    Oncology/Hematology History Overview Note   DIAGNOSTIC ABNORMALITIES:  CBC from [facility] 02/05/2013 revealed a WBC of 15.03, hemoglobin 15.6, hematocrit 43.3, MCV 88.4, platelet count 300,000. Absolute lymphocyte count was elevated at 9.7. ANC was 4.7.  PATHOLOGY:  [facility], Fluorescence in-situ Hybridization (FISH ) report, 04/04/2013. 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 Deletion 13q is the most common genetic abnormality in CLL, found in 36-64% of cases. As the sole abnormality, it has a good prognostic value. Both the disease free interval and overall survival are better than in cases with a normal karyotype because of slow disease progression. Monitoring for deletion 13q may be useful in assessing the patient's remission/relapse status. No gene rearrangements characteristic for t(11;14) or abnormalities involving the target regions of chromosomes 6, 11, 12, or 17 were detected. However, it should be noted that the FISH probes utilized in this analysis cannot entirely exclude the presence of other chromosomal abnormalities. Correlation with classic cytogenetics, clinical, flow cytometric, and  morphological data is recommended, if available. 04/10/2013 JF  Peripheral blood, 04/03/2013 ([facility]).  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  Comments:  Flow cytometry identified an abnormal low kappa light chain-restricted B cell population showing coexpression of CD20, CD5, and CD23 without significant expression of CD10, FMC7 or CD79b.  The abnormal B cells are of a predominantly small size and account for approximately 96.2% of the B cells and 37.0% of the total white blood cells.  Roberson-Giemsa stained cytospin preparation shows atypical lymphocytes of a predominantly small to intermediate size.  These findings are indicative of involvement by a mature small B cell neoplasm. The cytomorphologic features and immunophenotype of the neoplastic B cells are consistent with CLL/SLL.  A 1.9% subset of the CD5-positive B cells coexpresses CD38. Of note, in the majority of patients, expression of CD38 on less than 30% of CD5-positive CLL/SLL B cells has been associated with more favorable prognosis. FISH studies for CLL associated genetic abnormalities and IgVH hypermutation analysis by PCR are underway and will be reported separately. Correlation with morphological findings and close clinical followup are recommended.   Abdominal ultrasound done at [facility] on 04/12/2013. IMPRESSION:  1. Mobile gallstone within the lumen of the gallbladder. No gallbladder wall thickening or pericholecystic fluid. No biliary dilatation. Findings suggest cholelithiasis. 2. Echogenic appearance to the liver parenchyma may represent diffuse fatty infiltration. 3. Normal appearance to the kidneys, spleen, and visible portions of the pancreas.   CT of the abdomen and pelvis done on 03/30/2015 at [facility]. Impression: No CT evidence of neoplastic disease. No acute intra-abdominal/pelvic pathological process. Cholelithiasis.  Colonic diverticulosis without CT evidence of acute diverticulitis. Bilateral spondylolysis at L5. Mild spondylolisthesis not excluded.   Ultrasound of the abdomen done on 03/30/2015 at [facility]. Impression: Cholelithiasis. Steatosis of the liver.       PREVIOUS INTERVENTIONS:  Observation.     CLL (chronic lymphocytic leukemia)   12/5/2019 Initial Diagnosis    CLL (chronic lymphocytic leukemia) (CMS/HCC)         PAST MEDICAL HISTORY:  ALLERGIES:  Allergies   Allergen Reactions   • Flagyl [Metronidazole] Other (See Comments)     thrush       CURRENT MEDICATIONS:  Outpatient Encounter Medications as of 4/17/2023   Medication Sig Dispense Refill   • lovastatin (MEVACOR) 20 MG tablet Take 2 tablets by mouth Every Night.     • NIACIN FLUSH FREE 500 MG capsule TK 1 C PO QD  5   • OMEGA-3 FATTY ACIDS PO Take  by mouth.     • valsartan-hydrochlorothiazide (DIOVAN-HCT) 160-25 MG per tablet TK 1 T PO QD       No facility-administered encounter medications on file as of 4/17/2023.     ADULT ILLNESSES:  Patient Active Problem List   Diagnosis Code   • Family hx colonic polyps Z83.71   • Family hx of colon cancer Z80.0   • Hx of colonic polyp Z86.010   • Cervical lymphadenopathy R59.0   • CLL (chronic lymphocytic leukemia) C91.10   • Diverticulitis K57.92     SURGERIES:  Past Surgical History:   Procedure Laterality Date   • COLONOSCOPY  02/15/2013   • COLONOSCOPY N/A 3/19/2018    Procedure: COLONOSCOPY WITH ANESTHESIA;  Surgeon: Leonidas Ibanez MD;  Location: [facility] ENDOSCOPY;  Service: Gastroenterology   • COLONOSCOPY N/A 2/27/2023    Procedure: COLONOSCOPY WITH ANESTHESIA;  Surgeon: Leonidas Ibanez MD;  Location: [facility] ENDOSCOPY;  Service: Gastroenterology;  Laterality: N/A;  Pre: hx polyp, diverticulitis, family hx colon ca  Post: diverticulitis  Lonny Simon MD   • EYE SURGERY     • TONSILLECTOMY AND ADENOIDECTOMY       HEALTH MAINTENANCE ITEMS:  Health Maintenance Due   Topic Date Due   •  "Pneumococcal Vaccine 0-64 (1 - PCV) Never done   • TDAP/TD VACCINES (1 - Tdap) Never done   • HEPATITIS C SCREENING  Never done   • ANNUAL PHYSICAL  Never done   • COVID-19 Vaccine (4 - Booster for Pfizer series) 12/15/2022   • LIPID PANEL  Never done       <no information>  Last Completed Colonoscopy          COLORECTAL CANCER SCREENING (COLONOSCOPY - Every 10 Years) Next due on 2/27/2033 02/27/2023  Surgical Procedure: COLONOSCOPY    02/27/2023  COLONOSCOPY    03/19/2018  Surgical Procedure: COLONOSCOPY    03/19/2018  COLONOSCOPY    03/27/2017  Outside Claim: CHG BLOOD OCCULT,BY PEROXID,FECES,SINGLE, COLORECTAL SCREEN    Only the first 5 history entries have been loaded, but more history exists.              Immunization History   Administered Date(s) Administered   • COVID-19 (PFIZER) BIVALENT BOOSTER 12+YRS 10/20/2022   • COVID-19 (PFIZER) PURPLE CAP 08/26/2021, 09/27/2021     Last Completed Mammogram     This patient has no relevant Health Maintenance data.            FAMILY HISTORY:  Family History   Problem Relation Age of Onset   • Colon cancer Maternal Aunt    • Colon cancer Maternal Aunt    • Colon polyps Maternal Uncle    • Colon cancer Paternal Grandmother      SOCIAL HISTORY:  Social History     Socioeconomic History   • Marital status:    Tobacco Use   • Smoking status: Never   • Smokeless tobacco: Never   Vaping Use   • Vaping Use: Never used   Substance and Sexual Activity   • Alcohol use: Yes     Comment: rare   • Drug use: No   • Sexual activity: Defer       REVIEW OF SYSTEMS:    Review of Systems   Constitutional: Negative for fatigue, fever and unexpected weight loss.        \"I feel pretty good.\"   HENT: Negative for congestion, mouth sores and trouble swallowing.    Eyes: Negative for redness and visual disturbance.   Respiratory: Negative for cough, shortness of breath and wheezing.    Cardiovascular: Negative for chest pain and palpitations.   Gastrointestinal: Negative for " "abdominal pain, nausea and vomiting.   Endocrine: Negative for polydipsia and polyphagia.   Genitourinary: Negative for dysuria, flank pain and hematuria.   Musculoskeletal: Negative for gait problem and joint swelling.   Skin: Negative for pallor.   Allergic/Immunologic: Negative for food allergies.   Neurological: Negative for speech difficulty, weakness and confusion.   Hematological: Negative for adenopathy. Does not bruise/bleed easily.   Psychiatric/Behavioral: Negative for agitation and hallucinations. The patient is not nervous/anxious.          VITAL SIGNS: /70   Pulse 85   Temp 98 °F (36.7 °C)   Resp 18   Ht 180.3 cm (71\")   Wt 96.6 kg (213 lb)   SpO2 97%   BMI 29.71 kg/m²  Body surface area is 2.17 meters squared.  Gained 8 pounds.   Pain Score    04/17/23 0926   PainSc: 0-No pain         PHYSICAL EXAMINATION:     Physical Exam  Vitals reviewed.   Constitutional:       General: He is not in acute distress.  HENT:      Head: Normocephalic and atraumatic.   Eyes:      General: No scleral icterus.  Cardiovascular:      Rate and Rhythm: Normal rate.   Pulmonary:      Effort: No respiratory distress.      Breath sounds: No wheezing or rales.   Abdominal:      General: Bowel sounds are normal.      Palpations: Abdomen is soft.      Tenderness: There is no abdominal tenderness.   Musculoskeletal:         General: No swelling.      Cervical back: Neck supple.   Skin:     General: Skin is warm.      Coloration: Skin is not pale.   Neurological:      Mental Status: He is alert and oriented to person, place, and time.   Psychiatric:         Mood and Affect: Mood normal.         Behavior: Behavior normal.         Thought Content: Thought content normal.         Judgment: Judgment normal.         LABS    Lab Results - Last 18 Months   Lab Units 03/13/23  0706 12/19/22  0701 12/06/22  0854 09/12/22  0704 02/14/22  0659 11/22/21  0704   HEMOGLOBIN g/dL 14.9 13.2 13.8 15.6 15.3 16.0   HEMATOCRIT % 46.8 42.7 " 43.9 46.2 46.3 48.5   MCV fL 94.9 97.9* 97.6* 96.3 93.5 94.7   WBC 10*3/mm3 64.26* 72.43* 60.82* 67.42* 57.53* 57.75*   RDW % 14.3 13.3 13.5 13.6 13.6 13.2   MPV fL 9.4 9.2 8.7 10.1 9.4 9.3   PLATELETS 10*3/mm3 203 288 344 190 199 211   NEUTROS ABS 10*3/mm3 5.85 5.14  --  7.49* 8.23* 5.14   EOS ABS 10*3/mm3  --  0.65*  --   --  0.58* 0.64*   NEUTROPHIL % % 9.1* 7.1*  --  9.5* 14.3* 8.9*   MONOCYTES % %  --   --   --  1.6*  --   --    ATYP LYMPH % % 2.0 0.9  --   --  1.0 17.8*   ANISOCYTOSIS   --   --   --   --  Slight/1+  --        Lab Results - Last 18 Months   Lab Units 03/13/23  0706 09/13/22  0701 09/12/22  0924 02/14/22  0659 11/22/21  0704   GLUCOSE mg/dL 116* 110* 123* 122* 118*   SODIUM mmol/L 139 136 139 142 141   POTASSIUM mmol/L 4.8 5.0 6.0* 4.5 5.2   CO2 mmol/L 27.0 30.0* 30.0* 31.0* 27.0   CHLORIDE mmol/L 103 101 102 102 105   ANION GAP mmol/L 9.0 5.0 7.0 9.0 9.0   CREATININE mg/dL 0.84 1.16 1.10 0.96 0.90   BUN mg/dL 25* 21* 26* 16 18   BUN / CREAT RATIO  29.8* 18.1 23.6 16.7 20.0   CALCIUM mg/dL 9.3 9.6 9.9 9.7 9.5   EGFR IF NONAFRICN AM mL/min/1.73  --   --   --  80 87   ALK PHOS U/L 112  --  123* 122* 117   TOTAL PROTEIN g/dL 6.9  --  7.3 6.8 7.0   ALT (SGPT) U/L 25  --  26 44* 39   AST (SGOT) U/L 29  --  34 42* 47*   BILIRUBIN mg/dL 1.6*  --  1.6* 1.5* 1.5*   ALBUMIN g/dL 4.6  --  4.90 4.50 4.70   GLOBULIN gm/dL 2.3  --  2.4 2.3 2.3       No results for input(s): MSPIKE, KAPPALAMB, IGLFLC, URICACID, FREEKAPPAL, CEA, LDH, REFLABREPO in the last 04547 hours.    No results for input(s): IRON, TIBC, LABIRON, FERRITIN, M8OCKKN, TSH, FOLATE in the last 20435 hours.    Invalid input(s): VITB12      Rico Enrique reports a pain score of 0.         ASSESSMENT:  1.   Chronic lymphocytic leukemia/small lymphocytic lymphoma (CLL/SLL) monoclonal kappa.  Stage 0.  Asymptomatic.  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  Complications: None.  Treatment status: Under observation.  Prognosis: Good.  2.    Performance status of 0.  3.   Elevated liver function tests from statin, cholelithiasis, and fatty liver.  4.   A 1.5 cm right level II node, followed by Dr. Hernandez.    5.   Elevated PSA. Followed by urology.  6.   History of hyperkalemia.          PLAN:  1.    Re:   Heme status.  WBC 64.2 from 72.4, hemoglobin 14.9 from 13.2, hematocrit 46.8 from 42.7, MCV 94.9 from 97.9, platelet 203 from 288 and lymphocytes 58.4 from 66.7 from 58.8.  No rapid doubling.  2.    Re:  Pre-office CMP. Bilirubin 1.6 from 1.6 from 1.5 from 1.5 from 1.9.  .5 mL/min.  3.    Re: Interval CBC 12/19/2022.  WBC 72.4, hemoglobin 13.2, platelet 288 and lymphocyte 66.7.  4.    Re:  Indiications for treatment of CLL/SLL likerapid doubling of lymphocytes, B symptoms, recurrent infections, or symptomatic adenopathies.  5.    Re: Note from Renea Henderson, DENNIS 1/16/2023.  Patient followed for diverticulitis.  He had completed second dose of Augmentin.  Colonoscopy in 6 weeks.  6.    Re: Colonoscopy 2/17/2023.Diverticulosis in the sigmoid colon. The examination was otherwise normal on direct and retroflexion views.  No specimens collected.  Repeat colonoscopy in 10 years.  7.    Re:  Stable for observation.    8.   CBC with differential every 3 months.  9.   Plan of care discussed with patient.  Understanding expressed.  Patient agreeable to proceed.  10.  Continue ongoing management per primary care physician and other specialists.  11.  Return to office in 6 months with pre-office CMP and CBC and differential.        I have reviewed the assessment and plan and verified the accuracy of it. No changes to assessment and plan since the information was documented. Stanley San MD 04/17/23       I spent 30 total minutes, face-to-face, caring for Rico morgan.  Greater than 50% of this time involved counseling and/or coordination of care as documented within this note regarding the patient's  illness(es), pros and cons of various treatment options, instructions and/or risk reduction.              cc:   Konstantin Fleming MD          (Maggie Thompson MD)          (Fredy Waldrop MD)          (Yang Hernandez MD)          (Leonidas Ibanez MD)

## 2023-04-17 ENCOUNTER — OFFICE VISIT (OUTPATIENT)
Dept: ONCOLOGY | Facility: CLINIC | Age: 58
End: 2023-04-17
Payer: COMMERCIAL

## 2023-04-17 VITALS
WEIGHT: 213 LBS | BODY MASS INDEX: 29.82 KG/M2 | TEMPERATURE: 98 F | HEIGHT: 71 IN | DIASTOLIC BLOOD PRESSURE: 70 MMHG | HEART RATE: 85 BPM | RESPIRATION RATE: 18 BRPM | OXYGEN SATURATION: 97 % | SYSTOLIC BLOOD PRESSURE: 118 MMHG

## 2023-04-17 DIAGNOSIS — C91.10 CLL (CHRONIC LYMPHOCYTIC LEUKEMIA): Primary | ICD-10-CM

## 2023-04-17 NOTE — LETTER
April 17, 2023       No Recipients    Patient: Rico Enrique   YOB: 1965   Date of Visit: 4/17/2023       Dear Dr. Duran Recipients:    Thank you for referring Rico Enrique to me for evaluation. Below are the relevant portions of my assessment and plan of care.    If you have questions, please do not hesitate to call me. I look forward to following Rico along with you.         Sincerely,        Stanley San MD        CC:   No Recipients    Stanley San MD  04/17/23 0953  Signed  [facility] HEMATOLOGY & ONCOLOGY [address]  [facility] [address]  [phone]    Patient Name: Rico Enrique  Encounter Date: 04/17/2023  YOB: 1965  Patient Number: [id number]      REASON FOR FOLLOW-UP: Rico Enrique is a pleasant 57-year-old  male who is seen on followup for chronic lymphocytic leukemia/small lymphocytic lymphoma (CLL/SLL), stage 0, monoclonal kappa.  He is not requiring treatment.  The patient is here alone.  History is obtained from the patient.  He is a reliable historian.        Problem List Items Addressed This Visit    None    Oncology/Hematology History Overview Note   DIAGNOSTIC ABNORMALITIES:  CBC from [facility] 02/05/2013 revealed a WBC of 15.03, hemoglobin 15.6, hematocrit 43.3, MCV 88.4, platelet count 300,000. Absolute lymphocyte count was elevated at 9.7. ANC was 4.7.  PATHOLOGY:  [facility], Fluorescence in-situ Hybridization (FISH ) report, 04/04/2013. 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 Deletion 13q is the most common genetic abnormality in CLL, found in 36-64% of cases. As the sole abnormality, it has a good prognostic value. Both the disease free interval and overall survival are better than in cases with a normal karyotype  because of slow disease progression. Monitoring for deletion 13q may be useful in assessing the patient's remission/relapse status. No gene rearrangements characteristic for t(11;14) or abnormalities involving the target regions of chromosomes 6, 11, 12, or 17 were detected. However, it should be noted that the FISH probes utilized in this analysis cannot entirely exclude the presence of other chromosomal abnormalities. Correlation with classic cytogenetics, clinical, flow cytometric, and morphological data is recommended, if available. 04/10/2013 JF  Peripheral blood, 04/03/2013 ([facility]).  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  Comments:  Flow cytometry identified an abnormal low kappa light chain-restricted B cell population showing coexpression of CD20, CD5, and CD23 without significant expression of CD10, FMC7 or CD79b.  The abnormal B cells are of a predominantly small size and account for approximately 96.2% of the B cells and 37.0% of the total white blood cells.  Roberson-Giemsa stained cytospin preparation shows atypical lymphocytes of a predominantly small to intermediate size.  These findings are indicative of involvement by a mature small B cell neoplasm. The cytomorphologic features and immunophenotype of the neoplastic B cells are consistent with CLL/SLL.  A 1.9% subset of the CD5-positive B cells coexpresses CD38. Of note, in the majority of patients, expression of CD38 on less than 30% of CD5-positive CLL/SLL B cells has been associated with more favorable prognosis. FISH studies for CLL associated genetic abnormalities and IgVH hypermutation analysis by PCR are underway and will be reported separately. Correlation with morphological findings and close clinical followup are recommended.   Abdominal ultrasound done at [facility] on 04/12/2013. IMPRESSION:  1. Mobile gallstone within the lumen of the  gallbladder. No gallbladder wall thickening or pericholecystic fluid. No biliary dilatation. Findings suggest cholelithiasis. 2. Echogenic appearance to the liver parenchyma may represent diffuse fatty infiltration. 3. Normal appearance to the kidneys, spleen, and visible portions of the pancreas.   CT of the abdomen and pelvis done on 03/30/2015 at [facility]. Impression: No CT evidence of neoplastic disease. No acute intra-abdominal/pelvic pathological process. Cholelithiasis. Colonic diverticulosis without CT evidence of acute diverticulitis. Bilateral spondylolysis at L5. Mild spondylolisthesis not excluded.   Ultrasound of the abdomen done on 03/30/2015 at [facility]. Impression: Cholelithiasis. Steatosis of the liver.       PREVIOUS INTERVENTIONS:  Observation.     CLL (chronic lymphocytic leukemia)   12/5/2019 Initial Diagnosis    CLL (chronic lymphocytic leukemia) (CMS/HCC)         PAST MEDICAL HISTORY:  ALLERGIES:  Allergies   Allergen Reactions   • Flagyl [Metronidazole] Other (See Comments)     thrush       CURRENT MEDICATIONS:  Outpatient Encounter Medications as of 4/17/2023   Medication Sig Dispense Refill   • lovastatin (MEVACOR) 20 MG tablet Take 2 tablets by mouth Every Night.     • NIACIN FLUSH FREE 500 MG capsule TK 1 C PO QD  5   • OMEGA-3 FATTY ACIDS PO Take  by mouth.     • valsartan-hydrochlorothiazide (DIOVAN-HCT) 160-25 MG per tablet TK 1 T PO QD       No facility-administered encounter medications on file as of 4/17/2023.     ADULT ILLNESSES:  Patient Active Problem List   Diagnosis Code   • Family hx colonic polyps Z83.71   • Family hx of colon cancer Z80.0   • Hx of colonic polyp Z86.010   • Cervical lymphadenopathy R59.0   • CLL (chronic lymphocytic leukemia) C91.10   • Diverticulitis K57.92     SURGERIES:  Past Surgical History:   Procedure Laterality Date   • COLONOSCOPY  02/15/2013   • COLONOSCOPY N/A 3/19/2018    Procedure: COLONOSCOPY WITH ANESTHESIA;   Surgeon: Leonidas Ibanez MD;  Location:  [facility];  Service: Gastroenterology   • COLONOSCOPY N/A 2/27/2023    Procedure: COLONOSCOPY WITH ANESTHESIA;  Surgeon: Leonidas Ibanez MD;  Location:  [facility];  Service: Gastroenterology;  Laterality: N/A;  Pre: hx polyp, diverticulitis, family hx colon ca  Post: diverticulitis  Lonny Simon MD   • EYE SURGERY     • TONSILLECTOMY AND ADENOIDECTOMY       HEALTH MAINTENANCE ITEMS:  Health Maintenance Due   Topic Date Due   • Pneumococcal Vaccine 0-64 (1 - PCV) Never done   • TDAP/TD VACCINES (1 - Tdap) Never done   • HEPATITIS C SCREENING  Never done   • ANNUAL PHYSICAL  Never done   • COVID-19 Vaccine (4 - Booster for Pfizer series) 12/15/2022   • LIPID PANEL  Never done       <no information>  Last Completed Colonoscopy          COLORECTAL CANCER SCREENING (COLONOSCOPY - Every 10 Years) Next due on 2/27/2033 02/27/2023  Surgical Procedure: COLONOSCOPY    02/27/2023  COLONOSCOPY    03/19/2018  Surgical Procedure: COLONOSCOPY    03/19/2018  COLONOSCOPY    03/27/2017  Outside Claim: CHG BLOOD OCCULT,BY PEROXID,FECES,SINGLE, COLORECTAL SCREEN    Only the first 5 history entries have been loaded, but more history exists.              Immunization History   Administered Date(s) Administered   • COVID-19 (PFIZER) BIVALENT BOOSTER 12+YRS 10/20/2022   • COVID-19 (PFIZER) PURPLE CAP 08/26/2021, 09/27/2021     Last Completed Mammogram     This patient has no relevant Health Maintenance data.            FAMILY HISTORY:  Family History   Problem Relation Age of Onset   • Colon cancer Maternal Aunt    • Colon cancer Maternal Aunt    • Colon polyps Maternal Uncle    • Colon cancer Paternal Grandmother      SOCIAL HISTORY:  Social History     Socioeconomic History   • Marital status:    Tobacco Use   • Smoking status: Never   • Smokeless tobacco: Never   Vaping Use   • Vaping Use: Never used   Substance and Sexual Activity   • Alcohol use: Yes      "Comment: rare   • Drug use: No   • Sexual activity: Defer       REVIEW OF SYSTEMS:    Review of Systems   Constitutional: Negative for fatigue, fever and unexpected weight loss.        \"I feel pretty good.\"   HENT: Negative for congestion, mouth sores and trouble swallowing.    Eyes: Negative for redness and visual disturbance.   Respiratory: Negative for cough, shortness of breath and wheezing.    Cardiovascular: Negative for chest pain and palpitations.   Gastrointestinal: Negative for abdominal pain, nausea and vomiting.   Endocrine: Negative for polydipsia and polyphagia.   Genitourinary: Negative for dysuria, flank pain and hematuria.   Musculoskeletal: Negative for gait problem and joint swelling.   Skin: Negative for pallor.   Allergic/Immunologic: Negative for food allergies.   Neurological: Negative for speech difficulty, weakness and confusion.   Hematological: Negative for adenopathy. Does not bruise/bleed easily.   Psychiatric/Behavioral: Negative for agitation and hallucinations. The patient is not nervous/anxious.          VITAL SIGNS: /70   Pulse 85   Temp 98 °F (36.7 °C)   Resp 18   Ht 180.3 cm (71\")   Wt 96.6 kg (213 lb)   SpO2 97%   BMI 29.71 kg/m²  Body surface area is 2.17 meters squared.  Gained 8 pounds.   Pain Score    04/17/23 0926   PainSc: 0-No pain         PHYSICAL EXAMINATION:     Physical Exam  Vitals reviewed.   Constitutional:       General: He is not in acute distress.  HENT:      Head: Normocephalic and atraumatic.   Eyes:      General: No scleral icterus.  Cardiovascular:      Rate and Rhythm: Normal rate.   Pulmonary:      Effort: No respiratory distress.      Breath sounds: No wheezing or rales.   Abdominal:      General: Bowel sounds are normal.      Palpations: Abdomen is soft.      Tenderness: There is no abdominal tenderness.   Musculoskeletal:         General: No swelling.      Cervical back: Neck supple.   Skin:     General: Skin is warm.      Coloration: Skin " is not pale.   Neurological:      Mental Status: He is alert and oriented to person, place, and time.   Psychiatric:         Mood and Affect: Mood normal.         Behavior: Behavior normal.         Thought Content: Thought content normal.         Judgment: Judgment normal.         LABS    Lab Results - Last 18 Months   Lab Units 03/13/23  0706 12/19/22  0701 12/06/22  0854 09/12/22  0704 02/14/22  0659 11/22/21  0704   HEMOGLOBIN g/dL 14.9 13.2 13.8 15.6 15.3 16.0   HEMATOCRIT % 46.8 42.7 43.9 46.2 46.3 48.5   MCV fL 94.9 97.9* 97.6* 96.3 93.5 94.7   WBC 10*3/mm3 64.26* 72.43* 60.82* 67.42* 57.53* 57.75*   RDW % 14.3 13.3 13.5 13.6 13.6 13.2   MPV fL 9.4 9.2 8.7 10.1 9.4 9.3   PLATELETS 10*3/mm3 203 288 344 190 199 211   NEUTROS ABS 10*3/mm3 5.85 5.14  --  7.49* 8.23* 5.14   EOS ABS 10*3/mm3  --  0.65*  --   --  0.58* 0.64*   NEUTROPHIL % % 9.1* 7.1*  --  9.5* 14.3* 8.9*   MONOCYTES % %  --   --   --  1.6*  --   --    ATYP LYMPH % % 2.0 0.9  --   --  1.0 17.8*   ANISOCYTOSIS   --   --   --   --  Slight/1+  --        Lab Results - Last 18 Months   Lab Units 03/13/23  0706 09/13/22  0701 09/12/22  0924 02/14/22  0659 11/22/21  0704   GLUCOSE mg/dL 116* 110* 123* 122* 118*   SODIUM mmol/L 139 136 139 142 141   POTASSIUM mmol/L 4.8 5.0 6.0* 4.5 5.2   CO2 mmol/L 27.0 30.0* 30.0* 31.0* 27.0   CHLORIDE mmol/L 103 101 102 102 105   ANION GAP mmol/L 9.0 5.0 7.0 9.0 9.0   CREATININE mg/dL 0.84 1.16 1.10 0.96 0.90   BUN mg/dL 25* 21* 26* 16 18   BUN / CREAT RATIO  29.8* 18.1 23.6 16.7 20.0   CALCIUM mg/dL 9.3 9.6 9.9 9.7 9.5   EGFR IF NONAFRICN AM mL/min/1.73  --   --   --  80 87   ALK PHOS U/L 112  --  123* 122* 117   TOTAL PROTEIN g/dL 6.9  --  7.3 6.8 7.0   ALT (SGPT) U/L 25  --  26 44* 39   AST (SGOT) U/L 29  --  34 42* 47*   BILIRUBIN mg/dL 1.6*  --  1.6* 1.5* 1.5*   ALBUMIN g/dL 4.6  --  4.90 4.50 4.70   GLOBULIN gm/dL 2.3  --  2.4 2.3 2.3       No results for input(s): MSPIKE, KAPPALAMB, IGLFLC, URICACID, FREEKAPPAL,  CEA, LDH, REFLABREPO in the last 85750 hours.    No results for input(s): IRON, TIBC, LABIRON, FERRITIN, [id number], TSH, FOLATE in the last 14564 hours.    Invalid input(s): VITB12      Rico Enrique reports a pain score of 0.         ASSESSMENT:  1.   Chronic lymphocytic leukemia/small lymphocytic lymphoma (CLL/SLL) monoclonal kappa.  Stage 0.  Asymptomatic.  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  Complications: None.  Treatment status: Under observation.  Prognosis: Good.  2.   Performance status of 0.  3.   Elevated liver function tests from statin, cholelithiasis, and fatty liver.  4.   A 1.5 cm right level II node, followed by Dr. Hernandez.    5.   Elevated PSA. Followed by urology.  6.   History of hyperkalemia.          PLAN:  1.    Re:   Heme status.  WBC 64.2 from 72.4, hemoglobin 14.9 from 13.2, hematocrit 46.8 from 42.7, MCV 94.9 from 97.9, platelet 203 from 288 and lymphocytes 58.4 from 66.7 from 58.8.  No rapid doubling.  2.    Re:  Pre-office CMP. Bilirubin 1.6 from 1.6 from 1.5 from 1.5 from 1.9.  .5 mL/min.  3.    Re: Interval CBC 12/19/2022.  WBC 72.4, hemoglobin 13.2, platelet 288 and lymphocyte 66.7.  4.    Re:  Indiications for treatment of CLL/SLL likerapid doubling of lymphocytes, B symptoms, recurrent infections, or symptomatic adenopathies.  5.    Re: Note from Renea Hendersno, DENNIS 1/16/2023.  Patient followed for diverticulitis.  He had completed second dose of Augmentin.  Colonoscopy in 6 weeks.  6.    Re: Colonoscopy 2/17/2023.Diverticulosis in the sigmoid colon. The examination was otherwise normal on direct and retroflexion views.  No specimens collected.  Repeat colonoscopy in 10 years.  7.    Re:  Stable for observation.    8.   CBC with differential every 3 months.  9.   Plan of care discussed with patient.  Understanding expressed.  Patient agreeable to proceed.  10.  Continue ongoing management per primary care physician and  other specialists.  11.  Return to office in 6 months with pre-office CMP and CBC and differential.        I have reviewed the assessment and plan and verified the accuracy of it. No changes to assessment and plan since the information was documented. Stanley San MD 04/17/23       I spent 30 total minutes, face-to-face, caring for Rico morgan.  Greater than 50% of this time involved counseling and/or coordination of care as documented within this note regarding the patient's illness(es), pros and cons of various treatment options, instructions and/or risk reduction.              cc:   Konstantin Fleming MD          (Maggie Thompson MD)          (Fredy Waldrop MD)          (Ynag Hernandez MD)          (Leonidas Ibanez MD)

## 2023-04-24 ENCOUNTER — TELEPHONE (OUTPATIENT)
Dept: GASTROENTEROLOGY | Facility: CLINIC | Age: 58
End: 2023-04-24
Payer: COMMERCIAL

## 2023-04-24 NOTE — TELEPHONE ENCOUNTER
Caller: Marbella Enrique    Relationship: Emergency Contact    Best call back number: [phone]    What does billing need from the patient: DATES OF APPTS WERE 1/16/23 and 2/27/23.    MARBELLA CALLED STATED THAT THESE WERE TO BE PREVENTATIVE CARE AND THEY WERE BILLED INCORRECTLY PLEASE REVIEW AND CALL TO DISCUSS.

## 2023-05-03 NOTE — TELEPHONE ENCOUNTER
"Per Barbara in coding \"I have reviewed this chart and what was billed. It was billed as a screening/preventative with a [id number]. DX codes listed were Z12.11, Z80.0, Z87.19 and K57.30\".  Spoke to patient, he called his insurance company and they will refile.     "

## 2023-10-02 NOTE — PROGRESS NOTES
[facility] GROUP HEMATOLOGY & ONCOLOGY  [address]  [facility] [address] [phone]    Patient Name: Rico Enrique  Encounter Date: 10/16/2023  YOB: 1965  Patient Number: [id number]      REASON FOR FOLLOW-UP: Rico Enrique is a pleasant 58-year-old  male who is seen on followup for stage 0 chronic lymphocytic leukemia/small lymphocytic lymphoma (CLL/SLL), monoclonal kappa.  He is off therapy.  The patient is here alone.  History is obtained from the patient. History is reliable.         Problem List Items Addressed This Visit    None    Oncology/Hematology History Overview Note   DIAGNOSTIC ABNORMALITIES:  CBC from [facility] 02/05/2013 revealed a WBC of 15.03, hemoglobin 15.6, hematocrit 43.3, MCV 88.4, platelet count 300,000. Absolute lymphocyte count was elevated at 9.7. ANC was 4.7.  PATHOLOGY:  [facility], Fluorescence in-situ Hybridization (FISH ) report, 04/04/2013. 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 Deletion 13q is the most common genetic abnormality in CLL, found in 36-64% of cases. As the sole abnormality, it has a good prognostic value. Both the disease free interval and overall survival are better than in cases with a normal karyotype because of slow disease progression. Monitoring for deletion 13q may be useful in assessing the patient's remission/relapse status. No gene rearrangements characteristic for t(11;14) or abnormalities involving the target regions of chromosomes 6, 11, 12, or 17 were detected. However, it should be noted that the FISH probes utilized in this analysis cannot entirely exclude the presence of other chromosomal abnormalities. Correlation with classic cytogenetics, clinical, flow cytometric, and morphological data is  recommended, if available. 04/10/2013 JF  Peripheral blood, 04/03/2013 ([facility]).  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  Comments:  Flow cytometry identified an abnormal low kappa light chain-restricted B cell population showing coexpression of CD20, CD5, and CD23 without significant expression of CD10, FMC7 or CD79b.  The abnormal B cells are of a predominantly small size and account for approximately 96.2% of the B cells and 37.0% of the total white blood cells.  Roberson-Giemsa stained cytospin preparation shows atypical lymphocytes of a predominantly small to intermediate size.  These findings are indicative of involvement by a mature small B cell neoplasm. The cytomorphologic features and immunophenotype of the neoplastic B cells are consistent with CLL/SLL.  A 1.9% subset of the CD5-positive B cells coexpresses CD38. Of note, in the majority of patients, expression of CD38 on less than 30% of CD5-positive CLL/SLL B cells has been associated with more favorable prognosis. FISH studies for CLL associated genetic abnormalities and IgVH hypermutation analysis by PCR are underway and will be reported separately. Correlation with morphological findings and close clinical followup are recommended.   Abdominal ultrasound done at [facility] on 04/12/2013. IMPRESSION:  1. Mobile gallstone within the lumen of the gallbladder. No gallbladder wall thickening or pericholecystic fluid. No biliary dilatation. Findings suggest cholelithiasis. 2. Echogenic appearance to the liver parenchyma may represent diffuse fatty infiltration. 3. Normal appearance to the kidneys, spleen, and visible portions of the pancreas.   CT of the abdomen and pelvis done on 03/30/2015 at [facility]. Impression: No CT evidence of neoplastic disease. No acute intra-abdominal/pelvic pathological process. Cholelithiasis. Colonic diverticulosis  without CT evidence of acute diverticulitis. Bilateral spondylolysis at L5. Mild spondylolisthesis not excluded.   Ultrasound of the abdomen done on 03/30/2015 at [facility]. Impression: Cholelithiasis. Steatosis of the liver.       PREVIOUS INTERVENTIONS:  Observation.     CLL (chronic lymphocytic leukemia)   12/5/2019 Initial Diagnosis    CLL (chronic lymphocytic leukemia) (CMS/HCC)         PAST MEDICAL HISTORY:  ALLERGIES:  Allergies   Allergen Reactions    Flagyl [Metronidazole] Other (See Comments)     thrush       CURRENT MEDICATIONS:  Outpatient Encounter Medications as of 10/16/2023   Medication Sig Dispense Refill    lovastatin (MEVACOR) 20 MG tablet Take 2 tablets by mouth Every Night.      NIACIN FLUSH FREE 500 MG capsule TK 1 C PO QD  5    OMEGA-3 FATTY ACIDS PO Take  by mouth.      valsartan-hydrochlorothiazide (DIOVAN-HCT) 160-25 MG per tablet TK 1 T PO QD       No facility-administered encounter medications on file as of 10/16/2023.     ADULT ILLNESSES:  Patient Active Problem List   Diagnosis Code    Family hx colonic polyps Z83.719    Family hx of colon cancer Z80.0    Hx of colonic polyp Z86.010    Cervical lymphadenopathy R59.0    CLL (chronic lymphocytic leukemia) C91.10    Diverticulitis K57.92     SURGERIES:  Past Surgical History:   Procedure Laterality Date    COLONOSCOPY  02/15/2013    COLONOSCOPY N/A 3/19/2018    Procedure: COLONOSCOPY WITH ANESTHESIA;  Surgeon: Leonidas Ibanez MD;  Location: [facility] ENDOSCOPY;  Service: Gastroenterology    COLONOSCOPY N/A 2/27/2023    Procedure: COLONOSCOPY WITH ANESTHESIA;  Surgeon: Leonidas Ibanez MD;  Location: [facility] ENDOSCOPY;  Service: Gastroenterology;  Laterality: N/A;  Pre: hx polyp, diverticulitis, family hx colon ca  Post: diverticulitis  Lonny Simon MD    EYE SURGERY      TONSILLECTOMY AND ADENOIDECTOMY       HEALTH MAINTENANCE ITEMS:  Health Maintenance Due   Topic Date Due    BMI FOLLOWUP  Never done     Pneumococcal Vaccine 0-64 (1 - PCV) Never done    TDAP/TD VACCINES (1 - Tdap) Never done    HEPATITIS C SCREENING  Never done    ANNUAL PHYSICAL  Never done    LIPID PANEL  Never done    INFLUENZA VACCINE  08/01/2023    COVID-19 Vaccine (4 - 2023-24 season) 09/01/2023       <no information>  Last Completed Colonoscopy            COLORECTAL CANCER SCREENING (COLONOSCOPY - Every 10 Years) Next due on 2/27/2033 02/27/2023  COLONOSCOPY    02/27/2023  Surgical Procedure: COLONOSCOPY    03/19/2018  Surgical Procedure: COLONOSCOPY    03/19/2018  COLONOSCOPY    03/27/2017  Outside Claim: CHG BLOOD OCCULT,BY PEROXID,FECES,SINGLE, COLORECTAL SCREEN    Only the first 5 history entries have been loaded, but more history exists.                  Immunization History   Administered Date(s) Administered    COVID-19 (PFIZER) BIVALENT 12+YRS 10/20/2022    COVID-19 (PFIZER) Purple Cap Monovalent 08/26/2021, 09/27/2021     Last Completed Mammogram       This patient has no relevant Health Maintenance data.              FAMILY HISTORY:  Family History   Problem Relation Age of Onset    Colon cancer Maternal Aunt     Colon cancer Maternal Aunt     Colon polyps Maternal Uncle     Colon cancer Paternal Grandmother      SOCIAL HISTORY:  Social History     Socioeconomic History    Marital status:    Tobacco Use    Smoking status: Never    Smokeless tobacco: Never   Vaping Use    Vaping Use: Never used   Substance and Sexual Activity    Alcohol use: Yes     Comment: rare    Drug use: No    Sexual activity: Defer       REVIEW OF SYSTEMS:    Review of Systems   Constitutional:  Negative for fatigue, fever and unexpected weight loss.   Respiratory:  Negative for shortness of breath and wheezing.    Cardiovascular:  Negative for chest pain and leg swelling.   Gastrointestinal:  Negative for abdominal pain, nausea and vomiting.   Genitourinary:  Negative for dysuria and flank pain.   Musculoskeletal:  Negative for gait problem and  "joint swelling.   Skin:  Negative for pallor.   Neurological:  Negative for speech difficulty, weakness and confusion.   Hematological:  Negative for adenopathy. Does not bruise/bleed easily.         VITAL SIGNS: /72   Pulse 78   Temp 97.6 °F (36.4 °C)   Resp 18   Ht 180.3 cm (71\")   Wt 95.7 kg (211 lb)   SpO2 98%   BMI 29.43 kg/m²  Body surface area is 2.16 meters squared.  Lost 2 pounds.   Pain Score    10/16/23 0814   PainSc: 0-No pain           PHYSICAL EXAMINATION:     Physical Exam  Vitals reviewed.   Constitutional:       General: He is not in acute distress.  Cardiovascular:      Rate and Rhythm: Normal rate.   Pulmonary:      Effort: No respiratory distress.      Breath sounds: No wheezing or rales.   Abdominal:      General: Bowel sounds are normal.      Palpations: Abdomen is soft.      Tenderness: There is no abdominal tenderness.   Musculoskeletal:         General: No swelling.   Skin:     General: Skin is warm.      Coloration: Skin is not pale.   Neurological:      Mental Status: He is alert and oriented to person, place, and time.         LABS    Lab Results - Last 18 Months   Lab Units 10/09/23  0706 03/13/23  0706 12/19/22  0701 12/06/22  0854 09/12/22  0704   HEMOGLOBIN g/dL 15.1 14.9 13.2 13.8 15.6   HEMATOCRIT % 46.0 46.8 42.7 43.9 46.2   MCV fL 97.5* 94.9 97.9* 97.6* 96.3   WBC 10*3/mm3 76.58* 64.26* 72.43* 60.82* 67.42*   RDW % 13.4 14.3 13.3 13.5 13.6   MPV fL 9.7 9.4 9.2 8.7 10.1   PLATELETS 10*3/mm3 196 203 288 344 190   NEUTROS ABS 10*3/mm3 14.40* 5.85 5.14  --  7.49*   EOS ABS 10*3/mm3  --   --  0.65*  --   --    NEUTROPHIL % % 18.8* 9.1* 7.1*  --  9.5*   MONOCYTES % %  --   --   --   --  1.6*   ATYP LYMPH % %  --  2.0 0.9  --   --    ANISOCYTOSIS  Slight/1+  --   --   --   --        Lab Results - Last 18 Months   Lab Units 10/09/23  0706 03/13/23  0706 09/13/22  0701 09/12/22  0924   GLUCOSE mg/dL 124* 116* 110* 123*   SODIUM mmol/L 143 139 136 139   POTASSIUM mmol/L 4.2 4.8 " "5.0 6.0*   CO2 mmol/L 28.0 27.0 30.0* 30.0*   CHLORIDE mmol/L 102 103 101 102   ANION GAP mmol/L 13.0 9.0 5.0 7.0   CREATININE mg/dL 0.93 0.84 1.16 1.10   BUN mg/dL 16 25* 21* 26*   BUN / CREAT RATIO  17.2 29.8* 18.1 23.6   CALCIUM mg/dL 9.5 9.3 9.6 9.9   ALK PHOS U/L 113 112  --  123*   TOTAL PROTEIN g/dL 6.9 6.9  --  7.3   ALT (SGPT) U/L 23 25  --  26   AST (SGOT) U/L 33 29  --  34   BILIRUBIN mg/dL 1.2 1.6*  --  1.6*   ALBUMIN g/dL 4.5 4.6  --  4.90   GLOBULIN gm/dL 2.4 2.3  --  2.4       No results for input(s): \"MSPIKE\", \"KAPPALAMB\", \"IGLFLC\", \"URICACID\", \"FREEKAPPAL\", \"CEA\", \"LDH\", \"REFLABREPO\" in the last 44636 hours.    No results for input(s): \"IRON\", \"TIBC\", \"LABIRON\", \"FERRITIN\", \"B4QDNDB\", \"TSH\", \"FOLATE\" in the last 25648 hours.    Invalid input(s): \"VITB12\"      Rico Enrique reports a pain score of 0.              ASSESSMENT:  1.   Chronic lymphocytic leukemia/small lymphocytic lymphoma (CLL/SLL) monoclonal kappa.  Stage 0.  Asymptomatic.  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  Complications: None.  Treatment status: Off therapy.  Prognosis: Good.  2.   Performance status of 0.  3.   Elevated liver function tests from statin, cholelithiasis, and fatty liver.  4.   A 1.5 cm right level II node, followed by Dr. Hernandez.    5.   Elevated PSA. Followed by urology.  6.   History of hyperkalemia.  7.   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           PLAN:  1.    Re:   Heme status.  WBC 76.5 from 64.2 from 72.4, hemoglobin 15.1, hematocrit 46 , MCV 97.5, platelet 196 from 203 from 288 and lymphocytes 62.26 from 58.4 from 66.7 from 58.8.  There is no rapid doubling.  2.    Re:  Pre-office CMP. Bilirubin 1.2 from 1.6 from 1.6 from 1.5.  GFR 94 mL/min.  3.    Re:  No interval CBC due on 7/2023.  4.    Re:  Indiications for treatment of CLL/SLL like rapid doubling of lymphocytes, B symptoms, symptomatic or bulky adenopathies or " recurrent infections requiring hospitalization with intravenous antibiotics.  5.    Re: Noncompliance with interval laboratory 7/2023.  6.    Re:  Re:  Stable for observation, CLL.    7.   CBC with differential every 3 months.  8.   Plan of care discussed with patient.  Understanding expressed.  Patient is agreeable to proceed.  9.   Continue ongoing management per primary care physician and other specialists.  10.  Return to office in 6 months with pre-office CMP and CBC and differential.            I have reviewed the assessment and plan and verified the accuracy of it. No changes to assessment and plan since the information was documented. Stanley San MD 10/16/23       I spent 21 total minutes, face-to-face, caring for Rico morgan. Greater than 50% of this time involved counseling and/or coordination of care as documented within this note.              cc:   Konstantin Fleming MD          (Maggie Thompson MD)          (Fredy Waldrop MD)          (Yang Hernandez MD)          (Leonidas Ibanez MD)

## 2023-10-09 ENCOUNTER — LAB (OUTPATIENT)
Dept: LAB | Facility: HOSPITAL | Age: 58
End: 2023-10-09
Payer: COMMERCIAL

## 2023-10-09 ENCOUNTER — TELEPHONE (OUTPATIENT)
Dept: ONCOLOGY | Facility: CLINIC | Age: 58
End: 2023-10-09
Payer: COMMERCIAL

## 2023-10-09 DIAGNOSIS — C91.10 CLL (CHRONIC LYMPHOCYTIC LEUKEMIA): ICD-10-CM

## 2023-10-09 LAB
ALBUMIN SERPL-MCNC: 4.5 G/DL (ref 3.5–5.2)
ALBUMIN/GLOB SERPL: 1.9 G/DL
ALP SERPL-CCNC: 113 U/L (ref 39–117)
ALT SERPL W P-5'-P-CCNC: 23 U/L (ref 1–41)
ANION GAP SERPL CALCULATED.3IONS-SCNC: 13 MMOL/L (ref 5–15)
ANISOCYTOSIS BLD QL: ABNORMAL
AST SERPL-CCNC: 33 U/L (ref 1–40)
BILIRUB SERPL-MCNC: 1.2 MG/DL (ref 0–1.2)
BUN SERPL-MCNC: 16 MG/DL (ref 8–23)
BUN/CREAT SERPL: 17.2 (ref 7–25)
CALCIUM SPEC-SCNC: 9.5 MG/DL (ref 8.6–10.5)
CHLORIDE SERPL-SCNC: 102 MMOL/L (ref 98–107)
CO2 SERPL-SCNC: 28 MMOL/L (ref 22–29)
CREAT SERPL-MCNC: 0.93 MG/DL (ref 0.76–1.27)
DACRYOCYTES BLD QL SMEAR: ABNORMAL
DEPRECATED RDW RBC AUTO: 48 FL (ref 37–54)
EGFRCR SERPLBLD CKD-EPI 2021: 94 ML/MIN/1.73
ERYTHROCYTE [DISTWIDTH] IN BLOOD BY AUTOMATED COUNT: 13.4 % (ref 12.3–15.4)
GLOBULIN UR ELPH-MCNC: 2.4 GM/DL
GLUCOSE SERPL-MCNC: 124 MG/DL (ref 65–99)
HCT VFR BLD AUTO: 46 % (ref 37.5–51)
HGB BLD-MCNC: 15.1 G/DL (ref 13–17.7)
LYMPHOCYTES # BLD MANUAL: 62.26 10*3/MM3 (ref 0.7–3.1)
MCH RBC QN AUTO: 32 PG (ref 26.6–33)
MCHC RBC AUTO-ENTMCNC: 32.8 G/DL (ref 31.5–35.7)
MCV RBC AUTO: 97.5 FL (ref 79–97)
NEUTROPHILS # BLD AUTO: 14.4 10*3/MM3 (ref 1.7–7)
NEUTROPHILS NFR BLD MANUAL: 18.8 % (ref 42.7–76)
PLAT MORPH BLD: NORMAL
PLATELET # BLD AUTO: 196 10*3/MM3 (ref 140–450)
PMV BLD AUTO: 9.7 FL (ref 6–12)
POIKILOCYTOSIS BLD QL SMEAR: ABNORMAL
POTASSIUM SERPL-SCNC: 4.2 MMOL/L (ref 3.5–5.2)
PROT SERPL-MCNC: 6.9 G/DL (ref 6–8.5)
RBC # BLD AUTO: 4.72 10*6/MM3 (ref 4.14–5.8)
SMUDGE CELLS BLD QL SMEAR: ABNORMAL
SODIUM SERPL-SCNC: 143 MMOL/L (ref 136–145)
VARIANT LYMPHS NFR BLD MANUAL: 81.3 % (ref 19.6–45.3)
WBC NRBC COR # BLD: 76.58 10*3/MM3 (ref 3.4–10.8)

## 2023-10-09 PROCEDURE — 80053 COMPREHEN METABOLIC PANEL: CPT

## 2023-10-09 PROCEDURE — 85025 COMPLETE CBC W/AUTO DIFF WBC: CPT

## 2023-10-09 PROCEDURE — 85007 BL SMEAR W/DIFF WBC COUNT: CPT

## 2023-10-09 PROCEDURE — 36415 COLL VENOUS BLD VENIPUNCTURE: CPT

## 2023-10-09 NOTE — TELEPHONE ENCOUNTER
CRITICAL LAB VALUE  Received call from [facility] with CRITICAL LAB VALUE    WBC: 76.58    This information was sent to Dr San for review

## 2023-10-16 ENCOUNTER — OFFICE VISIT (OUTPATIENT)
Dept: ONCOLOGY | Facility: CLINIC | Age: 58
End: 2023-10-16
Payer: COMMERCIAL

## 2023-10-16 VITALS
HEART RATE: 78 BPM | DIASTOLIC BLOOD PRESSURE: 72 MMHG | WEIGHT: 211 LBS | TEMPERATURE: 97.6 F | RESPIRATION RATE: 18 BRPM | HEIGHT: 71 IN | SYSTOLIC BLOOD PRESSURE: 128 MMHG | BODY MASS INDEX: 29.54 KG/M2 | OXYGEN SATURATION: 98 %

## 2023-10-16 DIAGNOSIS — C91.10 CLL (CHRONIC LYMPHOCYTIC LEUKEMIA): Primary | ICD-10-CM

## 2023-10-16 NOTE — LETTER
October 16, 2023       No Recipients    Patient: Rico Enrique   YOB: 1965   Date of Visit: 10/16/2023     Dear Konstantin Fleming PA-C:       Thank you for referring Rico Enrique to me for evaluation. Below are the relevant portions of my assessment and plan of care.    If you have questions, please do not hesitate to call me. I look forward to following Rico along with you.         Sincerely,        Stanley San MD        CC:   No Recipients    Stanley San MD  10/16/23 0828  Sign when Signing Visit  [facility] H[facility]ATOLOGY & ONCOLOGY  [address]  [place] [id number] [phone]    Patient Name: Rico Enrique  Encounter Date: 10/16/2023  YOB: 1965  Patient Number: [id number]      REASON FOR FOLLOW-UP: Rico Enrique is a pleasant 58-year-old  male who is seen on followup for stage 0 chronic lymphocytic leukemia/small lymphocytic lymphoma (CLL/SLL), monoclonal kappa.  He is off therapy.  The patient is here alone.  History is obtained from the patient. History is reliable.         Problem List Items Addressed This Visit    None    Oncology/Hematology History Overview Note   DIAGNOSTIC ABNORMALITIES:  CBC from [facility] 02/05/2013 revealed a WBC of 15.03, hemoglobin 15.6, hematocrit 43.3, MCV 88.4, platelet count 300,000. Absolute lymphocyte count was elevated at 9.7. ANC was 4.7.  PATHOLOGY:  [facility], Fluorescence in-situ Hybridization (FISH ) report, 04/04/2013. 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 Deletion 13q is the most common genetic abnormality in CLL, found in 36-64% of cases. As the sole abnormality, it has a good prognostic value. Both the disease free interval and overall survival are better than in cases with a normal karyotype  because of slow disease progression. Monitoring for deletion 13q may be useful in assessing the patient's remission/relapse status. No gene rearrangements characteristic for t(11;14) or abnormalities involving the target regions of chromosomes 6, 11, 12, or 17 were detected. However, it should be noted that the FISH probes utilized in this analysis cannot entirely exclude the presence of other chromosomal abnormalities. Correlation with classic cytogenetics, clinical, flow cytometric, and morphological data is recommended, if available. 04/10/2013 JF  Peripheral blood, 04/03/2013 ([facility]).  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  Comments:  Flow cytometry identified an abnormal low kappa light chain-restricted B cell population showing coexpression of CD20, CD5, and CD23 without significant expression of CD10, FMC7 or CD79b.  The abnormal B cells are of a predominantly small size and account for approximately 96.2% of the B cells and 37.0% of the total white blood cells.  Roberson-Giemsa stained cytospin preparation shows atypical lymphocytes of a predominantly small to intermediate size.  These findings are indicative of involvement by a mature small B cell neoplasm. The cytomorphologic features and immunophenotype of the neoplastic B cells are consistent with CLL/SLL.  A 1.9% subset of the CD5-positive B cells coexpresses CD38. Of note, in the majority of patients, expression of CD38 on less than 30% of CD5-positive CLL/SLL B cells has been associated with more favorable prognosis. FISH studies for CLL associated genetic abnormalities and IgVH hypermutation analysis by PCR are underway and will be reported separately. Correlation with morphological findings and close clinical followup are recommended.   Abdominal ultrasound done at [facility] on 04/12/2013. IMPRESSION:  1. Mobile gallstone within the lumen of the  gallbladder. No gallbladder wall thickening or pericholecystic fluid. No biliary dilatation. Findings suggest cholelithiasis. 2. Echogenic appearance to the liver parenchyma may represent diffuse fatty infiltration. 3. Normal appearance to the kidneys, spleen, and visible portions of the pancreas.   CT of the abdomen and pelvis done on 03/30/2015 at [facility]. Impression: No CT evidence of neoplastic disease. No acute intra-abdominal/pelvic pathological process. Cholelithiasis. Colonic diverticulosis without CT evidence of acute diverticulitis. Bilateral spondylolysis at L5. Mild spondylolisthesis not excluded.   Ultrasound of the abdomen done on 03/30/2015 at [facility]. Impression: Cholelithiasis. Steatosis of the liver.       PREVIOUS INTERVENTIONS:  Observation.     CLL (chronic lymphocytic leukemia)   12/5/2019 Initial Diagnosis    CLL (chronic lymphocytic leukemia) (CMS/HCC)         PAST MEDICAL HISTORY:  ALLERGIES:  Allergies   Allergen Reactions   • Flagyl [Metronidazole] Other (See Comments)     thrush       CURRENT MEDICATIONS:  Outpatient Encounter Medications as of 10/16/2023   Medication Sig Dispense Refill   • lovastatin (MEVACOR) 20 MG tablet Take 2 tablets by mouth Every Night.     • NIACIN FLUSH FREE 500 MG capsule TK 1 C PO QD  5   • OMEGA-3 FATTY ACIDS PO Take  by mouth.     • valsartan-hydrochlorothiazide (DIOVAN-HCT) 160-25 MG per tablet TK 1 T PO QD       No facility-administered encounter medications on file as of 10/16/2023.     ADULT ILLNESSES:  Patient Active Problem List   Diagnosis Code   • Family hx colonic polyps Z83.719   • Family hx of colon cancer Z80.0   • Hx of colonic polyp Z86.010   • Cervical lymphadenopathy R59.0   • CLL (chronic lymphocytic leukemia) C91.10   • Diverticulitis K57.92     SURGERIES:  Past Surgical History:   Procedure Laterality Date   • COLONOSCOPY  02/15/2013   • COLONOSCOPY N/A 3/19/2018    Procedure: COLONOSCOPY WITH  ANESTHESIA;  Surgeon: Leonidas Ibanez MD;  Location:  [facility];  Service: Gastroenterology   • COLONOSCOPY N/A 2/27/2023    Procedure: COLONOSCOPY WITH ANESTHESIA;  Surgeon: Leonidas Ibanez MD;  Location:  [facility];  Service: Gastroenterology;  Laterality: N/A;  Pre: hx polyp, diverticulitis, family hx colon ca  Post: diverticulitis  Lonny Simon MD   • EYE SURGERY     • TONSILLECTOMY AND ADENOIDECTOMY       HEALTH MAINTENANCE ITEMS:  Health Maintenance Due   Topic Date Due   • BMI FOLLOWUP  Never done   • Pneumococcal Vaccine 0-64 (1 - PCV) Never done   • TDAP/TD VACCINES (1 - Tdap) Never done   • HEPATITIS C SCREENING  Never done   • ANNUAL PHYSICAL  Never done   • LIPID PANEL  Never done   • INFLUENZA VACCINE  08/01/2023   • COVID-19 Vaccine (4 - 2023-24 season) 09/01/2023       <no information>  Last Completed Colonoscopy            COLORECTAL CANCER SCREENING (COLONOSCOPY - Every 10 Years) Next due on 2/27/2033 02/27/2023  COLONOSCOPY    02/27/2023  Surgical Procedure: COLONOSCOPY    03/19/2018  Surgical Procedure: COLONOSCOPY    03/19/2018  COLONOSCOPY    03/27/2017  Outside Claim: CHG BLOOD OCCULT,BY PEROXID,FECES,SINGLE, COLORECTAL SCREEN    Only the first 5 history entries have been loaded, but more history exists.                  Immunization History   Administered Date(s) Administered   • COVID-19 (PFIZER) BIVALENT 12+YRS 10/20/2022   • COVID-19 (PFIZER) Purple Cap Monovalent 08/26/2021, 09/27/2021     Last Completed Mammogram       This patient has no relevant Health Maintenance data.              FAMILY HISTORY:  Family History   Problem Relation Age of Onset   • Colon cancer Maternal Aunt    • Colon cancer Maternal Aunt    • Colon polyps Maternal Uncle    • Colon cancer Paternal Grandmother      SOCIAL HISTORY:  Social History     Socioeconomic History   • Marital status:    Tobacco Use   • Smoking status: Never   • Smokeless tobacco: Never   Vaping Use   •  "Vaping Use: Never used   Substance and Sexual Activity   • Alcohol use: Yes     Comment: rare   • Drug use: No   • Sexual activity: Defer       REVIEW OF SYSTEMS:    Review of Systems   Constitutional:  Negative for fatigue, fever and unexpected weight loss.   Respiratory:  Negative for shortness of breath and wheezing.    Cardiovascular:  Negative for chest pain and leg swelling.   Gastrointestinal:  Negative for abdominal pain, nausea and vomiting.   Genitourinary:  Negative for dysuria and flank pain.   Musculoskeletal:  Negative for gait problem and joint swelling.   Skin:  Negative for pallor.   Neurological:  Negative for speech difficulty, weakness and confusion.   Hematological:  Negative for adenopathy. Does not bruise/bleed easily.         VITAL SIGNS: /72   Pulse 78   Temp 97.6 °F (36.4 °C)   Resp 18   Ht 180.3 cm (71\")   Wt 95.7 kg (211 lb)   SpO2 98%   BMI 29.43 kg/m²  Body surface area is 2.16 meters squared.  Lost 2 pounds.   Pain Score    10/16/23 0814   PainSc: 0-No pain           PHYSICAL EXAMINATION:     Physical Exam  Vitals reviewed.   Constitutional:       General: He is not in acute distress.  Cardiovascular:      Rate and Rhythm: Normal rate.   Pulmonary:      Effort: No respiratory distress.      Breath sounds: No wheezing or rales.   Abdominal:      General: Bowel sounds are normal.      Palpations: Abdomen is soft.      Tenderness: There is no abdominal tenderness.   Musculoskeletal:         General: No swelling.   Skin:     General: Skin is warm.      Coloration: Skin is not pale.   Neurological:      Mental Status: He is alert and oriented to person, place, and time.         LABS    Lab Results - Last 18 Months   Lab Units 10/09/23  0706 03/13/23  0706 12/19/22  0701 12/06/22  0854 09/12/22  0704   HEMOGLOBIN g/dL 15.1 14.9 13.2 13.8 15.6   HEMATOCRIT % 46.0 46.8 42.7 43.9 46.2   MCV fL 97.5* 94.9 97.9* 97.6* 96.3   WBC 10*3/mm3 76.58* 64.26* 72.43* 60.82* 67.42*   RDW % " "13.4 14.3 13.3 13.5 13.6   MPV fL 9.7 9.4 9.2 8.7 10.1   PLATELETS 10*3/mm3 196 203 288 344 190   NEUTROS ABS 10*3/mm3 14.40* 5.85 5.14  --  7.49*   EOS ABS 10*3/mm3  --   --  0.65*  --   --    NEUTROPHIL % % 18.8* 9.1* 7.1*  --  9.5*   MONOCYTES % %  --   --   --   --  1.6*   ATYP LYMPH % %  --  2.0 0.9  --   --    ANISOCYTOSIS  Slight/1+  --   --   --   --        Lab Results - Last 18 Months   Lab Units 10/09/23  0706 03/13/23  0706 09/13/22  0701 09/12/22  0924   GLUCOSE mg/dL 124* 116* 110* 123*   SODIUM mmol/L 143 139 136 139   POTASSIUM mmol/L 4.2 4.8 5.0 6.0*   CO2 mmol/L 28.0 27.0 30.0* 30.0*   CHLORIDE mmol/L 102 103 101 102   ANION GAP mmol/L 13.0 9.0 5.0 7.0   CREATININE mg/dL 0.93 0.84 1.16 1.10   BUN mg/dL 16 25* 21* 26*   BUN / CREAT RATIO  17.2 29.8* 18.1 23.6   CALCIUM mg/dL 9.5 9.3 9.6 9.9   ALK PHOS U/L 113 112  --  123*   TOTAL PROTEIN g/dL 6.9 6.9  --  7.3   ALT (SGPT) U/L 23 25  --  26   AST (SGOT) U/L 33 29  --  34   BILIRUBIN mg/dL 1.2 1.6*  --  1.6*   ALBUMIN g/dL 4.5 4.6  --  4.90   GLOBULIN gm/dL 2.4 2.3  --  2.4       No results for input(s): \"MSPIKE\", \"KAPPALAMB\", \"IGLFLC\", \"URICACID\", \"FREEKAPPAL\", \"CEA\", \"LDH\", \"REFLABREPO\" in the last 13432 hours.    No results for input(s): \"IRON\", \"TIBC\", \"LABIRON\", \"FERRITIN\", \"I1VJZWL\", \"TSH\", \"FOLATE\" in the last 82395 hours.    Invalid input(s): \"VITB12\"      Rico Enrique reports a pain score of 0.              ASSESSMENT:  1.   Chronic lymphocytic leukemia/small lymphocytic lymphoma (CLL/SLL) monoclonal kappa.  Stage 0.  Asymptomatic.  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  Complications: None.  Treatment status: Off therapy.  Prognosis: Good.  2.   Performance status of 0.  3.   Elevated liver function tests from statin, cholelithiasis, and fatty liver.  4.   A 1.5 cm right level II node, followed by Dr. Hernandez.    5.   Elevated PSA. Followed by urology.  6.   History of hyperkalemia.  7.   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           PLAN:  1.    Re:   Heme status.  WBC 76.5 from 64.2 from 72.4, hemoglobin 15.1, hematocrit 46 , MCV 97.5, platelet 196 from 203 from 288 and lymphocytes 62.26 from 58.4 from 66.7 from 58.8.  There is no rapid doubling.  2.    Re:  Pre-office CMP. Bilirubin 1.2 from 1.6 from 1.6 from 1.5.  GFR 94 mL/min.  3.    Re:  No interval CBC due on 7/2023.  4.    Re:  Indiications for treatment of CLL/SLL like rapid doubling of lymphocytes, B symptoms, symptomatic or bulky adenopathies or recurrent infections requiring hospitalization with intravenous antibiotics.  5.    Re: Noncompliance with interval laboratory 7/2023.  6.    Re:  Re:  Stable for observation, CLL.    7.   CBC with differential every 3 months.  8.   Plan of care discussed with patient.  Understanding expressed.  Patient is agreeable to proceed.  9.   Continue ongoing management per primary care physician and other specialists.  10.  Return to office in 6 months with pre-office CMP and CBC and differential.            I have reviewed the assessment and plan and verified the accuracy of it. No changes to assessment and plan since the information was documented. Stanley San MD 10/16/23       I spent 21 total minutes, face-to-face, caring for Rico morgan. Greater than 50% of this time involved counseling and/or coordination of care as documented within this note.              cc:   Konstantin Fleming MD          (Maggie Thompson MD)          (Fredy Waldrop MD)          (Yang Hernandez MD)          (Leonidas Ibanez MD)

## 2024-02-27 ENCOUNTER — NURSE TRIAGE (OUTPATIENT)
Dept: CALL CENTER | Facility: HOSPITAL | Age: 59
End: 2024-02-27
Payer: COMMERCIAL

## 2024-02-27 NOTE — TELEPHONE ENCOUNTER
"Kimberli Mcmillan from [employer] called Critical Lab Value.  WBC 76.6    Lab Value called to [facility].  No new orders.  Reason for Disposition  • Lab or radiology calling with CRITICAL test results    Additional Information  • Negative: Lab calling with strep throat test results and triager can call in prescription  • Negative: Lab calling with urinalysis test results and triager can call in prescription  • Negative: Medication questions  • Negative: Medication renewal and refill questions  • Negative: Pre-operative or pre-procedural questions  • Negative: ED call to PCP (i.e., primary care provider; doctor, NP, or PA)  • Negative: Doctor (or NP/PA) call to PCP  • Negative: Call about patient who is currently hospitalized    Answer Assessment - Initial Assessment Questions  1. REASON FOR CALL or QUESTION: \"What is your reason for calling today?\" or \"How can I best  help you?\" or \"What question do you have that I can help answer?\"      Kimberli Mcmillan from [employer] calling critical lab value on patient.  WBC 76.6  2. CALLER: Document the source of call. (e.g., laboratory, patient).      Lab reported value.  Called [facility] to report value.  No new orders.    Protocols used: PCP Call - No Triage-ADULT-    "

## 2024-03-14 ENCOUNTER — TELEPHONE (OUTPATIENT)
Dept: GASTROENTEROLOGY | Facility: CLINIC | Age: 59
End: 2024-03-14

## 2024-03-14 NOTE — TELEPHONE ENCOUNTER
Provider: DR MICHELE    Caller: MARBELLA HOUGH    Relationship to Patient: SPOU[facility]    Pharmacy:     Phone Number: [phone]    Reason for Call: SHE IS WANTING TO KNOW IF WE CAN RECOMMEND SOMEONE FOR LAPAROSCOPIC GALL BLADDER SURGERY.    PLEASE CALL TO ADVISE

## 2024-03-25 ENCOUNTER — OFFICE VISIT (OUTPATIENT)
Dept: SURGERY | Facility: CLINIC | Age: 59
End: 2024-03-25
Payer: COMMERCIAL

## 2024-03-25 ENCOUNTER — PATIENT ROUNDING (BHMG ONLY) (OUTPATIENT)
Dept: SURGERY | Facility: CLINIC | Age: 59
End: 2024-03-25
Payer: COMMERCIAL

## 2024-03-25 VITALS
SYSTOLIC BLOOD PRESSURE: 119 MMHG | BODY MASS INDEX: 29.54 KG/M2 | DIASTOLIC BLOOD PRESSURE: 75 MMHG | WEIGHT: 211 LBS | HEIGHT: 71 IN

## 2024-03-25 DIAGNOSIS — K80.20 GALLSTONES: Primary | ICD-10-CM

## 2024-03-25 DIAGNOSIS — E66.3 OVERWEIGHT WITH BODY MASS INDEX (BMI) 25.0-29.9: ICD-10-CM

## 2024-03-25 DIAGNOSIS — C91.10 CLL (CHRONIC LYMPHOCYTIC LEUKEMIA): ICD-10-CM

## 2024-03-25 PROCEDURE — 99204 OFFICE O/P NEW MOD 45 MIN: CPT | Performed by: STUDENT IN AN ORGANIZED HEALTH CARE EDUCATION/TRAINING PROGRAM

## 2024-03-25 RX ORDER — INDOCYANINE GREEN AND WATER 25 MG
3.75 KIT INJECTION ONCE
OUTPATIENT
Start: 2024-03-25 | End: 2024-03-25

## 2024-03-25 RX ORDER — FENOFIBRATE 145 MG/1
TABLET, COATED ORAL
COMMUNITY
Start: 2024-03-01

## 2024-03-25 RX ORDER — HEPARIN SODIUM 5000 [USP'U]/ML
5000 INJECTION, SOLUTION INTRAVENOUS; SUBCUTANEOUS ONCE
OUTPATIENT
Start: 2024-03-25 | End: 2024-03-25

## 2024-03-25 NOTE — PROGRESS NOTES
March 25, 2024    Hello, may I speak with Rico Enrique?    My name is Ana        I am  with [facility]  [facility]  [address]  [place] [address] [phone].    Before we get started may I verify your date of birth? 1965    I am calling to officially welcome you to our practice and ask about your recent visit. Is this a good time to talk? yes    Tell me about your visit with us. What things went well?  Visit went very well.        We're always looking for ways to make our patients' experiences even better. Do you have recommendations on ways we may improve?  no    Overall were you satisfied with your first visit to our practice? yes       I appreciate you taking the time to speak with me today. Is there anything else I can do for you? no      Thank you, and have a great day.

## 2024-03-25 NOTE — PROGRESS NOTES
Office New Patient History and Physical:     Referring Provider: Konstantin Fleming P*    Chief Complaint   Patient presents with    Cholelithiasis     Patient is here to discuss gallbladder       Subjective .     History of present illness:  Rico Enrique is a 58 y.o. male who presents for a gallbladder consultation. His bilirubin was high on his physical and has known gallstones from an ultrasound a few years ago. He has had a few intermittent sharp pains in his abdomen. He denies nausea and vomiting.     BMI is 29. He does have CLL but is not on treatment for it right now. He is a non-smoker. No prior surgery on his abdomen.     History  Past Medical History:   Diagnosis Date    Abnormal liver enzymes     pt states has a fatty liver    Cholelithiasis     Colon polyp     Diverticulosis     Elevated cholesterol     Enlarged prostate     dr rivera    Hemorrhoids     Hypertension     Leukemia 2013    chronic lymphocytic, dr al     Lymphoma 2013    small lymphoma, dr al     PONCHING (postoperative nausea and vomiting)    ,   Past Surgical History:   Procedure Laterality Date    COLONOSCOPY  02/15/2013    COLONOSCOPY N/A 3/19/2018    Procedure: COLONOSCOPY WITH ANESTHESIA;  Surgeon: Leonidas Ibanez MD;  Location: [facility] ENDOSCOPY;  Service: Gastroenterology    COLONOSCOPY N/A 2/27/2023    Procedure: COLONOSCOPY WITH ANESTHESIA;  Surgeon: Leonidas Ibanez MD;  Location: [facility] ENDOSCOPY;  Service: Gastroenterology;  Laterality: N/A;  Pre: hx polyp, diverticulitis, family hx colon ca  Post: diverticulitis  Lonny Simon MD    EYE SURGERY      TONSILLECTOMY AND ADENOIDECTOMY     ,   Family History   Problem Relation Age of Onset    Colon cancer Maternal Aunt     Colon cancer Maternal Aunt     Colon polyps Maternal Uncle     Colon cancer Paternal Grandmother    ,   Social History     Tobacco Use    Smoking status: Never    Smokeless tobacco: Never   Vaping Use    Vaping status: Never Used   Substance Use  "Topics    Alcohol use: Yes     Comment: rare    Drug use: No   , (Not in a hospital admission)   and Allergies:  Flagyl [metronidazole]    Current Outpatient Medications:     fenofibrate (TRICOR) 145 MG tablet, , Disp: , Rfl:     lovastatin (MEVACOR) 20 MG tablet, Take 2 tablets by mouth Every Night., Disp: , Rfl:     NIACIN FLUSH FREE 500 MG capsule, TK 1 C PO QD, Disp: , Rfl: 5    OMEGA-3 FATTY ACIDS PO, Take  by mouth., Disp: , Rfl:     valsartan-hydrochlorothiazide (DIOVAN-HCT) 160-25 MG per tablet, TK 1 T PO QD, Disp: , Rfl:     Review of Systems    Review of Systems - General ROS: negative  ENT ROS: negative  Respiratory ROS: no cough, shortness of breath, or wheezing  Cardiovascular ROS: no chest pain or dyspnea on exertion  Gastrointestinal ROS: positive for - abdominal pain  Genito-Urinary ROS: no dysuria, trouble voiding, or hematuria  Dermatological ROS: negative   Breast ROS: negative for breast lumps  Hematological and Lymphatic ROS: positive for known CLL   Musculoskeletal ROS: negative   Neurological ROS: no TIA or stroke symptoms    Psychological ROS: negative  Endocrine ROS: negative    Objective       Vital Signs   /75 (BP Location: Left arm, Patient Position: Sitting, Cuff Size: Large Adult)   Ht 180.3 cm (70.98\")   Wt 95.7 kg (211 lb)   BMI 29.44 kg/m²      Physical Exam:  General appearance - alert, well appearing, and in no distress  Mental status - alert, oriented to person, place, and time  Eyes - pupils equal and reactive, extraocular eye movements intact  Neck - supple, no significant adenopathy  Chest - no tachypnea, retractions or cyanosis  Heart - normal rate and regular rhythm  Abdomen - soft, nontender, nondistended, no masses or organomegaly  Neurological - alert, oriented, normal speech, no focal findings or movement disorder noted\    Physical Exam  Abdominal:               Results Review:     The following data was reviewed by: Suad Padron MD on " 03/25/2024:  REFERRAL/PRE-AUTH MRN - SCAN - REF FROM DR DENNIS (03/15/2024)   Ultrasound: Gallstones   T bili 1.4     Assessment & Plan       Diagnoses and all orders for this visit:    1. Gallstones (Primary)  -     Case Request; Standing  -     XR chest 1 vw; Future  -     ECG 12 Lead; Future  -     ceFAZolin (ANCEF) 2,000 mg in sodium chloride 0.9 % 100 mL IVPB  -     indocyanine green (IC-GREEN) injection 3.75 mg  -     heparin (porcine) 5000 UNIT/ML injection 5,000 Units  -     CBC & Differential; Future  -     Comprehensive Metabolic Panel; Future  -     Case Request    2. Overweight with body mass index (BMI) 25.0-29.9    3. CLL (chronic lymphocytic leukemia)  Overview:  DIAGNOSTIC ABNORMALITIES:  CBC from [facility] 02/05/2013 revealed a WBC of 15.03, hemoglobin 15.6, hematocrit 43.3, MCV 88.4, platelet count 300,000. Absolute lymphocyte count was elevated at 9.7. ANC was 4.7.  PATHOLOGY:  [facility], Fluorescence in-situ Hybridization (FISH ) report, 04/04/2013. 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 Deletion 13q is the most common genetic abnormality in CLL, found in 36-64% of cases. As the sole abnormality, it has a good prognostic value. Both the disease free interval and overall survival are better than in cases with a normal karyotype because of slow disease progression. Monitoring for deletion 13q may be useful in assessing the patient's remission/relapse status. No gene rearrangements characteristic for t(11;14) or abnormalities involving the target regions of chromosomes 6, 11, 12, or 17 were detected. However, it should be noted that the FISH probes utilized in this analysis cannot entirely exclude the presence of other chromosomal abnormalities. Correlation with classic cytogenetics, clinical, flow cytometric, and morphological data is  recommended, if available. 04/10/2013 JF  Peripheral blood, 04/03/2013 ([facility]).  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  Comments:  Flow cytometry identified an abnormal low kappa light chain-restricted B cell population showing coexpression of CD20, CD5, and CD23 without significant expression of CD10, FMC7 or CD79b.  The abnormal B cells are of a predominantly small size and account for approximately 96.2% of the B cells and 37.0% of the total white blood cells.  Roberson-Giemsa stained cytospin preparation shows atypical lymphocytes of a predominantly small to intermediate size.  These findings are indicative of involvement by a mature small B cell neoplasm. The cytomorphologic features and immunophenotype of the neoplastic B cells are consistent with CLL/SLL.  A 1.9% subset of the CD5-positive B cells coexpresses CD38. Of note, in the majority of patients, expression of CD38 on less than 30% of CD5-positive CLL/SLL B cells has been associated with more favorable prognosis. FISH studies for CLL associated genetic abnormalities and IgVH hypermutation analysis by PCR are underway and will be reported separately. Correlation with morphological findings and close clinical followup are recommended.   Abdominal ultrasound done at [facility] on 04/12/2013. IMPRESSION:  1. Mobile gallstone within the lumen of the gallbladder. No gallbladder wall thickening or pericholecystic fluid. No biliary dilatation. Findings suggest cholelithiasis. 2. Echogenic appearance to the liver parenchyma may represent diffuse fatty infiltration. 3. Normal appearance to the kidneys, spleen, and visible portions of the pancreas.   CT of the abdomen and pelvis done on 03/30/2015 at [facility]. Impression: No CT evidence of neoplastic disease. No acute intra-abdominal/pelvic pathological process. Cholelithiasis. Colonic diverticulosis  without CT evidence of acute diverticulitis. Bilateral spondylolysis at L5. Mild spondylolisthesis not excluded.   Ultrasound of the abdomen done on 03/30/2015 at [facility]. Impression: Cholelithiasis. Steatosis of the liver.       PREVIOUS INTERVENTIONS:  Observation.      Other orders  -     Follow Anesthesia Guidelines / Protocol; Future  -     Follow Anesthesia Guidelines / Protocol; Standing  -     Verify / Perform Chlorhexidine Skin Prep; Standing  -     Verify / Perform Chlorhexidine Skin Prep if Indicated (If Not Already Completed); Standing  -     Obtain Informed Consent; Future  -     Provide NPO Instructions to Patient; Future  -     Chlorhexidine Skin Prep; Future  -     Notify physician (specify); Standing  -     Instructions on coughing, deep breathing, and incentive spirometry.; Standing  -     Oxygen Therapy-; Standing       Rico Enrique is a 58 y.o. male with biliary colic 2/2 cholelithiasis. History and imaging above are consistent with this diagnosis.  The symptoms including their nature, timing and duration are most consistent with biliary colic. I reviewed the gallbladder anatomy with the patient, and after a thorough discussion of risks (including bleeding, infection, bile leak, damage to surrounding structures including the common bile duct, and risks of anesthesia) and benefits, the patient wishes to proceed with laparoscopic robotic assisted cholecystectomy, possible cholangiogram.The patient has an appointment for pre-op work up including EKG, CXR, CMP and CBC. I contacted Dr. San and he does not recommend any DVT prophylaxis other than my pre-op heparin dose in light of his CLL.  The patient is currently scheduled for laparoscopic robotic assisted cholecystectomy, with ICG guided cholangiogram possible fluoroscopy on 4/19/24.     This is a chronic problem. I have reviewed the note, US and labs above. I have ordered CBC, cMP, CXR and EKG. I have discussed the patient with  Dr. San. He is at increased risk of perioperative complications 2/2 his elevated Bmi and CLL.     I also discussed with the patient post operative pain management including multimodal pain control utilizing Tylenol, ibuprofen, and Roxicodone for breakthrough pain. I will plan to give the patient 10 tabs of 5mg Roxicodone post operatively for breakthrough pain      Suad Padron MD  03/26/24  16:15 CDT

## 2024-03-25 NOTE — PATIENT INSTRUCTIONS
"Surgery Scheduled: 04/19/24; Arrive @ 6:00  Pre-work: 04/12/24 @ 4:00 (Eat and drink like normal on this day)  NPO after midnight the night before surgery  Check in at the main registration for pre-work and surgery        BMI for Adults  What is BMI?  Body mass index (BMI) is a number that is calculated from a person's weight and height. BMI can help estimate how much of a person's weight is composed of fat. BMI does not measure body fat directly. Rather, it is an alternative to procedures that directly measure body fat, which can be difficult and expensive.  BMI can help identify people who may be at higher risk for certain medical problems.  What are BMI measurements used for?  BMI is used as a screening tool to identify possible weight problems. It helps determine whether a person is obese, overweight, a healthy weight, or underweight.  BMI is useful for:  Identifying a weight problem that may be related to a medical condition or may increase the risk for medical problems.  Promoting changes, such as changes in diet and exercise, to help reach a healthy weight. BMI screening can be repeated to see if these changes are working.  How is BMI calculated?  BMI involves measuring your weight in relation to your height. Both height and weight are measured, and the BMI is calculated from those numbers. This can be done either in English ([place] or metric measurements. Note that charts and online BMI calculators are available to help you find your BMI quickly and easily without having to do these calculations yourself.  To calculate your BMI in English ([place] measurements:    Measure your weight in pounds (lb).  Multiply the number of pounds by 703.  For example, for a person who weighs 180 lb, multiply that number by 703, which equals 126,540.  Measure your height in inches. Then multiply that number by itself to get a measurement called \"inches squared.\"  For example, for a person who is 70 inches tall, the \"inches " "squared\" measurement is 70 inches x 70 inches, which equals 4,900 inches squared.  Divide the total from step 2 (number of lb x 703) by the total from step 3 (inches squared): 126,540 ÷ 4,900 = 25.8. This is your BMI.    To calculate your BMI in metric measurements:  Measure your weight in kilograms (kg).  Measure your height in meters (m). Then multiply that number by itself to get a measurement called \"meters squared.\"  For example, for a person who is 1.75 m tall, the \"meters squared\" measurement is 1.75 m x 1.75 m, which is equal to 3.1 meters squared.  Divide the number of kilograms (your weight) by the meters squared number. In this example: 70 ÷ 3.1 = 22.6. This is your BMI.  What do the results mean?  BMI charts are used to identify whether you are underweight, normal weight, overweight, or obese. The following guidelines will be used:  Underweight: BMI less than 18.5.  Normal weight: BMI between 18.5 and 24.9.  Overweight: BMI between 25 and 29.9.  Obese: BMI of 30 or above.  Keep these notes in mind:  Weight includes both fat and muscle, so someone with a muscular build, such as an athlete, may have a BMI that is higher than 24.9. In cases like these, BMI is not an accurate measure of body fat.  To determine if excess body fat is the cause of a BMI of 25 or higher, further assessments may need to be done by a health care provider.  BMI is usually interpreted in the same way for men and women.  Where to find more information  For more information about BMI, including tools to quickly calculate your BMI, go to these websites:  Centers for Disease Control and Prevention: www.cdc.gov  American Heart Association: www.heart.org  National Heart, Lung, and Blood [place]: www.nhlbi.nih.gov  Summary  Body mass index (BMI) is a number that is calculated from a person's weight and height.  BMI may help estimate how much of a person's weight is composed of fat. BMI can help identify those who may be at higher risk " for certain medical problems.  BMI can be measured using English measurements or metric measurements.  BMI charts are used to identify whether you are underweight, normal weight, overweight, or obese.  This information is not intended to replace advice given to you by your health care provider. Make sure you discuss any questions you have with your health care provider.  Document Revised: 09/09/2020 Document Reviewed: 07/17/2020  Elsevier Patient Education © 2021 [employer]

## 2024-03-25 NOTE — H&P (VIEW-ONLY)
Office New Patient History and Physical:     Referring Provider: Konstantin Fleming P*    Chief Complaint   Patient presents with    Cholelithiasis     Patient is here to discuss gallbladder       Subjective .     History of present illness:  Rico Enrique is a 58 y.o. male who presents for a gallbladder consultation. His bilirubin was high on his physical and has known gallstones from an ultrasound a few years ago. He has had a few intermittent sharp pains in his abdomen. He denies nausea and vomiting.     BMI is 29. He does have CLL but is not on treatment for it right now. He is a non-smoker. No prior surgery on his abdomen.     History  Past Medical History:   Diagnosis Date    Abnormal liver enzymes     pt states has a fatty liver    Cholelithiasis     Colon polyp     Diverticulosis     Elevated cholesterol     Enlarged prostate     dr rivera    Hemorrhoids     Hypertension     Leukemia 2013    chronic lymphocytic, dr al     Lymphoma 2013    small lymphoma, dr al     PONCHING (postoperative nausea and vomiting)    ,   Past Surgical History:   Procedure Laterality Date    COLONOSCOPY  02/15/2013    COLONOSCOPY N/A 3/19/2018    Procedure: COLONOSCOPY WITH ANESTHESIA;  Surgeon: Leonidas Ibanez MD;  Location: [facility] ENDOSCOPY;  Service: Gastroenterology    COLONOSCOPY N/A 2/27/2023    Procedure: COLONOSCOPY WITH ANESTHESIA;  Surgeon: Leonidas Ibanez MD;  Location: [facility] ENDOSCOPY;  Service: Gastroenterology;  Laterality: N/A;  Pre: hx polyp, diverticulitis, family hx colon ca  Post: diverticulitis  Lonny Simon MD    EYE SURGERY      TONSILLECTOMY AND ADENOIDECTOMY     ,   Family History   Problem Relation Age of Onset    Colon cancer Maternal Aunt     Colon cancer Maternal Aunt     Colon polyps Maternal Uncle     Colon cancer Paternal Grandmother    ,   Social History     Tobacco Use    Smoking status: Never    Smokeless tobacco: Never   Vaping Use    Vaping status: Never Used   Substance Use  "Topics    Alcohol use: Yes     Comment: rare    Drug use: No   , (Not in a hospital admission)   and Allergies:  Flagyl [metronidazole]    Current Outpatient Medications:     fenofibrate (TRICOR) 145 MG tablet, , Disp: , Rfl:     lovastatin (MEVACOR) 20 MG tablet, Take 2 tablets by mouth Every Night., Disp: , Rfl:     NIACIN FLUSH FREE 500 MG capsule, TK 1 C PO QD, Disp: , Rfl: 5    OMEGA-3 FATTY ACIDS PO, Take  by mouth., Disp: , Rfl:     valsartan-hydrochlorothiazide (DIOVAN-HCT) 160-25 MG per tablet, TK 1 T PO QD, Disp: , Rfl:     Review of Systems    Review of Systems - General ROS: negative  ENT ROS: negative  Respiratory ROS: no cough, shortness of breath, or wheezing  Cardiovascular ROS: no chest pain or dyspnea on exertion  Gastrointestinal ROS: positive for - abdominal pain  Genito-Urinary ROS: no dysuria, trouble voiding, or hematuria  Dermatological ROS: negative   Breast ROS: negative for breast lumps  Hematological and Lymphatic ROS: positive for known CLL   Musculoskeletal ROS: negative   Neurological ROS: no TIA or stroke symptoms    Psychological ROS: negative  Endocrine ROS: negative    Objective       Vital Signs   /75 (BP Location: Left arm, Patient Position: Sitting, Cuff Size: Large Adult)   Ht 180.3 cm (70.98\")   Wt 95.7 kg (211 lb)   BMI 29.44 kg/m²      Physical Exam:  General appearance - alert, well appearing, and in no distress  Mental status - alert, oriented to person, place, and time  Eyes - pupils equal and reactive, extraocular eye movements intact  Neck - supple, no significant adenopathy  Chest - no tachypnea, retractions or cyanosis  Heart - normal rate and regular rhythm  Abdomen - soft, nontender, nondistended, no masses or organomegaly  Neurological - alert, oriented, normal speech, no focal findings or movement disorder noted\    Physical Exam  Abdominal:               Results Review:     The following data was reviewed by: Suad Padron MD on " 03/25/2024:  REFERRAL/PRE-AUTH MRN - SCAN - REF FROM DR DENNIS (03/15/2024)   Ultrasound: Gallstones   T bili 1.4     Assessment & Plan       Diagnoses and all orders for this visit:    1. Gallstones (Primary)  -     Case Request; Standing  -     XR chest 1 vw; Future  -     ECG 12 Lead; Future  -     ceFAZolin (ANCEF) 2,000 mg in sodium chloride 0.9 % 100 mL IVPB  -     indocyanine green (IC-GREEN) injection 3.75 mg  -     heparin (porcine) 5000 UNIT/ML injection 5,000 Units  -     CBC & Differential; Future  -     Comprehensive Metabolic Panel; Future  -     Case Request    2. Overweight with body mass index (BMI) 25.0-29.9    3. CLL (chronic lymphocytic leukemia)  Overview:  DIAGNOSTIC ABNORMALITIES:  CBC from [facility] 02/05/2013 revealed a WBC of 15.03, hemoglobin 15.6, hematocrit 43.3, MCV 88.4, platelet count 300,000. Absolute lymphocyte count was elevated at 9.7. ANC was 4.7.  PATHOLOGY:  [facility], Fluorescence in-situ Hybridization (FISH ) report, 04/04/2013. 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 Deletion 13q is the most common genetic abnormality in CLL, found in 36-64% of cases. As the sole abnormality, it has a good prognostic value. Both the disease free interval and overall survival are better than in cases with a normal karyotype because of slow disease progression. Monitoring for deletion 13q may be useful in assessing the patient's remission/relapse status. No gene rearrangements characteristic for t(11;14) or abnormalities involving the target regions of chromosomes 6, 11, 12, or 17 were detected. However, it should be noted that the FISH probes utilized in this analysis cannot entirely exclude the presence of other chromosomal abnormalities. Correlation with classic cytogenetics, clinical, flow cytometric, and morphological data is  recommended, if available. 04/10/2013 JF  Peripheral blood, 04/03/2013 ([facility]).  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  Comments:  Flow cytometry identified an abnormal low kappa light chain-restricted B cell population showing coexpression of CD20, CD5, and CD23 without significant expression of CD10, FMC7 or CD79b.  The abnormal B cells are of a predominantly small size and account for approximately 96.2% of the B cells and 37.0% of the total white blood cells.  Roberson-Giemsa stained cytospin preparation shows atypical lymphocytes of a predominantly small to intermediate size.  These findings are indicative of involvement by a mature small B cell neoplasm. The cytomorphologic features and immunophenotype of the neoplastic B cells are consistent with CLL/SLL.  A 1.9% subset of the CD5-positive B cells coexpresses CD38. Of note, in the majority of patients, expression of CD38 on less than 30% of CD5-positive CLL/SLL B cells has been associated with more favorable prognosis. FISH studies for CLL associated genetic abnormalities and IgVH hypermutation analysis by PCR are underway and will be reported separately. Correlation with morphological findings and close clinical followup are recommended.   Abdominal ultrasound done at [facility] on 04/12/2013. IMPRESSION:  1. Mobile gallstone within the lumen of the gallbladder. No gallbladder wall thickening or pericholecystic fluid. No biliary dilatation. Findings suggest cholelithiasis. 2. Echogenic appearance to the liver parenchyma may represent diffuse fatty infiltration. 3. Normal appearance to the kidneys, spleen, and visible portions of the pancreas.   CT of the abdomen and pelvis done on 03/30/2015 at [facility]. Impression: No CT evidence of neoplastic disease. No acute intra-abdominal/pelvic pathological process. Cholelithiasis. Colonic diverticulosis  without CT evidence of acute diverticulitis. Bilateral spondylolysis at L5. Mild spondylolisthesis not excluded.   Ultrasound of the abdomen done on 03/30/2015 at [facility]. Impression: Cholelithiasis. Steatosis of the liver.       PREVIOUS INTERVENTIONS:  Observation.      Other orders  -     Follow Anesthesia Guidelines / Protocol; Future  -     Follow Anesthesia Guidelines / Protocol; Standing  -     Verify / Perform Chlorhexidine Skin Prep; Standing  -     Verify / Perform Chlorhexidine Skin Prep if Indicated (If Not Already Completed); Standing  -     Obtain Informed Consent; Future  -     Provide NPO Instructions to Patient; Future  -     Chlorhexidine Skin Prep; Future  -     Notify physician (specify); Standing  -     Instructions on coughing, deep breathing, and incentive spirometry.; Standing  -     Oxygen Therapy-; Standing       Rico Enrique is a 58 y.o. male with biliary colic 2/2 cholelithiasis. History and imaging above are consistent with this diagnosis.  The symptoms including their nature, timing and duration are most consistent with biliary colic. I reviewed the gallbladder anatomy with the patient, and after a thorough discussion of risks (including bleeding, infection, bile leak, damage to surrounding structures including the common bile duct, and risks of anesthesia) and benefits, the patient wishes to proceed with laparoscopic robotic assisted cholecystectomy, possible cholangiogram.The patient has an appointment for pre-op work up including EKG, CXR, CMP and CBC. I contacted Dr. San and he does not recommend any DVT prophylaxis other than my pre-op heparin dose in light of his CLL.  The patient is currently scheduled for laparoscopic robotic assisted cholecystectomy, with ICG guided cholangiogram possible fluoroscopy on 4/19/24.     This is a chronic problem. I have reviewed the note, US and labs above. I have ordered CBC, cMP, CXR and EKG. I have discussed the patient with  Dr. San. He is at increased risk of perioperative complications 2/2 his elevated Bmi and CLL.     I also discussed with the patient post operative pain management including multimodal pain control utilizing Tylenol, ibuprofen, and Roxicodone for breakthrough pain. I will plan to give the patient 10 tabs of 5mg Roxicodone post operatively for breakthrough pain      Suad Padron MD  03/26/24  16:15 CDT

## 2024-04-01 NOTE — PROGRESS NOTES
[facility]  [address]  [place] [address] [phone]    Patient Name: Rico Enrique  Encounter Date: 04/15/2024  YOB: 1965  Patient Number: [id number]      REASON FOR FOLLOW-UP: Rico Enrique is a pleasant 58-year-old  male who is seen on followup for stage 0 chronic lymphocytic leukemia/small lymphocytic lymphoma (CLL/SLL), kappa light chain.  Patient is off treatment.  The patient is here alone.  History is obtained from the patient.  He is a reliable historian.          Problem List Items Addressed This Visit    None    Oncology/Hematology History Overview Note   DIAGNOSTIC ABNORMALITIES:  CBC from [facility] 02/05/2013 revealed a WBC of 15.03, hemoglobin 15.6, hematocrit 43.3, MCV 88.4, platelet count 300,000. Absolute lymphocyte count was elevated at 9.7. ANC was 4.7.  PATHOLOGY:  [facility], Fluorescence in-situ Hybridization (FISH ) report, 04/04/2013. 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 Deletion 13q is the most common genetic abnormality in CLL, found in 36-64% of cases. As the sole abnormality, it has a good prognostic value. Both the disease free interval and overall survival are better than in cases with a normal karyotype because of slow disease progression. Monitoring for deletion 13q may be useful in assessing the patient's remission/relapse status. No gene rearrangements characteristic for t(11;14) or abnormalities involving the target regions of chromosomes 6, 11, 12, or 17 were detected. However, it should be noted that the FISH probes utilized in this analysis cannot entirely exclude the presence of other chromosomal abnormalities. Correlation with classic cytogenetics, clinical, flow cytometric, and morphological  data is recommended, if available. 04/10/2013 JF  Peripheral blood, 04/03/2013 ([facility]).  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  Comments:  Flow cytometry identified an abnormal low kappa light chain-restricted B cell population showing coexpression of CD20, CD5, and CD23 without significant expression of CD10, FMC7 or CD79b.  The abnormal B cells are of a predominantly small size and account for approximately 96.2% of the B cells and 37.0% of the total white blood cells.  Roberson-Giemsa stained cytospin preparation shows atypical lymphocytes of a predominantly small to intermediate size.  These findings are indicative of involvement by a mature small B cell neoplasm. The cytomorphologic features and immunophenotype of the neoplastic B cells are consistent with CLL/SLL.  A 1.9% subset of the CD5-positive B cells coexpresses CD38. Of note, in the majority of patients, expression of CD38 on less than 30% of CD5-positive CLL/SLL B cells has been associated with more favorable prognosis. FISH studies for CLL associated genetic abnormalities and IgVH hypermutation analysis by PCR are underway and will be reported separately. Correlation with morphological findings and close clinical followup are recommended.   Abdominal ultrasound done at [facility] on 04/12/2013. IMPRESSION:  1. Mobile gallstone within the lumen of the gallbladder. No gallbladder wall thickening or pericholecystic fluid. No biliary dilatation. Findings suggest cholelithiasis. 2. Echogenic appearance to the liver parenchyma may represent diffuse fatty infiltration. 3. Normal appearance to the kidneys, spleen, and visible portions of the pancreas.   CT of the abdomen and pelvis done on 03/30/2015 at [facility]. Impression: No CT evidence of neoplastic disease. No acute intra-abdominal/pelvic pathological process. Cholelithiasis. Colonic  diverticulosis without CT evidence of acute diverticulitis. Bilateral spondylolysis at L5. Mild spondylolisthesis not excluded.   Ultrasound of the abdomen done on 03/30/2015 at [facility]. Impression: Cholelithiasis. Steatosis of the liver.       PREVIOUS INTERVENTIONS:  Observation.     CLL (chronic lymphocytic leukemia)   12/5/2019 Initial Diagnosis    CLL (chronic lymphocytic leukemia) (CMS/HCC)         PAST MEDICAL HISTORY:  ALLERGIES:  Allergies   Allergen Reactions    Flagyl [Metronidazole] Other (See Comments)     thrush       CURRENT MEDICATIONS:  Outpatient Encounter Medications as of 4/15/2024   Medication Sig Dispense Refill    fenofibrate (TRICOR) 145 MG tablet       lovastatin (MEVACOR) 20 MG tablet Take 2 tablets by mouth Every Night.      NIACIN FLUSH FREE 500 MG capsule TK 1 C PO QD  5    OMEGA-3 FATTY ACIDS PO Take  by mouth.      valsartan-hydrochlorothiazide (DIOVAN-HCT) 160-25 MG per tablet TK 1 T PO QD       No facility-administered encounter medications on file as of 4/15/2024.     ADULT ILLNESSES:  Patient Active Problem List   Diagnosis Code    Family hx colonic polyps Z83.719    Family hx of colon cancer Z80.0    Hx of colonic polyp Z86.010    Cervical lymphadenopathy R59.0    CLL (chronic lymphocytic leukemia) C91.10    Diverticulitis K57.92    Gallstones K80.20     SURGERIES:  Past Surgical History:   Procedure Laterality Date    COLONOSCOPY  02/15/2013    COLONOSCOPY N/A 3/19/2018    Procedure: COLONOSCOPY WITH ANESTHESIA;  Surgeon: Leonidas Ibanez MD;  Location: [facility] ENDOSCOPY;  Service: Gastroenterology    COLONOSCOPY N/A 2/27/2023    Procedure: COLONOSCOPY WITH ANESTHESIA;  Surgeon: Leonidas Ibanez MD;  Location: [facility] ENDOSCOPY;  Service: Gastroenterology;  Laterality: N/A;  Pre: hx polyp, diverticulitis, family hx colon ca  Post: diverticulitis  Lonny Simon MD    EYE SURGERY      TONSILLECTOMY AND ADENOIDECTOMY       HEALTH MAINTENANCE ITEMS:  Health  Maintenance Due   Topic Date Due    LIPID PANEL  Never done    Pneumococcal Vaccine 0-64 (1 of 2 - PCV) Never done    TDAP/TD VACCINES (1 - Tdap) Never done    HEPATITIS C SCREENING  Never done    ANNUAL PHYSICAL  Never done    RSV Vaccine - Adults (1 - 1-dose 60+ series) Never done    COVID-19 Vaccine (4 - 2023-24 season) 09/01/2023       <no information>  Last Completed Colonoscopy            COLORECTAL CANCER SCREENING (COLONOSCOPY - Every 10 Years) Next due on 2/27/2033 02/27/2023  COLONOSCOPY    02/27/2023  Surgical Procedure: COLONOSCOPY    03/19/2018  Surgical Procedure: COLONOSCOPY    03/19/2018  COLONOSCOPY    03/27/2017  Outside Claim: CHG BLOOD OCCULT,BY PEROXID,FECES,SINGLE, COLORECTAL SCREEN    Only the first 5 history entries have been loaded, but more history exists.                  Immunization History   Administered Date(s) Administered    COVID-19 (PFIZER) BIVALENT 12+YRS 10/20/2022    COVID-19 (PFIZER) Purple Cap Monovalent 08/26/2021, 09/27/2021     Last Completed Mammogram       This patient has no relevant Health Maintenance data.              FAMILY HISTORY:  Family History   Problem Relation Age of Onset    Colon cancer Maternal Aunt     Colon cancer Maternal Aunt     Colon polyps Maternal Uncle     Colon cancer Paternal Grandmother      SOCIAL HISTORY:  Social History     Socioeconomic History    Marital status:    Tobacco Use    Smoking status: Never    Smokeless tobacco: Never   Vaping Use    Vaping status: Never Used   Substance and Sexual Activity    Alcohol use: Yes     Comment: rare    Drug use: No    Sexual activity: Defer       REVIEW OF SYSTEMS:    Review of Systems   Constitutional:  Negative for fatigue, fever and unexpected weight loss.   HENT:  Negative for congestion and mouth sores.    Eyes:  Negative for discharge and redness.   Respiratory:  Negative for shortness of breath and wheezing.    Cardiovascular:  Negative for chest pain and palpitations.  "  Gastrointestinal:  Negative for abdominal pain, nausea and vomiting.   Endocrine: Negative for polydipsia and polyphagia.   Genitourinary:  Negative for difficulty urinating and dysuria.   Musculoskeletal:  Negative for gait problem and myalgias.   Skin:  Negative for pallor.   Allergic/Immunologic: Negative for food allergies.   Neurological:  Negative for speech difficulty and confusion.   Hematological:  Negative for adenopathy. Does not bruise/bleed easily.   Psychiatric/Behavioral:  Negative for agitation and depressed mood.          VITAL SIGNS: /68   Pulse 88   Temp 97.7 °F (36.5 °C)   Resp 18   Ht 186 cm (73.23\")   Wt 96.1 kg (211 lb 12.8 oz)   SpO2 97%   BMI 27.77 kg/m²  Body surface area is 2.21 meters squared.   Pain Score    04/15/24 0905   PainSc: 0-No pain           PHYSICAL EXAMINATION:     Physical Exam  Vitals reviewed.   Constitutional:       General: He is not in acute distress.  HENT:      Head: Normocephalic and atraumatic.   Eyes:      General: No scleral icterus.  Cardiovascular:      Rate and Rhythm: Normal rate.   Pulmonary:      Effort: No respiratory distress.      Breath sounds: No wheezing.   Abdominal:      General: Bowel sounds are normal.      Palpations: Abdomen is soft.      Tenderness: There is no abdominal tenderness.   Musculoskeletal:         General: No swelling.      Cervical back: Neck supple.   Skin:     Coloration: Skin is not jaundiced.   Neurological:      Mental Status: He is alert and oriented to person, place, and time.   Psychiatric:         Mood and Affect: Mood normal.         Behavior: Behavior normal.         Thought Content: Thought content normal.         Judgment: Judgment normal.         LABS    Lab Results - Last 18 Months   Lab Units 04/08/24  0709 10/09/23  0706 03/13/23  0706 12/19/22  0701 12/06/22  0854   HEMOGLOBIN g/dL 13.8 15.1 14.9 13.2 13.8   HEMATOCRIT % 43.9 46.0 46.8 42.7 43.9   MCV fL 96.9 97.5* 94.9 97.9* 97.6*   WBC 10*3/mm3 " "80.07* 76.58* 64.26* 72.43* 60.82*   RDW % 14.1 13.4 14.3 13.3 13.5   MPV fL 9.3 9.7 9.4 9.2 8.7   PLATELETS 10*3/mm3 276 196 203 288 344   NEUTROS ABS 10*3/mm3 7.21* 14.40* 5.85 5.14  --    EOS ABS 10*3/mm3  --   --   --  0.65*  --    NEUTROPHIL % % 9.0* 18.8* 9.1* 7.1*  --    MONOCYTES % % 1.0*  --   --   --   --    ATYP LYMPH % % 17.0*  --  2.0 0.9  --    ANISOCYTOSIS  Slight/1+ Slight/1+  --   --   --        Lab Results - Last 18 Months   Lab Units 04/08/24  0709 10/09/23  0706 03/13/23  0706   GLUCOSE mg/dL 123* 124* 116*   SODIUM mmol/L 139 143 139   POTASSIUM mmol/L 3.9 4.2 4.8   CO2 mmol/L 29.0 28.0 27.0   CHLORIDE mmol/L 100 102 103   ANION GAP mmol/L 10.0 13.0 9.0   CREATININE mg/dL 1.13 0.93 0.84   BUN mg/dL 23 16 25*   BUN / CREAT RATIO  20.4 17.2 29.8*   CALCIUM mg/dL 10.0 9.5 9.3   ALK PHOS U/L 92 113 112   TOTAL PROTEIN g/dL 7.2 6.9 6.9   ALT (SGPT) U/L 20 23 25   AST (SGOT) U/L 24 33 29   BILIRUBIN mg/dL 0.7 1.2 1.6*   ALBUMIN g/dL 4.4 4.5 4.6   GLOBULIN gm/dL 2.8 2.4 2.3       No results for input(s): \"MSPIKE\", \"KAPPALAMB\", \"IGLFLC\", \"URICACID\", \"FREEKAPPAL\", \"CEA\", \"LDH\", \"REFLABREPO\" in the last 66474 hours.    No results for input(s): \"IRON\", \"TIBC\", \"LABIRON\", \"FERRITIN\", \"F4BCULI\", \"TSH\", \"FOLATE\" in the last 69743 hours.    Invalid input(s): \"VITB12\"      Rico Enrique reports a pain score of 0.        ASSESSMENT:  1.   Chronic lymphocytic leukemia/small lymphocytic lymphoma (CLL/SLL) monoclonal kappa.  Stage 0.  Asymptomatic.  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  Complications: None.  Treatment status: Off therapy.  Prognosis: Good.  2.   Performance status of 0.  3.   Elevated liver function tests secondary to statin, cholelithiasis, and fatty liver.  4.   A 1.5 cm right level II node, followed by Dr. Hernandez.    5.   Elevated PSA. Followed by urology.  6.   History of hyperkalemia.  7.   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         "   PLAN:  1.    Re:   Heme status.  WBC 80.07 from 76.5 from 64.2 from 72.4, hemoglobin 13.8, hematocrit 43.9, MCV 96.9, platelet 276 from 196 from 203 from 288 and lymphocytes 72.06 from 62.26 from 58.4 from 66.7 from 58.8.  There is no rapid doubling.  2.    Re:  Pre-office CMP. Bilirubin 0.7 from 1.2 from 1.6 from 1.6 from 1.5.  GFR 74.4 from 94 mL/min.  3.    Re:  No interval CBC due on 7/2023. Seen by Dr. Padron 3/25/2024.  Plan for laparoscopic cholecystectomy. Routine prophylaxis per surgery. Chest xray negative 4/12/2024.   4.    Re:  Indiications for treatment of CLL/SLL like rapid doubling of lymphocytes, B symptoms, symptomatic or bulky adenopathies or recurrent infections requiring hospitalization with intravenous antibiotics.  5.    Re: Noncompliance with interval laboratory 7/2023.  6.    Re: Stable for observation, CLL/SLL.    7.   CBC with differential every 3 months.  8.   Plan of care discussed with patient.  Understanding expressed.  He is agreeable to proceed.  9.   Continue ongoing management per primary care physician and other specialists.  10.  Return to office in 6 months with pre-office CMP and CBC and differential.  11.  For laparoscopic cholecystectomy on 4/19/2024.          I have reviewed the assessment and plan and verified the accuracy of it. No changes to assessment and plan since the information was documented. Stanley San MD 04/15/24            I spent 31 total minutes, face-to-face, caring for Rico morgan. Greater than 50% of this time involved counseling and/or coordination of care as documented within this note.           cc:   Konstantin Fleming MD          (Suad Padron MD)          (Maggie Thompson MD)          (Fredy Waldrop MD)          (Yang Hernandez MD)          (Leonidas Ibanez MD)

## 2024-04-08 ENCOUNTER — LAB (OUTPATIENT)
Dept: LAB | Facility: HOSPITAL | Age: 59
End: 2024-04-08
Payer: COMMERCIAL

## 2024-04-08 DIAGNOSIS — C91.10 CLL (CHRONIC LYMPHOCYTIC LEUKEMIA): ICD-10-CM

## 2024-04-08 DIAGNOSIS — K80.20 GALLSTONES: ICD-10-CM

## 2024-04-08 LAB
ALBUMIN SERPL-MCNC: 4.4 G/DL (ref 3.5–5.2)
ALBUMIN/GLOB SERPL: 1.6 G/DL
ALP SERPL-CCNC: 92 U/L (ref 39–117)
ALT SERPL W P-5'-P-CCNC: 20 U/L (ref 1–41)
ANION GAP SERPL CALCULATED.3IONS-SCNC: 10 MMOL/L (ref 5–15)
ANISOCYTOSIS BLD QL: ABNORMAL
AST SERPL-CCNC: 24 U/L (ref 1–40)
BILIRUB SERPL-MCNC: 0.7 MG/DL (ref 0–1.2)
BUN SERPL-MCNC: 23 MG/DL (ref 8–23)
BUN/CREAT SERPL: 20.4 (ref 7–25)
CALCIUM SPEC-SCNC: 10 MG/DL (ref 8.6–10.5)
CHLORIDE SERPL-SCNC: 100 MMOL/L (ref 98–107)
CO2 SERPL-SCNC: 29 MMOL/L (ref 22–29)
CREAT SERPL-MCNC: 1.13 MG/DL (ref 0.76–1.27)
DEPRECATED RDW RBC AUTO: 49.8 FL (ref 37–54)
EGFRCR SERPLBLD CKD-EPI 2021: 74.4 ML/MIN/1.73
ERYTHROCYTE [DISTWIDTH] IN BLOOD BY AUTOMATED COUNT: 14.1 % (ref 12.3–15.4)
GLOBULIN UR ELPH-MCNC: 2.8 GM/DL
GLUCOSE SERPL-MCNC: 123 MG/DL (ref 65–99)
HCT VFR BLD AUTO: 43.9 % (ref 37.5–51)
HGB BLD-MCNC: 13.8 G/DL (ref 13–17.7)
LYMPHOCYTES # BLD MANUAL: 72.06 10*3/MM3 (ref 0.7–3.1)
LYMPHOCYTES NFR BLD MANUAL: 1 % (ref 5–12)
MCH RBC QN AUTO: 30.5 PG (ref 26.6–33)
MCHC RBC AUTO-ENTMCNC: 31.4 G/DL (ref 31.5–35.7)
MCV RBC AUTO: 96.9 FL (ref 79–97)
MONOCYTES # BLD: 0.8 10*3/MM3 (ref 0.1–0.9)
NEUTROPHILS # BLD AUTO: 7.21 10*3/MM3 (ref 1.7–7)
NEUTROPHILS NFR BLD MANUAL: 9 % (ref 42.7–76)
PLAT MORPH BLD: NORMAL
PLATELET # BLD AUTO: 276 10*3/MM3 (ref 140–450)
PMV BLD AUTO: 9.3 FL (ref 6–12)
POTASSIUM SERPL-SCNC: 3.9 MMOL/L (ref 3.5–5.2)
PROT SERPL-MCNC: 7.2 G/DL (ref 6–8.5)
RBC # BLD AUTO: 4.53 10*6/MM3 (ref 4.14–5.8)
SODIUM SERPL-SCNC: 139 MMOL/L (ref 136–145)
VARIANT LYMPHS NFR BLD MANUAL: 17 % (ref 0–5)
VARIANT LYMPHS NFR BLD MANUAL: 73 % (ref 19.6–45.3)
WBC MORPH BLD: NORMAL
WBC NRBC COR # BLD AUTO: 80.07 10*3/MM3 (ref 3.4–10.8)

## 2024-04-08 PROCEDURE — 85025 COMPLETE CBC W/AUTO DIFF WBC: CPT

## 2024-04-08 PROCEDURE — 80053 COMPREHEN METABOLIC PANEL: CPT

## 2024-04-08 PROCEDURE — 36415 COLL VENOUS BLD VENIPUNCTURE: CPT

## 2024-04-08 PROCEDURE — 85007 BL SMEAR W/DIFF WBC COUNT: CPT

## 2024-04-12 ENCOUNTER — PRE-ADMISSION TESTING (OUTPATIENT)
Dept: PREADMISSION TESTING | Facility: HOSPITAL | Age: 59
End: 2024-04-12
Payer: COMMERCIAL

## 2024-04-12 ENCOUNTER — HOSPITAL ENCOUNTER (OUTPATIENT)
Dept: GENERAL RADIOLOGY | Facility: HOSPITAL | Age: 59
Discharge: HOME | End: 2024-04-12
Payer: COMMERCIAL

## 2024-04-12 VITALS
WEIGHT: 214.51 LBS | RESPIRATION RATE: 16 BRPM | SYSTOLIC BLOOD PRESSURE: 133 MMHG | HEIGHT: 73 IN | HEART RATE: 83 BPM | OXYGEN SATURATION: 99 % | DIASTOLIC BLOOD PRESSURE: 85 MMHG | BODY MASS INDEX: 28.43 KG/M2

## 2024-04-12 DIAGNOSIS — K80.20 GALLSTONES: ICD-10-CM

## 2024-04-12 LAB
QT INTERVAL: 398 MS
QTC INTERVAL: 438 MS

## 2024-04-12 PROCEDURE — 71045 X-RAY EXAM CHEST 1 VIEW: CPT

## 2024-04-12 PROCEDURE — 93005 ELECTROCARDIOGRAM TRACING: CPT

## 2024-04-12 NOTE — DISCHARGE INSTRUCTIONS
Preparing for Surgery  Follow these instructions before the procedure:  Several days or weeks before your procedure      Ask your health care provider about:  Changing or stopping your regular medicines. This is especially important if you are taking diabetes medicines or blood thinners.  Taking medicines such as aspirin and ibuprofen. These medicines can thin your blood. Do not take these medicines unless your health care provider tells you to take them.  Taking over-the-counter medicines, vitamins, herbs, and supplements.    Contact your surgeon if you:  Develop a fever of more than 100.4°F (38°C) or other feelings of illness during the 48 hours before your surgery.  Have symptoms that get worse.  Have questions or concerns about your surgery.  If you are going home the same day of your surgery you will need to arrange for a responsible adult, age 18 years old or older, to drive you home from the hospital and stay with you for 24 hours. Verification of the  will be made prior to any procedure requiring sedation. You may not go home in a taxi or any form of public transportation by yourself.     Day before your procedure  Medication(s) you need to stop the day before your surgery: valsartan    24 hours before your procedure DO NOT drink alcoholic beverages or smoke.  24 hours before your procedure STOP taking Erectile Dysfunction medication (i.e.,Cialis, Viagra)   You may be asked to shower with a germ-killing soap.  Day of your procedure   You may take the following medication(s) the morning of surgery with a sip of water:  NONE      8 hours before your procedure STOP all food, any dairy products, and full liquids. This includes hard candy, chewing gum or mints. This is extremely important to prevent serious complications.   Up to 2 hours before your scheduled arrival time, you may have clear liquids no cream, powder, or pulp of any kind. Safe options are water, black coffee, plain tea, soda,  Gatorade/Powerade, clear broth, apple juice.  2 hours before your scheduled arrival time, STOP drinking clear liquids.  You may need to take another shower with a germ-killing soap before you leave home in the morning. Do not use perfumes, colognes, or body lotions.  Wear comfortable loose-fitting clothing.  Remove all jewelry including body piercing and rings, dark colored nail polish, and make up prior to arrival at the hospital. Leave all valuables at home.   Bring your hearing aids if you rely on them.  Do not wear contact lenses. If you wear eyeglasses remember to bring a case to store them in while you are in surgery.  Do not use denture adhesives since you will be asked to remove them during your surgery.    You do not need to bring your home medications into the hospital.   Bring your sleep apnea device with you on the day of your surgery (if this applies to you).  If you wear portable oxygen, bring it with you.   If you are staying overnight, you may bring a bag of items you may need such as slippers, robe and a change of clothes for your discharge. You may want to leave these items in the car until you are ready for them since your family will take your belongings when you leave the pre-operative area.  Arrive at the hospital as scheduled by the office. You will be asked to arrive 2 hours prior to your surgery time in order to prepare for your procedure.  When you arrive at the hospital  Go to the registration desk located at the main entrance of the hospital.  After registration is completed, you will be given a beeper and a sticker sheet. Take the stickers to Outpatient Surgery and place in the tray at the end of the desk to notify the staff that you have arrived and registered.   Return to the lobby to wait. You are not always called back according to the time of arrival but rather the time your doctor will be ready.  When your beeper lights up and vibrates proceed through the double doors, under the  stairs, and a member of the Outpatient Surgery staff will escort you to your preoperative room.   How to Use Chlorhexidine Before Surgery  Chlorhexidine gluconate (CHG) is a germ-killing (antiseptic) solution that is used to clean the skin. It can get rid of the bacteria that normally live on the skin and can keep them away for about 24 hours. To clean your skin with CHG, you may be given:  A CHG solution to use in the shower or as part of a sponge bath.  A prepackaged cloth that contains CHG.  Cleaning your skin with CHG may help lower the risk for infection:  While you are staying in the intensive care unit of the hospital.  If you have a vascular access, such as a central line, to provide short-term or long-term access to your veins.  If you have a catheter to drain urine from your bladder.  If you are on a ventilator. A ventilator is a machine that helps you breathe by moving air in and out of your lungs.  After surgery.  What are the risks?  Risks of using CHG include:  A skin reaction.  Hearing loss, if CHG gets in your ears and you have a perforated eardrum.  Eye injury, if CHG gets in your eyes and is not rinsed out.  The CHG product catching fire.  Make sure that you avoid smoking and flames after applying CHG to your skin.  Do not use CHG:  If you have a chlorhexidine allergy or have previously reacted to chlorhexidine.  On babies younger than 2 months of age.  How to use CHG solution  Use CHG only as told by your health care provider, and follow the instructions on the label.  Use the full amount of CHG as directed. Usually, this is one bottle.  During a shower    Follow these steps when using CHG solution during a shower (unless your health care provider gives you different instructions):  Start the shower.  Use your normal soap and shampoo to wash your face and hair.  Turn off the shower or move out of the shower stream.  Pour the CHG onto a clean washcloth. Do not use any type of brush or rough-edged  sponge.  Starting at your neck, lather your body down to your toes. Make sure you follow these instructions:  If you will be having surgery, pay special attention to the part of your body where you will be having surgery. Scrub this area for at least 1 minute.  Do not use CHG on your head or face. If the solution gets into your ears or eyes, rinse them well with water.  Avoid your genital area.  Avoid any areas of skin that have broken skin, cuts, or scrapes.  Scrub your back and under your arms. Make sure to wash skin folds.  Let the lather sit on your skin for 1-2 minutes or as long as told by your health care provider.  Thoroughly rinse your entire body in the shower. Make sure that all body creases and crevices are rinsed well.  Dry off with a clean towel. Do not put any substances on your body afterward--such as powder, lotion, or perfume--unless you are told to do so by your health care provider. Only use lotions that are recommended by the .  Put on clean clothes or pajamas.  If it is the night before your surgery, sleep in clean sheets.     During a sponge bath  Follow these steps when using CHG solution during a sponge bath (unless your health care provider gives you different instructions):  Use your normal soap and shampoo to wash your face and hair.  Pour the CHG onto a clean washcloth.  Starting at your neck, lather your body down to your toes. Make sure you follow these instructions:  If you will be having surgery, pay special attention to the part of your body where you will be having surgery. Scrub this area for at least 1 minute.  Do not use CHG on your head or face. If the solution gets into your ears or eyes, rinse them well with water.  Avoid your genital area.  Avoid any areas of skin that have broken skin, cuts, or scrapes.  Scrub your back and under your arms. Make sure to wash skin folds.  Let the lather sit on your skin for 1-2 minutes or as long as told by your health care  provider.  Using a different clean, wet washcloth, thoroughly rinse your entire body. Make sure that all body creases and crevices are rinsed well.  Dry off with a clean towel. Do not put any substances on your body afterward--such as powder, lotion, or perfume--unless you are told to do so by your health care provider. Only use lotions that are recommended by the .  Put on clean clothes or pajamas.  If it is the night before your surgery, sleep in clean sheets.  How to use CHG prepackaged cloths  Only use CHG cloths as told by your health care provider, and follow the instructions on the label.  Use the CHG cloth on clean, dry skin.  Do not use the CHG cloth on your head or face unless your health care provider tells you to.  When washing with the CHG cloth:  Avoid your genital area.  Avoid any areas of skin that have broken skin, cuts, or scrapes.  Before surgery    Follow these steps when using a CHG cloth to clean before surgery (unless your health care provider gives you different instructions):  Using the CHG cloth, vigorously scrub the part of your body where you will be having surgery. Scrub using a back-and-forth motion for 3 minutes. The area on your body should be completely wet with CHG when you are done scrubbing.  Do not rinse. Discard the cloth and let the area air-dry. Do not put any substances on the area afterward, such as powder, lotion, or perfume.  Put on clean clothes or pajamas.  If it is the night before your surgery, sleep in clean sheets.     For general bathing  Follow these steps when using CHG cloths for general bathing (unless your health care provider gives you different instructions).  Use a separate CHG cloth for each area of your body. Make sure you wash between any folds of skin and between your fingers and toes. Wash your body in the following order, switching to a new cloth after each step:  The front of your neck, shoulders, and chest.  Both of your arms, under your  arms, and your hands.  Your stomach and groin area, avoiding the genitals.  Your right leg and foot.  Your left leg and foot.  The back of your neck, your back, and your buttocks.  Do not rinse. Discard the cloth and let the area air-dry. Do not put any substances on your body afterward--such as powder, lotion, or perfume--unless you are told to do so by your health care provider. Only use lotions that are recommended by the .  Put on clean clothes or pajamas.  Contact a health care provider if:  Your skin gets irritated after scrubbing.  You have questions about using your solution or cloth.  You swallow any chlorhexidine. Call your local poison control center (1-984.509.7892 in the [place]  Get help right away if:  Your eyes itch badly, or they become very red or swollen.  Your skin itches badly and is red or swollen.  Your hearing changes.  You have trouble seeing.  You have swelling or tingling in your mouth or throat.  You have trouble breathing.  These symptoms may represent a serious problem that is an emergency. Do not wait to see if the symptoms will go away. Get medical help right away. Call your local emergency services (750 in the [place] Do not drive yourself to the hospital.  Summary  Chlorhexidine gluconate (CHG) is a germ-killing (antiseptic) solution that is used to clean the skin. Cleaning your skin with CHG may help to lower your risk for infection.  You may be given CHG to use for bathing. It may be in a bottle or in a prepackaged cloth to use on your skin. Carefully follow your health care provider's instructions and the instructions on the product label.  Do not use CHG if you have a chlorhexidine allergy.  Contact your health care provider if your skin gets irritated after scrubbing.  This information is not intended to replace advice given to you by your health care provider. Make sure you discuss any questions you have with your health care provider.  Document Revised: 04/17/2023  Document Reviewed: 02/28/2022  Elsevier Patient Education © 2023 [employer]

## 2024-04-15 ENCOUNTER — OFFICE VISIT (OUTPATIENT)
Dept: ONCOLOGY | Facility: CLINIC | Age: 59
End: 2024-04-15
Payer: COMMERCIAL

## 2024-04-15 VITALS
WEIGHT: 211.8 LBS | OXYGEN SATURATION: 97 % | RESPIRATION RATE: 18 BRPM | HEART RATE: 88 BPM | TEMPERATURE: 97.7 F | SYSTOLIC BLOOD PRESSURE: 120 MMHG | DIASTOLIC BLOOD PRESSURE: 68 MMHG | BODY MASS INDEX: 28.07 KG/M2 | HEIGHT: 73 IN

## 2024-04-15 DIAGNOSIS — C91.10 CLL (CHRONIC LYMPHOCYTIC LEUKEMIA): Primary | ICD-10-CM

## 2024-04-15 PROCEDURE — 99214 OFFICE O/P EST MOD 30 MIN: CPT | Performed by: INTERNAL MEDICINE

## 2024-04-19 ENCOUNTER — HOSPITAL ENCOUNTER (OUTPATIENT)
Facility: HOSPITAL | Age: 59
Setting detail: HOSPITAL OUTPATIENT SURGERY
Discharge: HOME | End: 2024-04-19
Attending: STUDENT IN AN ORGANIZED HEALTH CARE EDUCATION/TRAINING PROGRAM | Admitting: STUDENT IN AN ORGANIZED HEALTH CARE EDUCATION/TRAINING PROGRAM
Payer: COMMERCIAL

## 2024-04-19 ENCOUNTER — ANESTHESIA EVENT (OUTPATIENT)
Dept: PERIOP | Facility: HOSPITAL | Age: 59
End: 2024-04-19
Payer: COMMERCIAL

## 2024-04-19 ENCOUNTER — ANESTHESIA (OUTPATIENT)
Dept: PERIOP | Facility: HOSPITAL | Age: 59
End: 2024-04-19
Payer: COMMERCIAL

## 2024-04-19 VITALS
DIASTOLIC BLOOD PRESSURE: 79 MMHG | SYSTOLIC BLOOD PRESSURE: 121 MMHG | RESPIRATION RATE: 16 BRPM | OXYGEN SATURATION: 92 % | HEART RATE: 66 BPM | TEMPERATURE: 97.6 F

## 2024-04-19 DIAGNOSIS — K80.20 GALLSTONES: ICD-10-CM

## 2024-04-19 PROCEDURE — 25010000002 LIDOCAINE 1 % SOLUTION 20 ML VIAL: Performed by: STUDENT IN AN ORGANIZED HEALTH CARE EDUCATION/TRAINING PROGRAM

## 2024-04-19 PROCEDURE — 25010000002 MORPHINE SULFATE (PF) 2 MG/ML SOLUTION 1 ML CARTRIDGE: Performed by: STUDENT IN AN ORGANIZED HEALTH CARE EDUCATION/TRAINING PROGRAM

## 2024-04-19 PROCEDURE — 25010000002 PROPOFOL 10 MG/ML EMULSION: Performed by: NURSE ANESTHETIST, CERTIFIED REGISTERED

## 2024-04-19 PROCEDURE — 25010000002 HEPARIN (PORCINE) PER 1000 UNITS: Performed by: STUDENT IN AN ORGANIZED HEALTH CARE EDUCATION/TRAINING PROGRAM

## 2024-04-19 PROCEDURE — 25010000002 SUGAMMADEX 200 MG/2ML SOLUTION: Performed by: NURSE ANESTHETIST, CERTIFIED REGISTERED

## 2024-04-19 PROCEDURE — 25810000003 SODIUM CHLORIDE PER 500 ML: Performed by: STUDENT IN AN ORGANIZED HEALTH CARE EDUCATION/TRAINING PROGRAM

## 2024-04-19 PROCEDURE — 88304 TISSUE EXAM BY PATHOLOGIST: CPT | Performed by: STUDENT IN AN ORGANIZED HEALTH CARE EDUCATION/TRAINING PROGRAM

## 2024-04-19 PROCEDURE — 25010000002 DEXAMETHASONE PER 1 MG: Performed by: STUDENT IN AN ORGANIZED HEALTH CARE EDUCATION/TRAINING PROGRAM

## 2024-04-19 PROCEDURE — 25810000003 LACTATED RINGERS PER 1000 ML: Performed by: STUDENT IN AN ORGANIZED HEALTH CARE EDUCATION/TRAINING PROGRAM

## 2024-04-19 PROCEDURE — 25010000002 FENTANYL CITRATE (PF) 50 MCG/ML SOLUTION: Performed by: NURSE ANESTHETIST, CERTIFIED REGISTERED

## 2024-04-19 PROCEDURE — 25010000002 BUPIVACAINE (PF) 0.25 % SOLUTION 30 ML VIAL: Performed by: STUDENT IN AN ORGANIZED HEALTH CARE EDUCATION/TRAINING PROGRAM

## 2024-04-19 PROCEDURE — 47563 LAPARO CHOLECYSTECTOMY/GRAPH: CPT | Performed by: STUDENT IN AN ORGANIZED HEALTH CARE EDUCATION/TRAINING PROGRAM

## 2024-04-19 PROCEDURE — 25010000002 BUPIVACAINE 0.5 % SOLUTION 50 ML VIAL: Performed by: STUDENT IN AN ORGANIZED HEALTH CARE EDUCATION/TRAINING PROGRAM

## 2024-04-19 PROCEDURE — 25010000002 CEFAZOLIN PER 500 MG: Performed by: STUDENT IN AN ORGANIZED HEALTH CARE EDUCATION/TRAINING PROGRAM

## 2024-04-19 PROCEDURE — 47563 LAPARO CHOLECYSTECTOMY/GRAPH: CPT

## 2024-04-19 PROCEDURE — 25010000002 MIDAZOLAM PER 1 MG: Performed by: ANESTHESIOLOGY

## 2024-04-19 DEVICE — LARGE LIGATION CLIPS 6 CLIPS/CART
Type: IMPLANTABLE DEVICE | Site: ABDOMEN | Status: FUNCTIONAL
Brand: VAS-Q-CLIP

## 2024-04-19 RX ORDER — SODIUM CHLORIDE, SODIUM LACTATE, POTASSIUM CHLORIDE, CALCIUM CHLORIDE 600; 310; 30; 20 MG/100ML; MG/100ML; MG/100ML; MG/100ML
1000 INJECTION, SOLUTION INTRAVENOUS CONTINUOUS
Status: DISCONTINUED | OUTPATIENT
Start: 2024-04-19 | End: 2024-04-19 | Stop reason: HOSPADM

## 2024-04-19 RX ORDER — HEPARIN SODIUM 5000 [USP'U]/ML
5000 INJECTION, SOLUTION INTRAVENOUS; SUBCUTANEOUS ONCE
Status: COMPLETED | OUTPATIENT
Start: 2024-04-19 | End: 2024-04-19

## 2024-04-19 RX ORDER — NALOXONE HCL 0.4 MG/ML
0.4 VIAL (ML) INJECTION AS NEEDED
Status: DISCONTINUED | OUTPATIENT
Start: 2024-04-19 | End: 2024-04-19 | Stop reason: HOSPADM

## 2024-04-19 RX ORDER — ONDANSETRON 4 MG/1
4 TABLET, FILM COATED ORAL EVERY 8 HOURS PRN
Qty: 15 TABLET | Refills: 0 | Status: SHIPPED | OUTPATIENT
Start: 2024-04-19 | End: 2025-04-19

## 2024-04-19 RX ORDER — SODIUM CHLORIDE 0.9 % (FLUSH) 0.9 %
3-10 SYRINGE (ML) INJECTION AS NEEDED
Status: DISCONTINUED | OUTPATIENT
Start: 2024-04-19 | End: 2024-04-19 | Stop reason: HOSPADM

## 2024-04-19 RX ORDER — FENTANYL CITRATE 50 UG/ML
50 INJECTION, SOLUTION INTRAMUSCULAR; INTRAVENOUS
Status: DISCONTINUED | OUTPATIENT
Start: 2024-04-19 | End: 2024-04-19 | Stop reason: HOSPADM

## 2024-04-19 RX ORDER — FENTANYL CITRATE 50 UG/ML
INJECTION, SOLUTION INTRAMUSCULAR; INTRAVENOUS AS NEEDED
Status: DISCONTINUED | OUTPATIENT
Start: 2024-04-19 | End: 2024-04-19 | Stop reason: SURG

## 2024-04-19 RX ORDER — LIDOCAINE HYDROCHLORIDE 20 MG/ML
INJECTION, SOLUTION EPIDURAL; INFILTRATION; INTRACAUDAL; PERINEURAL AS NEEDED
Status: DISCONTINUED | OUTPATIENT
Start: 2024-04-19 | End: 2024-04-19 | Stop reason: SURG

## 2024-04-19 RX ORDER — SODIUM CHLORIDE 0.9 % (FLUSH) 0.9 %
3 SYRINGE (ML) INJECTION AS NEEDED
Status: DISCONTINUED | OUTPATIENT
Start: 2024-04-19 | End: 2024-04-19 | Stop reason: HOSPADM

## 2024-04-19 RX ORDER — DROPERIDOL 2.5 MG/ML
0.62 INJECTION, SOLUTION INTRAMUSCULAR; INTRAVENOUS ONCE AS NEEDED
Status: DISCONTINUED | OUTPATIENT
Start: 2024-04-19 | End: 2024-04-19 | Stop reason: HOSPADM

## 2024-04-19 RX ORDER — ROCURONIUM BROMIDE 10 MG/ML
INJECTION, SOLUTION INTRAVENOUS AS NEEDED
Status: DISCONTINUED | OUTPATIENT
Start: 2024-04-19 | End: 2024-04-19 | Stop reason: SURG

## 2024-04-19 RX ORDER — IBUPROFEN 600 MG/1
600 TABLET ORAL EVERY 6 HOURS PRN
Status: DISCONTINUED | OUTPATIENT
Start: 2024-04-19 | End: 2024-04-19 | Stop reason: HOSPADM

## 2024-04-19 RX ORDER — PROPOFOL 10 MG/ML
VIAL (ML) INTRAVENOUS AS NEEDED
Status: DISCONTINUED | OUTPATIENT
Start: 2024-04-19 | End: 2024-04-19 | Stop reason: SURG

## 2024-04-19 RX ORDER — SODIUM CHLORIDE 0.9 % (FLUSH) 0.9 %
3 SYRINGE (ML) INJECTION EVERY 12 HOURS SCHEDULED
Status: DISCONTINUED | OUTPATIENT
Start: 2024-04-19 | End: 2024-04-19 | Stop reason: HOSPADM

## 2024-04-19 RX ORDER — ONDANSETRON 2 MG/ML
4 INJECTION INTRAMUSCULAR; INTRAVENOUS ONCE AS NEEDED
Status: DISCONTINUED | OUTPATIENT
Start: 2024-04-19 | End: 2024-04-19 | Stop reason: HOSPADM

## 2024-04-19 RX ORDER — LABETALOL HYDROCHLORIDE 5 MG/ML
5 INJECTION, SOLUTION INTRAVENOUS
Status: DISCONTINUED | OUTPATIENT
Start: 2024-04-19 | End: 2024-04-19 | Stop reason: HOSPADM

## 2024-04-19 RX ORDER — ACETAMINOPHEN 500 MG
1000 TABLET ORAL ONCE
Status: COMPLETED | OUTPATIENT
Start: 2024-04-19 | End: 2024-04-19

## 2024-04-19 RX ORDER — LIDOCAINE HYDROCHLORIDE 40 MG/ML
SOLUTION TOPICAL AS NEEDED
Status: DISCONTINUED | OUTPATIENT
Start: 2024-04-19 | End: 2024-04-19 | Stop reason: SURG

## 2024-04-19 RX ORDER — ACETAMINOPHEN 325 MG/1
975 TABLET ORAL EVERY 8 HOURS
Start: 2024-04-19 | End: 2025-04-19

## 2024-04-19 RX ORDER — HYDROCODONE BITARTRATE AND ACETAMINOPHEN 5; 325 MG/1; MG/1
1 TABLET ORAL EVERY 4 HOURS PRN
Status: DISCONTINUED | OUTPATIENT
Start: 2024-04-19 | End: 2024-04-19 | Stop reason: HOSPADM

## 2024-04-19 RX ORDER — SODIUM CHLORIDE 9 MG/ML
INJECTION, SOLUTION INTRAVENOUS AS NEEDED
Status: DISCONTINUED | OUTPATIENT
Start: 2024-04-19 | End: 2024-04-19 | Stop reason: HOSPADM

## 2024-04-19 RX ORDER — SODIUM CHLORIDE, SODIUM LACTATE, POTASSIUM CHLORIDE, CALCIUM CHLORIDE 600; 310; 30; 20 MG/100ML; MG/100ML; MG/100ML; MG/100ML
100 INJECTION, SOLUTION INTRAVENOUS CONTINUOUS
Status: DISCONTINUED | OUTPATIENT
Start: 2024-04-19 | End: 2024-04-19 | Stop reason: HOSPADM

## 2024-04-19 RX ORDER — SODIUM CHLORIDE 0.9 % (FLUSH) 0.9 %
10 SYRINGE (ML) INJECTION AS NEEDED
Status: DISCONTINUED | OUTPATIENT
Start: 2024-04-19 | End: 2024-04-19 | Stop reason: HOSPADM

## 2024-04-19 RX ORDER — OXYCODONE HYDROCHLORIDE 5 MG/1
5 TABLET ORAL EVERY 8 HOURS PRN
Qty: 10 TABLET | Refills: 0 | Status: SHIPPED | OUTPATIENT
Start: 2024-04-19 | End: 2025-04-19

## 2024-04-19 RX ORDER — MIDAZOLAM HYDROCHLORIDE 1 MG/ML
1 INJECTION INTRAMUSCULAR; INTRAVENOUS
Status: DISCONTINUED | OUTPATIENT
Start: 2024-04-19 | End: 2024-04-19 | Stop reason: HOSPADM

## 2024-04-19 RX ORDER — SODIUM CHLORIDE 9 MG/ML
40 INJECTION, SOLUTION INTRAVENOUS AS NEEDED
Status: DISCONTINUED | OUTPATIENT
Start: 2024-04-19 | End: 2024-04-19 | Stop reason: HOSPADM

## 2024-04-19 RX ORDER — LIDOCAINE HYDROCHLORIDE 10 MG/ML
0.5 INJECTION, SOLUTION EPIDURAL; INFILTRATION; INTRACAUDAL; PERINEURAL ONCE AS NEEDED
Status: DISCONTINUED | OUTPATIENT
Start: 2024-04-19 | End: 2024-04-19 | Stop reason: HOSPADM

## 2024-04-19 RX ORDER — INDOCYANINE GREEN AND WATER 25 MG
3.75 KIT INJECTION ONCE
Status: DISCONTINUED | OUTPATIENT
Start: 2024-04-19 | End: 2024-04-19

## 2024-04-19 RX ORDER — FLUMAZENIL 0.1 MG/ML
0.2 INJECTION INTRAVENOUS AS NEEDED
Status: DISCONTINUED | OUTPATIENT
Start: 2024-04-19 | End: 2024-04-19 | Stop reason: HOSPADM

## 2024-04-19 RX ORDER — HYDROCODONE BITARTRATE AND ACETAMINOPHEN 10; 325 MG/1; MG/1
1 TABLET ORAL EVERY 4 HOURS PRN
Status: DISCONTINUED | OUTPATIENT
Start: 2024-04-19 | End: 2024-04-19 | Stop reason: HOSPADM

## 2024-04-19 RX ADMIN — SODIUM CHLORIDE, POTASSIUM CHLORIDE, SODIUM LACTATE AND CALCIUM CHLORIDE 1000 ML: 600; 310; 30; 20 INJECTION, SOLUTION INTRAVENOUS at 17:03

## 2024-04-19 RX ADMIN — ROCURONIUM BROMIDE 30 MG: 10 INJECTION, SOLUTION INTRAVENOUS at 16:09

## 2024-04-19 RX ADMIN — FENTANYL CITRATE 250 MCG: 50 INJECTION, SOLUTION INTRAMUSCULAR; INTRAVENOUS at 16:09

## 2024-04-19 RX ADMIN — ACETAMINOPHEN 1000 MG: 500 TABLET, FILM COATED ORAL at 13:15

## 2024-04-19 RX ADMIN — LIDOCAINE HYDROCHLORIDE 1 EACH: 40 SOLUTION TOPICAL at 16:09

## 2024-04-19 RX ADMIN — HEPARIN SODIUM 5000 UNITS: 5000 INJECTION INTRAVENOUS; SUBCUTANEOUS at 13:00

## 2024-04-19 RX ADMIN — CEFAZOLIN 2 G: 2 INJECTION, POWDER, FOR SOLUTION INTRAMUSCULAR; INTRAVENOUS at 16:15

## 2024-04-19 RX ADMIN — HYDROCODONE BITARTRATE AND ACETAMINOPHEN 1 TABLET: 5; 325 TABLET ORAL at 17:20

## 2024-04-19 RX ADMIN — SUGAMMADEX 200 MG: 100 INJECTION, SOLUTION INTRAVENOUS at 16:46

## 2024-04-19 RX ADMIN — PROPOFOL INJECTABLE EMULSION 120 MG: 10 INJECTION, EMULSION INTRAVENOUS at 16:09

## 2024-04-19 RX ADMIN — SODIUM CHLORIDE, POTASSIUM CHLORIDE, SODIUM LACTATE AND CALCIUM CHLORIDE 1000 ML: 600; 310; 30; 20 INJECTION, SOLUTION INTRAVENOUS at 10:40

## 2024-04-19 RX ADMIN — LIDOCAINE HYDROCHLORIDE 100 MG: 20 INJECTION, SOLUTION EPIDURAL; INFILTRATION; INTRACAUDAL; PERINEURAL at 16:09

## 2024-04-19 RX ADMIN — MIDAZOLAM HYDROCHLORIDE 1 MG: 1 INJECTION, SOLUTION INTRAMUSCULAR; INTRAVENOUS at 13:15

## 2024-04-19 NOTE — OP NOTE
Laparoscopic Robotic Assisted Cholecystectomy with ICG guided Cholangiogram Operative Report:     Patient: Rico Enrique MRN: [id number]    YOB: 1965  Age: 58 y.o.  Sex: male   Unit: [facility] OR Room/Bed: P[facility] OR/MAIN OR Location: [facility]    Admitting Physician: VICENTE FERNANDEZ    Primary Care Physician: Lonny Simon MD             INDICATIONS: This is a 58 y.o. male who presents with biliary colic as indication for laparoscopic cholecystectomy. After a discussion of risks, benefits and alternatives, consent was obtained.     DATE OF OPERATION: 4/19/2024     Surgeons and Role:     * Vicente Fernandez MD - Primary  Aaliyah Thompson PA-C - Assist    ANESTHESIA: General     PREOPERATIVE DIAGNOSIS: Gallstones [K80.20]    POSTOPERATIVE DIAGNOSIS: Same    PROCEDURES PERFORMED:  Laparoscopic Robotic assisted Cholecystectomy with ICG guided cholangiogram without fluoroscopy     PROCEDURE DETAILS:   Preoperative ICG, antibiotics and DVT ppx were given. The patient was brought into the OR and placed in the supine position.  General anesthesia was induced.  The patient's abdomen was prepped and draped in the usual sterile fashion.  A time out was performed verifying the patient and the procedure. An 8 mm incision was made along the left subcostal margin and a Veress needle inserted.  The abdomen was inflated to 15 mmHg with CO2 gas. An 8 mm trocar was placed followed by the robotic camera.  A laparoscopic TAP block was performed with my deep local. Three additional 8 mm trocars were placed in an oblique line from left upper quadrant to right lower quadrant.  The patient was placed in slight reversed Trendelenburg position with right side up. The robot was docked. With the tip up, ProGrasp and hook, the head of the gallbladder was grasped and retracted over the dome of the liver.  The infundibulum was also grasped and retracted to the patient's right side, opening up the triangle of Calot. The hook  was used to open the fat overlying the neck of the gallbladder. The cystic artery and cystic duct were clearly visualized. The critical view of safety was obtained and the confluence of the common bile duct to the common hepatic and cystic ducts was visualized.  The Firefly view was turned on and the anatomy was confirmed with the pre-operatively injected ICG.  The cystic duct was double clipped proximally, single clipped distally, and divided. The cystic artery was single clipped proximally, single clipped distally and divided. The gallbladder was removed from the undersurface of the liver with electrocautery.  The gallbladder fossa demonstrated no signs of bleeding or leakage of bile. The robot was undocked.  The left subcostal trocar was removed and the Endo Catch bag was inserted through this incision. The gallbladder was placed in an Endo Catch bag and removed. The abdomen was desufflated and trocars removed. The extraction trocar site fascia was closed with an 0 Vicryl on a UR6.  The skin was closed with 4-0 Monocryl followed by skin glue.  The patient tolerated the procedure well, was extubated in the OR and transferred to the PACU in stable condition    Aaliyah Thompson PA-C was responsible for performing the following activities: Retraction, Suction, Irrigation, Suturing, Closing, Placing Dressing, and exchange of robotic instruments  and their skilled assistance was necessary for the success of this case.      Findings: gallstones   Estimated Blood Loss:  20 mL   Complications: none apparent            Specimens: Gallbladder and contents     Disposition: PACU - hemodynamically stable.           Condition: stable    Suad Padron MD  04/19/24

## 2024-04-19 NOTE — ANESTHESIA PREPROCEDURE EVALUATION
Anesthesia Evaluation     no history of anesthetic complications:   NPO Solid Status: > 8 hours  NPO Liquid Status: > 8 hours           Airway   Mallampati: I  TM distance: >3 FB  No difficulty expected  Dental      Pulmonary    (-) sleep apnea, not a smoker  Cardiovascular   Exercise tolerance: good (4-7 METS)    (+) hypertension, hyperlipidemia  (-) CAD      Neuro/Psych  (-) seizures, TIA, CVA  GI/Hepatic/Renal/Endo    (+) liver disease fatty liver disease  (-) no renal disease, diabetes    Musculoskeletal     Abdominal    Substance History      OB/GYN          Other   blood dyscrasia (CLL),                 Anesthesia Plan    ASA 2     general     intravenous induction     Anesthetic plan, risks, benefits, and alternatives have been provided, discussed and informed consent has been obtained with: patient.    CODE STATUS:

## 2024-04-19 NOTE — ANESTHESIA PROCEDURE NOTES
Airway  Urgency: elective    Date/Time: 4/19/2024 4:09 PM  Airway not difficult    General Information and Staff    Patient location during procedure: OR  CRNA/CAA: To Mitchell CRNA    Indications and Patient Condition  Indications for airway management: airway protection    Preoxygenated: yes  Mask difficulty assessment: 1 - vent by mask    Final Airway Details  Final airway type: endotracheal airway      Successful airway: ETT  Cuffed: yes   Successful intubation technique: direct laryngoscopy  Facilitating devices/methods: intubating stylet  Endotracheal tube insertion site: oral  Blade: Roman  Blade size: 2  ETT size (mm): 7.5  Cormack-Lehane Classification: grade I - full view of glottis  Placement verified by: chest auscultation and capnometry   Cuff volume (mL): 12  Measured from: teeth  ETT/EBT  to teeth (cm): 21  Number of attempts at approach: 1  Assessment: lips, teeth, and gum same as pre-op and atraumatic intubation

## 2024-04-19 NOTE — DISCHARGE INSTRUCTIONS
Wound:   - you have skin glue on your incisions. Okay to shower tomorrow.   - Leave skin glue in place, it should slowly fall off over 2 weeks   - No swimming/soaking/bathing x 2 weeks to allow incisions to heal.     Activity:   - Activity as tolerated. NO heavy lifting x 2 weeks - no more than 25lb at a time.   - No driving or operating machinery on narcotic pain medication.     Pain medication:   - Take 1000mg of tylenol every 8 hours for 3 days. After three days, take it prn.   - You have a prescription for a narcotic. It will be roxicodone 5mg tabs. Take these only as needed after you have taken the tylenol. If you are taking the roxicodone, make sure to take a stool softener (colace) with it as it can cause constipation.   - The narcotic may make you nauseated, you will have a prescription for zofran in case of nausea.     Follow up:   - make an appointment to see me in 2 weeks  - If you have any concerns before then, call me office at [phone]

## 2024-04-20 NOTE — ANESTHESIA POSTPROCEDURE EVALUATION
Patient: Rico Enrique    Procedure Summary       Date: 04/19/24 Room / Location:  [facility] OR 06 /  [facility] OR    Anesthesia Start: 1605 Anesthesia Stop: 1651    Procedure: LAPAROSCOPIC ROBOTIC ASSISTED CHOLECYSTECTOMY WTIH PRE-OPERATIVE INDOCYANINE GREEN INJECTION (Abdomen) Diagnosis:       Gallstones      (Gallstones [K80.20])    Surgeons: Suad Padron MD Provider: Ney Parsons CRNA    Anesthesia Type: general ASA Status: 2            Anesthesia Type: general    Vitals  Vitals Value Taken Time   /77 04/19/24 1726   Temp 97.6 °F (36.4 °C) 04/19/24 1720   Pulse 74 04/19/24 1727   Resp 11 04/19/24 1720   SpO2 97 % 04/19/24 1727   Vitals shown include unfiled device data.        Post Anesthesia Care and Evaluation    Patient location during evaluation: PACU  Level of consciousness: awake  Pain management: adequate    Airway patency: patent  Anesthetic complications: No anesthetic complications  PONV Status: none  Cardiovascular status: acceptable  Respiratory status: acceptable  Hydration status: acceptable  No anesthesia care post op

## 2024-04-22 LAB
QT INTERVAL: 398 MS
QTC INTERVAL: 438 MS

## 2024-04-23 LAB
LAB AP CASE REPORT: NORMAL
Lab: NORMAL
PATH REPORT.FINAL DX SPEC: NORMAL
PATH REPORT.GROSS SPEC: NORMAL

## 2024-05-08 ENCOUNTER — TELEPHONE (OUTPATIENT)
Dept: ONCOLOGY | Facility: CLINIC | Age: 59
End: 2024-05-08

## 2024-05-08 NOTE — TELEPHONE ENCOUNTER
Caller: Rico Enrique    Relationship to patient: Self    Best call back number: [phone]     Chief complaint: PT NEEDS DIFFERENT DATE DUE TO BEING OUT OF TOWN.     Type of visit: LAB    Requested date: 07/15/24 OR ANY MONDAY, BUT NEEDS EARLIEST TIME    If rescheduling, when is the original appointment: 07/08/24

## 2024-05-09 ENCOUNTER — OFFICE VISIT (OUTPATIENT)
Dept: SURGERY | Facility: CLINIC | Age: 59
End: 2024-05-09
Payer: COMMERCIAL

## 2024-05-09 VITALS
BODY MASS INDEX: 27.57 KG/M2 | DIASTOLIC BLOOD PRESSURE: 71 MMHG | OXYGEN SATURATION: 96 % | SYSTOLIC BLOOD PRESSURE: 112 MMHG | WEIGHT: 208 LBS | HEART RATE: 92 BPM | HEIGHT: 73 IN

## 2024-05-09 DIAGNOSIS — Z90.49 S/P LAPAROSCOPIC CHOLECYSTECTOMY: Primary | ICD-10-CM

## 2024-05-09 DIAGNOSIS — K80.20 GALLSTONES: ICD-10-CM

## 2024-05-09 PROCEDURE — 99024 POSTOP FOLLOW-UP VISIT: CPT

## 2024-07-15 ENCOUNTER — LAB (OUTPATIENT)
Dept: LAB | Facility: HOSPITAL | Age: 59
End: 2024-07-15
Payer: COMMERCIAL

## 2024-07-15 ENCOUNTER — TELEPHONE (OUTPATIENT)
Dept: ONCOLOGY | Facility: CLINIC | Age: 59
End: 2024-07-15
Payer: COMMERCIAL

## 2024-07-15 DIAGNOSIS — C91.10 CLL (CHRONIC LYMPHOCYTIC LEUKEMIA): ICD-10-CM

## 2024-07-15 LAB
ANISOCYTOSIS BLD QL: ABNORMAL
BASOPHILS # BLD MANUAL: 0 10*3/MM3 (ref 0–0.2)
BASOPHILS NFR BLD MANUAL: 0 % (ref 0–1.5)
DEPRECATED RDW RBC AUTO: 51.1 FL (ref 37–54)
EOSINOPHIL # BLD MANUAL: 0 10*3/MM3 (ref 0–0.4)
EOSINOPHIL NFR BLD MANUAL: 0 % (ref 0.3–6.2)
ERYTHROCYTE [DISTWIDTH] IN BLOOD BY AUTOMATED COUNT: 14.5 % (ref 12.3–15.4)
HCT VFR BLD AUTO: 40.4 % (ref 37.5–51)
HGB BLD-MCNC: 12.9 G/DL (ref 13–17.7)
LYMPHOCYTES # BLD MANUAL: 68.86 10*3/MM3 (ref 0.7–3.1)
LYMPHOCYTES NFR BLD MANUAL: 0 % (ref 5–12)
MCH RBC QN AUTO: 30.9 PG (ref 26.6–33)
MCHC RBC AUTO-ENTMCNC: 31.9 G/DL (ref 31.5–35.7)
MCV RBC AUTO: 96.7 FL (ref 79–97)
MONOCYTES # BLD: 0 10*3/MM3 (ref 0.1–0.9)
NEUTROPHILS # BLD AUTO: 6.07 10*3/MM3 (ref 1.7–7)
NEUTROPHILS NFR BLD MANUAL: 8.1 % (ref 42.7–76)
PLAT MORPH BLD: NORMAL
PLATELET # BLD AUTO: 235 10*3/MM3 (ref 140–450)
PMV BLD AUTO: 9.5 FL (ref 6–12)
POLYCHROMASIA BLD QL SMEAR: ABNORMAL
RBC # BLD AUTO: 4.18 10*6/MM3 (ref 4.14–5.8)
SMUDGE CELLS BLD QL SMEAR: ABNORMAL
VARIANT LYMPHS NFR BLD MANUAL: 91.9 % (ref 19.6–45.3)
WBC NRBC COR # BLD AUTO: 74.93 10*3/MM3 (ref 3.4–10.8)

## 2024-07-15 PROCEDURE — 85025 COMPLETE CBC W/AUTO DIFF WBC: CPT

## 2024-07-15 PROCEDURE — 36415 COLL VENOUS BLD VENIPUNCTURE: CPT

## 2024-07-15 PROCEDURE — 85007 BL SMEAR W/DIFF WBC COUNT: CPT

## 2024-07-15 NOTE — TELEPHONE ENCOUNTER
CRITICAL LAB VALUE  Received call from [facility] with CRITICAL LAB VALUE OF:    WBC: 74.93    This information was sent to Dr San for review:

## 2024-10-04 NOTE — PROGRESS NOTES
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  He is under observation.  The patient is here alone.  History is obtained from the patient.  Patient is a reliable historian.        Problem List Items Addressed This Visit    None    Oncology/Hematology History Overview Note   DIAGNOSTIC ABNORMALITIES:  CBC from [facility] 02/05/2013 revealed a WBC of 15.03, hemoglobin 15.6, hematocrit 43.3, MCV 88.4, platelet count 300,000. Absolute lymphocyte count was elevated at 9.7. ANC was 4.7.  PATHOLOGY:  [facility], Fluorescence in-situ Hybridization (FISH ) report, 04/04/2013. 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 Deletion 13q is the most common genetic abnormality in CLL, found in 36-64% of cases. As the sole abnormality, it has a good prognostic value. Both the disease free interval and overall survival are better than in cases with a normal karyotype because of slow disease progression. Monitoring for deletion 13q may be useful in assessing the patient's remission/relapse status. No gene rearrangements characteristic for t(11;14) or abnormalities involving the target regions of chromosomes 6, 11, 12, or 17 were detected. However, it should be noted that the FISH probes utilized in this analysis cannot entirely exclude the presence of other chromosomal abnormalities. Correlation with classic cytogenetics, clinical, flow cytometric, and morphological  data is recommended, if available. 04/10/2013 JF  Peripheral blood, 04/03/2013 ([facility]).  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  Comments:  Flow cytometry identified an abnormal low kappa light chain-restricted B cell population showing coexpression of CD20, CD5, and CD23 without significant expression of CD10, FMC7 or CD79b.  The abnormal B cells are of a predominantly small size and account for approximately 96.2% of the B cells and 37.0% of the total white blood cells.  Roberson-Giemsa stained cytospin preparation shows atypical lymphocytes of a predominantly small to intermediate size.  These findings are indicative of involvement by a mature small B cell neoplasm. The cytomorphologic features and immunophenotype of the neoplastic B cells are consistent with CLL/SLL.  A 1.9% subset of the CD5-positive B cells coexpresses CD38. Of note, in the majority of patients, expression of CD38 on less than 30% of CD5-positive CLL/SLL B cells has been associated with more favorable prognosis. FISH studies for CLL associated genetic abnormalities and IgVH hypermutation analysis by PCR are underway and will be reported separately. Correlation with morphological findings and close clinical followup are recommended.   Abdominal ultrasound done at [facility] on 04/12/2013. IMPRESSION:  1. Mobile gallstone within the lumen of the gallbladder. No gallbladder wall thickening or pericholecystic fluid. No biliary dilatation. Findings suggest cholelithiasis. 2. Echogenic appearance to the liver parenchyma may represent diffuse fatty infiltration. 3. Normal appearance to the kidneys, spleen, and visible portions of the pancreas.   CT of the abdomen and pelvis done on 03/30/2015 at [facility]. Impression: No CT evidence of neoplastic disease. No acute intra-abdominal/pelvic pathological process. Cholelithiasis. Colonic  diverticulosis without CT evidence of acute diverticulitis. Bilateral spondylolysis at L5. Mild spondylolisthesis not excluded.   Ultrasound of the abdomen done on 03/30/2015 at [facility]. Impression: Cholelithiasis. Steatosis of the liver.       PREVIOUS INTERVENTIONS:  Observation.     CLL (chronic lymphocytic leukemia)   12/5/2019 Initial Diagnosis    CLL (chronic lymphocytic leukemia) (CMS/HCC)         PAST MEDICAL HISTORY:  ALLERGIES:  Allergies   Allergen Reactions    Flagyl [Metronidazole] Other (See Comments)     thrush       CURRENT MEDICATIONS:  Outpatient Encounter Medications as of 10/21/2024   Medication Sig Dispense Refill    fenofibrate (TRICOR) 145 MG tablet       lovastatin (MEVACOR) 20 MG tablet Take 2 tablets by mouth Every Night.      NIACIN FLUSH FREE 500 MG capsule TK 1 C PO QD  5    OMEGA-3 FATTY ACIDS PO Take  by mouth.      valsartan-hydrochlorothiazide (DIOVAN-HCT) 160-25 MG per tablet TK 1 T PO QD       No facility-administered encounter medications on file as of 10/21/2024.     ADULT ILLNESSES:  Patient Active Problem List   Diagnosis Code    Family hx colonic polyps Z83.719    Family hx of colon cancer Z80.0    Hx of colonic polyp Z86.0100    Cervical lymphadenopathy R59.0    CLL (chronic lymphocytic leukemia) C91.10    Diverticulitis K57.92    Gallstones K80.20     SURGERIES:  Past Surgical History:   Procedure Laterality Date    CHOLECYSTECTOMY N/A 4/19/2024    Procedure: LAPAROSCOPIC ROBOTIC ASSISTED CHOLECYSTECTOMY WT PRE-OPERATIVE INDOCYANINE GREEN INJECTION;  Surgeon: Suad Padron MD;  Location: [facility] OR;  Service: Robotics - DaVinci;  Laterality: N/A;    COLONOSCOPY  02/15/2013    COLONOSCOPY N/A 3/19/2018    Procedure: COLONOSCOPY WITH ANESTHESIA;  Surgeon: Leonidas Ibanez MD;  Location: [facility] ENDOSCOPY;  Service: Gastroenterology    COLONOSCOPY N/A 2/27/2023    Procedure: COLONOSCOPY WITH ANESTHESIA;  Surgeon: Leonidas Ibanez MD;  Location:   [facility] ENDOSCOPY;  Service: Gastroenterology;  Laterality: N/A;  Pre: hx polyp, diverticulitis, family hx colon ca  Post: diverticulitis  Lonny Simon MD    EYE SURGERY      TONSILLECTOMY AND ADENOIDECTOMY       HEALTH MAINTENANCE ITEMS:  Health Maintenance Due   Topic Date Due    LIPID PANEL  Never done    Pneumococcal Vaccine 0-64 (1 of 2 - PCV) Never done    TDAP/TD VACCINES (1 - Tdap) Never done    HEPATITIS C SCREENING  Never done    ANNUAL PHYSICAL  Never done    INFLUENZA VACCINE  08/01/2024    COVID-19 Vaccine (4 - 2023-24 season) 09/01/2024       <no information>  Last Completed Colonoscopy            COLORECTAL CANCER SCREENING (COLONOSCOPY - Every 10 Years) Next due on 2/27/2033 02/27/2023  COLONOSCOPY    02/27/2023  Surgical Procedure: COLONOSCOPY    03/19/2018  Surgical Procedure: COLONOSCOPY    03/19/2018  COLONOSCOPY    03/27/2017  Outside Claim: CHG BLOOD OCCULT,BY PEROXID,FECES,SINGLE, COLORECTAL SCREEN    Only the first 5 history entries have been loaded, but more history exists.                  Immunization History   Administered Date(s) Administered    COVID-19 (PFIZER) BIVALENT 12+YRS 10/20/2022    COVID-19 (PFIZER) Purple Cap Monovalent 08/26/2021, 09/27/2021     Last Completed Mammogram       This patient has no relevant Health Maintenance data.              FAMILY HISTORY:  Family History   Problem Relation Age of Onset    Colon cancer Maternal Aunt     Colon cancer Maternal Aunt     Colon polyps Maternal Uncle     Colon cancer Paternal Grandmother      SOCIAL HISTORY:  Social History     Socioeconomic History    Marital status:    Tobacco Use    Smoking status: Never    Smokeless tobacco: Never   Vaping Use    Vaping status: Never Used   Substance and Sexual Activity    Alcohol use: Yes     Comment: rare    Drug use: No    Sexual activity: Defer       REVIEW OF SYSTEMS:    Review of Systems   Constitutional:  Negative for fatigue, fever and unexpected weight loss.         "\"I feel okay.\"   HENT:  Negative for congestion and mouth sores.    Eyes:  Negative for discharge and redness.   Respiratory:  Negative for shortness of breath and wheezing.    Cardiovascular:  Negative for chest pain and leg swelling.   Gastrointestinal:  Negative for abdominal pain, nausea and vomiting.   Endocrine: Negative for cold intolerance and heat intolerance.   Genitourinary:  Negative for dysuria and flank pain.   Musculoskeletal:  Negative for gait problem and myalgias.   Skin:  Negative for pallor.   Allergic/Immunologic: Negative for food allergies.   Neurological:  Negative for speech difficulty, weakness and confusion.   Hematological:  Negative for adenopathy. Does not bruise/bleed easily.   Psychiatric/Behavioral:  Negative for agitation. The patient is not nervous/anxious.          VITAL SIGNS: /62   Pulse 78   Temp 97.6 °F (36.4 °C)   Resp 18   Ht 186 cm (73.23\")   Wt 93.9 kg (207 lb)   SpO2 99%   BMI 27.14 kg/m²  Body surface area is 2.19 meters squared. Lost 4 pounds.   Pain Score    10/21/24 0827   PainSc: 0-No pain           PHYSICAL EXAMINATION:     Physical Exam  Vitals reviewed.   Constitutional:       General: He is not in acute distress.  HENT:      Head: Normocephalic and atraumatic.   Eyes:      General: No scleral icterus.  Cardiovascular:      Rate and Rhythm: Normal rate.   Pulmonary:      Effort: No respiratory distress.      Breath sounds: No wheezing.   Abdominal:      General: Bowel sounds are normal.      Palpations: Abdomen is soft.      Tenderness: There is no abdominal tenderness.   Musculoskeletal:         General: No swelling.      Cervical back: Neck supple.   Skin:     Coloration: Skin is not pale.   Neurological:      Mental Status: He is alert and oriented to person, place, and time.   Psychiatric:         Mood and Affect: Mood normal.         Behavior: Behavior normal.         Thought Content: Thought content normal.         Judgment: Judgment normal. " "        LABS    Lab Results - Last 18 Months   Lab Units 10/07/24  0726 07/15/24  0708 04/08/24  0709 10/09/23  0706   HEMOGLOBIN g/dL 13.9 12.9* 13.8 15.1   HEMATOCRIT % 43.3 40.4 43.9 46.0   MCV fL 96.7 96.7 96.9 97.5*   WBC 10*3/mm3 81.24* 74.93* 80.07* 76.58*   RDW % 13.9 14.5 14.1 13.4   MPV fL 9.4 9.5 9.3 9.7   PLATELETS 10*3/mm3 201 235 276 196   NEUTROS ABS 10*3/mm3 3.33 6.07 7.21* 14.40*   EOS ABS 10*3/mm3 0.00 0.00  --   --    BASOS ABS 10*3/mm3 0.00 0.00  --   --    NEUTROPHIL % % 4.1* 8.1* 9.0* 18.8*   MONOCYTES % % 0.0* 0.0* 1.0*  --    BASOPHIL % % 0.0 0.0  --   --    ATYP LYMPH % %  --   --  17.0*  --    ANISOCYTOSIS  Slight/1+ Slight/1+ Slight/1+ Slight/1+       Lab Results - Last 18 Months   Lab Units 10/07/24  0726 04/08/24  0709 10/09/23  0706   GLUCOSE mg/dL 121* 123* 124*   SODIUM mmol/L 139 139 143   POTASSIUM mmol/L 4.6 3.9 4.2   CO2 mmol/L 28.0 29.0 28.0   CHLORIDE mmol/L 103 100 102   ANION GAP mmol/L 8.0 10.0 13.0   CREATININE mg/dL 1.17 1.13 0.93   BUN mg/dL 25* 23 16   BUN / CREAT RATIO  21.4 20.4 17.2   CALCIUM mg/dL 9.7 10.0 9.5   ALK PHOS U/L 81 92 113   TOTAL PROTEIN g/dL 6.6 7.2 6.9   ALT (SGPT) U/L 13 20 23   AST (SGOT) U/L 21 24 33   BILIRUBIN mg/dL 0.6 0.7 1.2   ALBUMIN g/dL 4.7 4.4 4.5   GLOBULIN gm/dL 1.9 2.8 2.4       No results for input(s): \"MSPIKE\", \"KAPPALAMB\", \"IGLFLC\", \"URICACID\", \"FREEKAPPAL\", \"CEA\", \"LDH\", \"REFLABREPO\" in the last 46273 hours.    No results for input(s): \"IRON\", \"TIBC\", \"LABIRON\", \"FERRITIN\", \"S0WZLSC\", \"TSH\", \"FOLATE\" in the last 17421 hours.    Invalid input(s): \"VITB12\"      Rico Enrique reports a pain score of 0.          ASSESSMENT:  1.   Chronic lymphocytic leukemia/small lymphocytic lymphoma (CLL/SLL) monoclonal kappa.  Stage 0.  Asymptomatic.  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  Complications: None.  Treatment status: On surveillance.  Prognosis: Good.  2.   Good performance status of 0.  3.   Elevated liver function tests secondary " to statin, cholelithiasis, and fatty liver.  4.   A 1.5 cm right level II node, followed by Dr. Hernandez.    5.   Elevated PSA. Followed by urology.  6.   History of hyperkalemia.  7.   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           PLAN:  1.    Re:   Heme status.  WBC 81.2 from 74.9 from 80.07 from 76.5 from 64.2 from 72.4, hemoglobin 13.9, hematocrit 43.3, MCV 96.9, platelet 201 from 235 and lymphocytes 77.9 from 68.8 from 72.06 from 62.26 from 58.4 from 66.7 from 58.8.  There is no rapid doubling.  2.    Re:  Pre-office CMP. Bilirubin 0.6 from 0.7 from 1.2 from 1.6 from 1.6 from 1.5.  GFR 70.9 mL/min.  3.    Re: Pathology report postcholecystectomy.  No evidence of dysplasia or malignancy on 4/23/2024.  4.    Re:  Indications for treatment of CLL/SLL like B symptoms, rapid doubling of lymphocytes, symptomatic or bulky adenopathies or recurrent infections requiring hospitalization with intravenous antibiotics.  5.    Re: Interval lymphocyte 68.8 from 72.06 on 7/15/2024, stable.  6.    Re: Continue observation, CLL/SLL.    7.   Order CBC with differential every 3 months.  8.   Plan of care discussed with patient.  Understanding expressed.  Patient agreeable to proceed.    9.   Continue ongoing management per primary care physician and other specialists.  10.  Return to office in 6 months with CMP and CBC and differential, same day.             I have reviewed the assessment and plan and verified the accuracy of it. No changes to assessment and plan since the information was documented. Stanley San MD 10/21/24         I spent 30 total minutes, face-to-face, caring for Rico today. Greater than 50% of this time involved counseling and/or coordination of care as documented within this note.           cc:   Konstantin Fleming MD          (Suad Padron MD)          (Maggie Thompson MD)          (Fredy Waldrop MD)          (Yang  MD David)          (Leonidas Ibanez MD)

## 2024-10-07 ENCOUNTER — LAB (OUTPATIENT)
Dept: LAB | Facility: HOSPITAL | Age: 59
End: 2024-10-07
Payer: COMMERCIAL

## 2024-10-07 ENCOUNTER — TELEPHONE (OUTPATIENT)
Dept: ONCOLOGY | Facility: CLINIC | Age: 59
End: 2024-10-07
Payer: COMMERCIAL

## 2024-10-07 DIAGNOSIS — C91.10 CLL (CHRONIC LYMPHOCYTIC LEUKEMIA): ICD-10-CM

## 2024-10-07 LAB
ALBUMIN SERPL-MCNC: 4.7 G/DL (ref 3.5–5.2)
ALBUMIN/GLOB SERPL: 2.5 G/DL
ALP SERPL-CCNC: 81 U/L (ref 39–117)
ALT SERPL W P-5'-P-CCNC: 13 U/L (ref 1–41)
ANION GAP SERPL CALCULATED.3IONS-SCNC: 8 MMOL/L (ref 5–15)
ANISOCYTOSIS BLD QL: ABNORMAL
AST SERPL-CCNC: 21 U/L (ref 1–40)
BASOPHILS # BLD MANUAL: 0 10*3/MM3 (ref 0–0.2)
BASOPHILS NFR BLD MANUAL: 0 % (ref 0–1.5)
BILIRUB SERPL-MCNC: 0.6 MG/DL (ref 0–1.2)
BUN SERPL-MCNC: 25 MG/DL (ref 8–23)
BUN/CREAT SERPL: 21.4 (ref 7–25)
CALCIUM SPEC-SCNC: 9.7 MG/DL (ref 8.6–10.5)
CHLORIDE SERPL-SCNC: 103 MMOL/L (ref 98–107)
CO2 SERPL-SCNC: 28 MMOL/L (ref 22–29)
CREAT SERPL-MCNC: 1.17 MG/DL (ref 0.76–1.27)
DEPRECATED RDW RBC AUTO: 49 FL (ref 37–54)
EGFRCR SERPLBLD CKD-EPI 2021: 70.9 ML/MIN/1.73
EOSINOPHIL # BLD MANUAL: 0 10*3/MM3 (ref 0–0.4)
EOSINOPHIL NFR BLD MANUAL: 0 % (ref 0.3–6.2)
ERYTHROCYTE [DISTWIDTH] IN BLOOD BY AUTOMATED COUNT: 13.9 % (ref 12.3–15.4)
GLOBULIN UR ELPH-MCNC: 1.9 GM/DL
GLUCOSE SERPL-MCNC: 121 MG/DL (ref 65–99)
HCT VFR BLD AUTO: 43.3 % (ref 37.5–51)
HGB BLD-MCNC: 13.9 G/DL (ref 13–17.7)
LYMPHOCYTES # BLD MANUAL: 77.91 10*3/MM3 (ref 0.7–3.1)
LYMPHOCYTES NFR BLD MANUAL: 0 % (ref 5–12)
MCH RBC QN AUTO: 31 PG (ref 26.6–33)
MCHC RBC AUTO-ENTMCNC: 32.1 G/DL (ref 31.5–35.7)
MCV RBC AUTO: 96.7 FL (ref 79–97)
MONOCYTES # BLD: 0 10*3/MM3 (ref 0.1–0.9)
NEUTROPHILS # BLD AUTO: 3.33 10*3/MM3 (ref 1.7–7)
NEUTROPHILS NFR BLD MANUAL: 4.1 % (ref 42.7–76)
PLAT MORPH BLD: NORMAL
PLATELET # BLD AUTO: 201 10*3/MM3 (ref 140–450)
PMV BLD AUTO: 9.4 FL (ref 6–12)
POLYCHROMASIA BLD QL SMEAR: ABNORMAL
POTASSIUM SERPL-SCNC: 4.6 MMOL/L (ref 3.5–5.2)
PROT SERPL-MCNC: 6.6 G/DL (ref 6–8.5)
RBC # BLD AUTO: 4.48 10*6/MM3 (ref 4.14–5.8)
SODIUM SERPL-SCNC: 139 MMOL/L (ref 136–145)
VARIANT LYMPHS NFR BLD MANUAL: 95.9 % (ref 19.6–45.3)
WBC MORPH BLD: NORMAL
WBC NRBC COR # BLD AUTO: 81.24 10*3/MM3 (ref 3.4–10.8)

## 2024-10-07 PROCEDURE — 36415 COLL VENOUS BLD VENIPUNCTURE: CPT

## 2024-10-07 PROCEDURE — 85025 COMPLETE CBC W/AUTO DIFF WBC: CPT

## 2024-10-07 PROCEDURE — 80053 COMPREHEN METABOLIC PANEL: CPT

## 2024-10-07 PROCEDURE — 85007 BL SMEAR W/DIFF WBC COUNT: CPT

## 2024-10-07 NOTE — TELEPHONE ENCOUNTER
CRITICAL LAB VALUE  Received call from [facility] Hematology with CRITICAL LAB VALUE of:  WBC: 81.24  This information was sent to Dr San for review

## 2024-10-21 ENCOUNTER — OFFICE VISIT (OUTPATIENT)
Dept: ONCOLOGY | Facility: CLINIC | Age: 59
End: 2024-10-21
Payer: COMMERCIAL

## 2024-10-21 VITALS
OXYGEN SATURATION: 99 % | HEART RATE: 78 BPM | HEIGHT: 73 IN | TEMPERATURE: 97.6 F | RESPIRATION RATE: 18 BRPM | WEIGHT: 207 LBS | BODY MASS INDEX: 27.43 KG/M2 | DIASTOLIC BLOOD PRESSURE: 62 MMHG | SYSTOLIC BLOOD PRESSURE: 130 MMHG

## 2024-10-21 DIAGNOSIS — C91.10 CLL (CHRONIC LYMPHOCYTIC LEUKEMIA): Primary | ICD-10-CM

## 2025-01-20 ENCOUNTER — LAB (OUTPATIENT)
Dept: LAB | Facility: HOSPITAL | Age: 60
End: 2025-01-20
Payer: COMMERCIAL

## 2025-01-20 ENCOUNTER — TELEPHONE (OUTPATIENT)
Dept: ONCOLOGY | Facility: CLINIC | Age: 60
End: 2025-01-20
Payer: COMMERCIAL

## 2025-01-20 DIAGNOSIS — C91.10 CLL (CHRONIC LYMPHOCYTIC LEUKEMIA): ICD-10-CM

## 2025-01-20 LAB
ANISOCYTOSIS BLD QL: ABNORMAL
BASOPHILS # BLD MANUAL: 0 10*3/MM3 (ref 0–0.2)
BASOPHILS NFR BLD MANUAL: 0 % (ref 0–1.5)
DEPRECATED RDW RBC AUTO: 50.1 FL (ref 37–54)
EOSINOPHIL # BLD MANUAL: 0 10*3/MM3 (ref 0–0.4)
EOSINOPHIL NFR BLD MANUAL: 0 % (ref 0.3–6.2)
ERYTHROCYTE [DISTWIDTH] IN BLOOD BY AUTOMATED COUNT: 14.2 % (ref 12.3–15.4)
HCT VFR BLD AUTO: 42 % (ref 37.5–51)
HGB BLD-MCNC: 13.6 G/DL (ref 13–17.7)
LYMPHOCYTES # BLD MANUAL: 79.43 10*3/MM3 (ref 0.7–3.1)
LYMPHOCYTES NFR BLD MANUAL: 0 % (ref 5–12)
MCH RBC QN AUTO: 31.5 PG (ref 26.6–33)
MCHC RBC AUTO-ENTMCNC: 32.4 G/DL (ref 31.5–35.7)
MCV RBC AUTO: 97.2 FL (ref 79–97)
MONOCYTES # BLD: 0 10*3/MM3 (ref 0.1–0.9)
NEUTROPHILS # BLD AUTO: 4.18 10*3/MM3 (ref 1.7–7)
NEUTROPHILS NFR BLD MANUAL: 5 % (ref 42.7–76)
PLAT MORPH BLD: NORMAL
PLATELET # BLD AUTO: 193 10*3/MM3 (ref 140–450)
PMV BLD AUTO: 9.6 FL (ref 6–12)
POLYCHROMASIA BLD QL SMEAR: ABNORMAL
RBC # BLD AUTO: 4.32 10*6/MM3 (ref 4.14–5.8)
SMUDGE CELLS BLD QL SMEAR: ABNORMAL
VARIANT LYMPHS NFR BLD MANUAL: 16 % (ref 0–5)
VARIANT LYMPHS NFR BLD MANUAL: 79 % (ref 19.6–45.3)
WBC NRBC COR # BLD AUTO: 83.61 10*3/MM3 (ref 3.4–10.8)

## 2025-01-20 PROCEDURE — 36415 COLL VENOUS BLD VENIPUNCTURE: CPT

## 2025-01-20 PROCEDURE — 85007 BL SMEAR W/DIFF WBC COUNT: CPT

## 2025-01-20 PROCEDURE — 85025 COMPLETE CBC W/AUTO DIFF WBC: CPT

## 2025-01-20 NOTE — TELEPHONE ENCOUNTER
CRITICAL LAB VALUE  Received call from [facility] with CRITICAL LAB VALUE OF:    WBC: 83.61    This information was sent to Dr San for review

## 2025-03-25 ENCOUNTER — NURSE TRIAGE (OUTPATIENT)
Dept: CALL CENTER | Facility: HOSPITAL | Age: 60
End: 2025-03-25
Payer: COMMERCIAL

## 2025-03-25 NOTE — TELEPHONE ENCOUNTER
"Reason for Disposition   Lab or radiology calling with CRITICAL test results    Additional Information   Negative: Lab calling with strep throat test results and triager can call in prescription   Negative: Lab calling with urinalysis test results and triager can call in prescription   Negative: Medication questions   Negative: Medication renewal and refill questions   Negative: Pre-operative or pre-procedural questions   Negative: ED call to PCP (i.e., primary care provider; doctor, NP, or PA)   Negative: Doctor (or NP/PA) call to PCP   Negative: Call about patient who is currently hospitalized   Negative: [1] Follow-up call from patient regarding patient's clinical status AND [2] information urgent   Negative: [1] Caller requests to speak ONLY to PCP AND [2] URGENT question   Negative: [1] Caller requests to speak to PCP now AND [2] won't tell us reason for call  (Exception: If 10 pm to 6 am, caller must first discuss reason for the call.)   Negative: Notification of hospital admission   Negative: Notification of death   Negative: Caller requesting lab results  (Exception: Routine or non-urgent lab result.)   Negative: Lab or radiology calling with test results   Negative: [1] Follow-up call from patient regarding patient's clinical status AND [2] information NON-URGENT   Negative: [1] Caller requests to speak ONLY to PCP AND [2] NON-URGENT question   Negative: Caller requesting an appointment, triage offered and declined   Negative: Caller requesting routine or non-urgent lab result   Negative: [1] Other NON-URGENT information for PCP AND [2] does not require PCP response    Answer Assessment - Initial Assessment Questions  1. REASON FOR CALL or QUESTION: \"What is your reason for calling today?\" or \"How can I best  help you?\" or \"What question do you have that I can help answer?\"        Alysha from [facility] calling with a critical lab result, office [facility] ordering provider Dr. Konstantin Fleming, " critical lab WBC 94.2 drawn on 3/24/25 @ 9:35 am            2. CALLER: Document the source of call. (e.g., laboratory, patient).      [facility]    Protocols used: PCP Call - No Triage-ADULT-

## 2025-03-25 NOTE — TELEPHONE ENCOUNTER
Alysha from [facility] calling with a critical lab result, office [facility] ordering provider Dr. Konstantin Fleming, critical lab WBC 94.2 drawn on 3/24/25 @ 9:35 am    Dr. Lonny [facility] on call

## 2025-03-25 NOTE — TELEPHONE ENCOUNTER
Dr. [facility] called information given     Alysha from [facility] calling with a critical lab result, office [facility] ordering provider Dr. Konstantin Fleming, critical lab WBC 94.2 drawn on 3/24/25 @ 9:35 am    No further orders at this time

## 2025-03-27 ENCOUNTER — TELEPHONE (OUTPATIENT)
Dept: ONCOLOGY | Facility: CLINIC | Age: 60
End: 2025-03-27

## 2025-03-27 NOTE — TELEPHONE ENCOUNTER
[facility] received a fax that requires your attention. The document has been indexed to the patient’s chart for your review.      Reason for sending: RECEIVED MEDICAL RECORDS.     Documents Description: LABS 03/24/25.> INDEXED IN CHART    Name of Sender: [facility] [phone]    Date Indexed: 03/27/25    Notes (if needed): THANKS!

## 2025-04-14 ENCOUNTER — TELEPHONE (OUTPATIENT)
Dept: ONCOLOGY | Facility: CLINIC | Age: 60
End: 2025-04-14
Payer: COMMERCIAL

## 2025-04-14 ENCOUNTER — LAB (OUTPATIENT)
Dept: LAB | Facility: HOSPITAL | Age: 60
End: 2025-04-14
Payer: COMMERCIAL

## 2025-04-14 DIAGNOSIS — C91.10 CLL (CHRONIC LYMPHOCYTIC LEUKEMIA): ICD-10-CM

## 2025-04-14 LAB
ALBUMIN SERPL-MCNC: 4.7 G/DL (ref 3.5–5.2)
ALBUMIN/GLOB SERPL: 2.1 G/DL
ALP SERPL-CCNC: 88 U/L (ref 39–117)
ALT SERPL W P-5'-P-CCNC: 17 U/L (ref 1–41)
ANION GAP SERPL CALCULATED.3IONS-SCNC: 12 MMOL/L (ref 5–15)
ANISOCYTOSIS BLD QL: ABNORMAL
AST SERPL-CCNC: 20 U/L (ref 1–40)
BASOPHILS # BLD MANUAL: 0 10*3/MM3 (ref 0–0.2)
BASOPHILS NFR BLD MANUAL: 0 % (ref 0–1.5)
BILIRUB SERPL-MCNC: 0.5 MG/DL (ref 0–1.2)
BUN SERPL-MCNC: 29 MG/DL (ref 8–23)
BUN/CREAT SERPL: 24 (ref 7–25)
CALCIUM SPEC-SCNC: 9.8 MG/DL (ref 8.6–10.5)
CHLORIDE SERPL-SCNC: 101 MMOL/L (ref 98–107)
CO2 SERPL-SCNC: 27 MMOL/L (ref 22–29)
CREAT SERPL-MCNC: 1.21 MG/DL (ref 0.76–1.27)
DEPRECATED RDW RBC AUTO: 49.1 FL (ref 37–54)
EGFRCR SERPLBLD CKD-EPI 2021: 68.1 ML/MIN/1.73
EOSINOPHIL # BLD MANUAL: 0 10*3/MM3 (ref 0–0.4)
EOSINOPHIL NFR BLD MANUAL: 0 % (ref 0.3–6.2)
ERYTHROCYTE [DISTWIDTH] IN BLOOD BY AUTOMATED COUNT: 13.9 % (ref 12.3–15.4)
GLOBULIN UR ELPH-MCNC: 2.2 GM/DL
GLUCOSE SERPL-MCNC: 106 MG/DL (ref 65–99)
HCT VFR BLD AUTO: 42.7 % (ref 37.5–51)
HGB BLD-MCNC: 13.8 G/DL (ref 13–17.7)
LYMPHOCYTES # BLD MANUAL: 93.04 10*3/MM3 (ref 0.7–3.1)
LYMPHOCYTES NFR BLD MANUAL: 0 % (ref 5–12)
MACROCYTES BLD QL SMEAR: ABNORMAL
MCH RBC QN AUTO: 31.3 PG (ref 26.6–33)
MCHC RBC AUTO-ENTMCNC: 32.3 G/DL (ref 31.5–35.7)
MCV RBC AUTO: 96.8 FL (ref 79–97)
MONOCYTES # BLD: 0 10*3/MM3 (ref 0.1–0.9)
NEUTROPHILS # BLD AUTO: 4.05 10*3/MM3 (ref 1.7–7)
NEUTROPHILS NFR BLD MANUAL: 3.3 % (ref 42.7–76)
NEUTS BAND NFR BLD MANUAL: 0.8 % (ref 0–5)
PLAT MORPH BLD: NORMAL
PLATELET # BLD AUTO: 201 10*3/MM3 (ref 140–450)
PMV BLD AUTO: 9.3 FL (ref 6–12)
POLYCHROMASIA BLD QL SMEAR: ABNORMAL
POTASSIUM SERPL-SCNC: 4.5 MMOL/L (ref 3.5–5.2)
PROT SERPL-MCNC: 6.9 G/DL (ref 6–8.5)
RBC # BLD AUTO: 4.41 10*6/MM3 (ref 4.14–5.8)
SODIUM SERPL-SCNC: 140 MMOL/L (ref 136–145)
VARIANT LYMPHS NFR BLD MANUAL: 0.8 % (ref 0–5)
VARIANT LYMPHS NFR BLD MANUAL: 95 % (ref 19.6–45.3)
WBC MORPH BLD: NORMAL
WBC NRBC COR # BLD AUTO: 97.12 10*3/MM3 (ref 3.4–10.8)

## 2025-04-14 PROCEDURE — 85007 BL SMEAR W/DIFF WBC COUNT: CPT

## 2025-04-14 PROCEDURE — 85025 COMPLETE CBC W/AUTO DIFF WBC: CPT

## 2025-04-14 PROCEDURE — 36415 COLL VENOUS BLD VENIPUNCTURE: CPT

## 2025-04-14 PROCEDURE — 80053 COMPREHEN METABOLIC PANEL: CPT

## 2025-04-14 NOTE — PROGRESS NOTES
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  He is not requiring treatment.  The patient is here alone.  History is obtained from the patient.  He is a reliable historian.         Pertinent history.  1.   A 1.5 cm right level II node, followed by Dr. Hernandez.    2.   Elevated PSA. Followed by urology.  3.   History of hyperkalemia.  4.   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          Problem List Items Addressed This Visit    None    Oncology/Hematology History Overview Note   DIAGNOSTIC ABNORMALITIES:  CBC from [facility] 02/05/2013 revealed a WBC of 15.03, hemoglobin 15.6, hematocrit 43.3, MCV 88.4, platelet count 300,000. Absolute lymphocyte count was elevated at 9.7. ANC was 4.7.  PATHOLOGY:  [facility], Fluorescence in-situ Hybridization (FISH ) report, 04/04/2013. 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 Deletion 13q is the most common genetic abnormality in CLL, found in 36-64% of cases. As the sole abnormality, it has a good prognostic value. Both the disease free interval and overall survival are better than in cases with a normal karyotype because of slow disease progression. Monitoring for deletion 13q may be useful in assessing the patient's remission/relapse status. No gene rearrangements characteristic for t(11;14) or abnormalities involving the target regions of chromosomes 6, 11,  12, or 17 were detected. However, it should be noted that the FISH probes utilized in this analysis cannot entirely exclude the presence of other chromosomal abnormalities. Correlation with classic cytogenetics, clinical, flow cytometric, and morphological data is recommended, if available. 04/10/2013 JF  Peripheral blood, 04/03/2013 ([facility]).  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  Comments:  Flow cytometry identified an abnormal low kappa light chain-restricted B cell population showing coexpression of CD20, CD5, and CD23 without significant expression of CD10, FMC7 or CD79b.  The abnormal B cells are of a predominantly small size and account for approximately 96.2% of the B cells and 37.0% of the total white blood cells.  Roberson-Giemsa stained cytospin preparation shows atypical lymphocytes of a predominantly small to intermediate size.  These findings are indicative of involvement by a mature small B cell neoplasm. The cytomorphologic features and immunophenotype of the neoplastic B cells are consistent with CLL/SLL.  A 1.9% subset of the CD5-positive B cells coexpresses CD38. Of note, in the majority of patients, expression of CD38 on less than 30% of CD5-positive CLL/SLL B cells has been associated with more favorable prognosis. FISH studies for CLL associated genetic abnormalities and IgVH hypermutation analysis by PCR are underway and will be reported separately. Correlation with morphological findings and close clinical followup are recommended.   Abdominal ultrasound done at [facility] on 04/12/2013. IMPRESSION:  1. Mobile gallstone within the lumen of the gallbladder. No gallbladder wall thickening or pericholecystic fluid. No biliary dilatation. Findings suggest cholelithiasis. 2. Echogenic appearance to the liver parenchyma may represent diffuse fatty infiltration. 3. Normal appearance to the kidneys, spleen,  and visible portions of the pancreas.   CT of the abdomen and pelvis done on 03/30/2015 at [facility]. Impression: No CT evidence of neoplastic disease. No acute intra-abdominal/pelvic pathological process. Cholelithiasis. Colonic diverticulosis without CT evidence of acute diverticulitis. Bilateral spondylolysis at L5. Mild spondylolisthesis not excluded.   Ultrasound of the abdomen done on 03/30/2015 at [facility]. Impression: Cholelithiasis. Steatosis of the liver.       PREVIOUS INTERVENTIONS:  Observation.     CLL (chronic lymphocytic leukemia)   12/5/2019 Initial Diagnosis    CLL (chronic lymphocytic leukemia) (CMS/HCC)         PAST MEDICAL HISTORY:  ALLERGIES:  Allergies   Allergen Reactions    Flagyl [Metronidazole] Other (See Comments)     thrush       CURRENT MEDICATIONS:  Outpatient Encounter Medications as of 4/21/2025   Medication Sig Dispense Refill    fenofibrate (TRICOR) 145 MG tablet       NIACIN FLUSH FREE 500 MG capsule TK 1 C PO QD  5    OMEGA-3 FATTY ACIDS PO Take  by mouth.      rosuvastatin (CRESTOR) 10 MG tablet Take 1 tablet by mouth Daily.      valsartan-hydrochlorothiazide (DIOVAN-HCT) 160-25 MG per tablet TK 1 T PO QD      [DISCONTINUED] lovastatin (MEVACOR) 20 MG tablet Take 2 tablets by mouth Every Night.       No facility-administered encounter medications on file as of 4/21/2025.     ADULT ILLNESSES:  Patient Active Problem List   Diagnosis Code    Family hx colonic polyps Z83.719    Family hx of colon cancer Z80.0    Hx of colonic polyp Z86.0100    Cervical lymphadenopathy R59.0    CLL (chronic lymphocytic leukemia) C91.10    Diverticulitis K57.92    Gallstones K80.20     SURGERIES:  Past Surgical History:   Procedure Laterality Date    CHOLECYSTECTOMY N/A 4/19/2024    Procedure: LAPAROSCOPIC ROBOTIC ASSISTED CHOLECYSTECTOMY WTIH PRE-OPERATIVE INDOCYANINE GREEN INJECTION;  Surgeon: Suad Padron MD;  Location: [facility] OR;  Service: Robotics -  [place];  Laterality: N/A;    COLONOSCOPY  02/15/2013    COLONOSCOPY N/A 3/19/2018    Procedure: COLONOSCOPY WITH ANESTHESIA;  Surgeon: Leonidas Ibanez MD;  Location:  [facility];  Service: Gastroenterology    COLONOSCOPY N/A 2/27/2023    Procedure: COLONOSCOPY WITH ANESTHESIA;  Surgeon: Leonidas Ibanez MD;  Location:  [facility];  Service: Gastroenterology;  Laterality: N/A;  Pre: hx polyp, diverticulitis, family hx colon ca  Post: diverticulitis  Lonny Simon MD    EYE SURGERY      TONSILLECTOMY AND ADENOIDECTOMY       HEALTH MAINTENANCE ITEMS:  Health Maintenance Due   Topic Date Due    Pneumococcal Vaccine 50+ (1 of 2 - PCV) Never done    TDAP/TD VACCINES (1 - Tdap) Never done    HEPATITIS C SCREENING  Never done    ANNUAL PHYSICAL  Never done    COVID-19 Vaccine (4 - 2024-25 season) 09/01/2024       <no information>  Last Completed Colonoscopy            Needs Review       COLORECTAL CANCER SCREENING (COLONOSCOPY - Every 10 Years) Tentatively due on 2/27/2033 02/27/2023  Surgical Procedure: COLONOSCOPY    02/27/2023  COLONOSCOPY    03/19/2018  COLONOSCOPY    03/19/2018  Surgical Procedure: COLONOSCOPY    03/27/2017  Outside Claim: CHG BLOOD OCCULT,BY PEROXID,FECES,SINGLE, COLORECTAL SCREEN     Only the first 5 history entries have been loaded, but more history exists.                        Immunization History   Administered Date(s) Administered    COVID-19 (PFIZER) BIVALENT 12+YRS 10/20/2022    COVID-19 (PFIZER) Purple Cap Monovalent 08/26/2021, 09/27/2021     Last Completed Mammogram    This patient has no relevant Health Maintenance data.           FAMILY HISTORY:  Family History   Problem Relation Age of Onset    Colon cancer Maternal Aunt     Colon cancer Maternal Aunt     Colon polyps Maternal Uncle     Colon cancer Paternal Grandmother      SOCIAL HISTORY:  Social History     Socioeconomic History    Marital status:    Tobacco Use    Smoking status: Never    Smokeless  "tobacco: Never   Vaping Use    Vaping status: Never Used   Substance and Sexual Activity    Alcohol use: Yes     Comment: rare    Drug use: No    Sexual activity: Defer       REVIEW OF SYSTEMS:    Review of Systems   Constitutional:  Negative for fatigue, fever and unexpected weight loss.        \"Feel good.\"   HENT:  Negative for congestion and mouth sores.    Eyes:  Negative for redness and visual disturbance.   Respiratory:  Negative for shortness of breath and wheezing.    Cardiovascular:  Negative for chest pain and palpitations.   Gastrointestinal:  Negative for abdominal pain, diarrhea, nausea and vomiting.   Endocrine: Negative for cold intolerance and heat intolerance.   Genitourinary:  Negative for difficulty urinating and dysuria.   Musculoskeletal:  Negative for gait problem and myalgias.   Skin:  Negative for pallor.   Allergic/Immunologic: Negative for food allergies.   Neurological:  Negative for speech difficulty, weakness and confusion.   Hematological:  Negative for adenopathy. Does not bruise/bleed easily.   Psychiatric/Behavioral:  Negative for agitation and hallucinations. The patient is not nervous/anxious.          VITAL SIGNS: /78   Pulse 76   Temp 97.8 °F (36.6 °C)   Resp 16   Ht 186 cm (73.23\")   Wt 95 kg (209 lb 6.4 oz)   SpO2 98%   BMI 27.45 kg/m²  Body surface area is 2.2 meters squared.  Gained 2 pounds.   Pain Score    04/21/25 0807   PainSc: 0-No pain           PHYSICAL EXAMINATION:     Physical Exam  Vitals reviewed.   Constitutional:       General: He is not in acute distress.  HENT:      Head: Normocephalic and atraumatic.   Eyes:      General: No scleral icterus.  Cardiovascular:      Rate and Rhythm: Normal rate.   Pulmonary:      Effort: No respiratory distress.      Breath sounds: No wheezing.   Abdominal:      General: Bowel sounds are normal.      Palpations: Abdomen is soft.      Tenderness: There is no abdominal tenderness.   Musculoskeletal:         General: No " "swelling.      Cervical back: Neck supple.   Skin:     Coloration: Skin is not pale.   Neurological:      Mental Status: He is alert and oriented to person, place, and time.   Psychiatric:         Mood and Affect: Mood normal.         Behavior: Behavior normal.         Thought Content: Thought content normal.         Judgment: Judgment normal.         LABS    Lab Results - Last 18 Months   Lab Units 04/14/25  0740 01/20/25  0703 10/07/24  0726 07/15/24  0708 04/08/24  0709   HEMOGLOBIN g/dL 13.8 13.6 13.9 12.9* 13.8   HEMATOCRIT % 42.7 42.0 43.3 40.4 43.9   MCV fL 96.8 97.2* 96.7 96.7 96.9   WBC 10*3/mm3 97.12* 83.61* 81.24* 74.93* 80.07*   RDW % 13.9 14.2 13.9 14.5 14.1   MPV fL 9.3 9.6 9.4 9.5 9.3   PLATELETS 10*3/mm3 201 193 201 235 276   NEUTROS ABS 10*3/mm3 4.05 4.18 3.33 6.07 7.21*   EOS ABS 10*3/mm3 0.00 0.00 0.00 0.00  --    BASOS ABS 10*3/mm3 0.00 0.00 0.00 0.00  --    NEUTROPHIL % % 3.3* 5.0* 4.1* 8.1* 9.0*   MONOCYTES % % 0.0* 0.0* 0.0* 0.0* 1.0*   BASOPHIL % % 0.0 0.0 0.0 0.0  --    ATYP LYMPH % % 0.8 16.0*  --   --  17.0*   ANISOCYTOSIS  Slight/1+ Slight/1+ Slight/1+ Slight/1+ Slight/1+       Lab Results - Last 18 Months   Lab Units 04/14/25  0740 10/07/24  0726 04/08/24  0709   GLUCOSE mg/dL 106* 121* 123*   SODIUM mmol/L 140 139 139   POTASSIUM mmol/L 4.5 4.6 3.9   CO2 mmol/L 27.0 28.0 29.0   CHLORIDE mmol/L 101 103 100   ANION GAP mmol/L 12.0 8.0 10.0   CREATININE mg/dL 1.21 1.17 1.13   BUN mg/dL 29* 25* 23   BUN / CREAT RATIO  24.0 21.4 20.4   CALCIUM mg/dL 9.8 9.7 10.0   ALK PHOS U/L 88 81 92   TOTAL PROTEIN g/dL 6.9 6.6 7.2   ALT (SGPT) U/L 17 13 20   AST (SGOT) U/L 20 21 24   BILIRUBIN mg/dL 0.5 0.6 0.7   ALBUMIN g/dL 4.7 4.7 4.4   GLOBULIN gm/dL 2.2 1.9 2.8       No results for input(s): \"MSPIKE\", \"KAPPALAMB\", \"IGLFLC\", \"URICACID\", \"FREEKAPPAL\", \"CEA\", \"LDH\", \"REFLABREPO\" in the last 57725 hours.    No results for input(s): \"IRON\", \"TIBC\", \"LABIRON\", \"FERRITIN\", \"E0UXJFC\", \"TSH\", \"FOLATE\" in the " "last 76992 hours.    Invalid input(s): \"VITB12\"      Rico Enrique reports a pain score of 0.            ASSESSMENT:  1.   Chronic lymphocytic leukemia/small lymphocytic lymphoma (CLL/SLL) monoclonal kappa.  Stage 0.  Asymptomatic.  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  Complications: None.  Treatment status: On surveillance.  Prognosis: Good.  2.   Performance status of 0.  3.   Elevated liver function tests secondary to statin, cholelithiasis, and fatty liver.  On observation.           PLAN:  1.    Re:   Heme status.  WBC 97.1 from 83.6 from 81.24 from 74.9, hemoglobin 13.8, hematocrit  42.7, MCV 96.8, platelet 201 and lymphocytes 93.04 from 79.4 from  77.9 68.8 from 72.06 from 62.26 from 58.4 from 66.7 from 58.8.    2.    Re:  Pre-office CMP. Bilirubin .05 from 0.7 from 0.6 from 0.7 from 1.2 from 1.6 from 1.6 from 1.5.  GFR 68.1 from 64 from 70.9 mL/min.  3.    Re: FISH for CLL report pending  4.    Re:  Indications for treatment of CLL/SLL like fever of 100.4, drenching night sweats, unintentional weight loss of more than 10% over 6 months.  (B symptoms), rapid doubling of lymphocytes, symptomatic or bulky adenopathies or recurrent infections requiring hospitalization with intravenous antibiotics.  5.    Re: Interval lymphocyte 90.  6.    Re: Continue close observation, CLL/SLL.    7.   Obtain CBC with differential every 6 weeks.  8.   Plan of care discussed with patient.  Understanding expressed.  He agreed to proceed.    9.   Continue ongoing management per primary care physician and the other specialists.  10.  Return to office in 3 months with CMP and CBC and differential, same day.              I have reviewed the assessment and plan and verified the accuracy of it. No changes to assessment and plan since the information was documented. Stanley San MD 04/21/25         I spent 31 total minutes, face-to-face, caring for Rico morgan. Greater than 50% of this " time involved counseling and/or coordination of care as documented within this note.          cc:   Konstantin Fleming MD          (Suad Padron MD)          (Maggie Thompson MD)          (Fredy Waldrop MD)          (Yang Hernandez MD)          (Leonidas Ibanez MD)

## 2025-04-21 ENCOUNTER — OFFICE VISIT (OUTPATIENT)
Dept: ONCOLOGY | Facility: CLINIC | Age: 60
End: 2025-04-21
Payer: COMMERCIAL

## 2025-04-21 VITALS
WEIGHT: 209.4 LBS | TEMPERATURE: 97.8 F | OXYGEN SATURATION: 98 % | RESPIRATION RATE: 16 BRPM | SYSTOLIC BLOOD PRESSURE: 140 MMHG | BODY MASS INDEX: 27.75 KG/M2 | HEIGHT: 73 IN | HEART RATE: 76 BPM | DIASTOLIC BLOOD PRESSURE: 78 MMHG

## 2025-04-21 DIAGNOSIS — C91.10 CLL (CHRONIC LYMPHOCYTIC LEUKEMIA): Primary | ICD-10-CM

## 2025-04-21 LAB
CELLS ANALYZED: NORMAL
CELLS COUNTED: NORMAL
CLINICAL CYTOGENETICIST SPEC: NORMAL
GENETIC DISEASES BLD/T FISH: NORMAL
NARRATIVE DIAGNOSTIC REPORT-IMP: NORMAL
SPECIMEN SOURCE: NORMAL

## 2025-04-21 RX ORDER — ROSUVASTATIN CALCIUM 10 MG/1
1 TABLET, COATED ORAL DAILY
COMMUNITY
Start: 2025-03-26 | End: 2025-04-25

## 2025-05-30 ENCOUNTER — TELEPHONE (OUTPATIENT)
Dept: ONCOLOGY | Facility: CLINIC | Age: 60
End: 2025-05-30
Payer: COMMERCIAL

## 2025-06-09 ENCOUNTER — LAB (OUTPATIENT)
Dept: LAB | Facility: HOSPITAL | Age: 60
End: 2025-06-09
Payer: COMMERCIAL

## 2025-06-09 ENCOUNTER — RESULTS FOLLOW-UP (OUTPATIENT)
Dept: LAB | Facility: HOSPITAL | Age: 60
End: 2025-06-09
Payer: COMMERCIAL

## 2025-06-09 ENCOUNTER — TELEPHONE (OUTPATIENT)
Dept: ONCOLOGY | Facility: CLINIC | Age: 60
End: 2025-06-09
Payer: COMMERCIAL

## 2025-06-09 DIAGNOSIS — C91.10 CLL (CHRONIC LYMPHOCYTIC LEUKEMIA): ICD-10-CM

## 2025-06-09 LAB
BASOPHILS # BLD MANUAL: 0 10*3/MM3 (ref 0–0.2)
BASOPHILS NFR BLD MANUAL: 0 % (ref 0–1.5)
DEPRECATED RDW RBC AUTO: 51.5 FL (ref 37–54)
ELLIPTOCYTES BLD QL SMEAR: ABNORMAL
EOSINOPHIL # BLD MANUAL: 0 10*3/MM3 (ref 0–0.4)
EOSINOPHIL NFR BLD MANUAL: 0 % (ref 0.3–6.2)
ERYTHROCYTE [DISTWIDTH] IN BLOOD BY AUTOMATED COUNT: 14.1 % (ref 12.3–15.4)
HCT VFR BLD AUTO: 41 % (ref 37.5–51)
HGB BLD-MCNC: 12.9 G/DL (ref 13–17.7)
LYMPHOCYTES # BLD MANUAL: 88.9 10*3/MM3 (ref 0.7–3.1)
LYMPHOCYTES NFR BLD MANUAL: 1 % (ref 5–12)
MCH RBC QN AUTO: 31.1 PG (ref 26.6–33)
MCHC RBC AUTO-ENTMCNC: 31.5 G/DL (ref 31.5–35.7)
MCV RBC AUTO: 98.8 FL (ref 79–97)
MONOCYTES # BLD: 0.92 10*3/MM3 (ref 0.1–0.9)
NEUTROPHILS # BLD AUTO: 2.68 10*3/MM3 (ref 1.7–7)
NEUTROPHILS NFR BLD MANUAL: 2.9 % (ref 42.7–76)
PLAT MORPH BLD: NORMAL
PLATELET # BLD AUTO: 263 10*3/MM3 (ref 140–450)
PMV BLD AUTO: 9 FL (ref 6–12)
POIKILOCYTOSIS BLD QL SMEAR: ABNORMAL
POLYCHROMASIA BLD QL SMEAR: ABNORMAL
RBC # BLD AUTO: 4.15 10*6/MM3 (ref 4.14–5.8)
VARIANT LYMPHS NFR BLD MANUAL: 7.7 % (ref 0–5)
VARIANT LYMPHS NFR BLD MANUAL: 88.5 % (ref 19.6–45.3)
WBC MORPH BLD: NORMAL
WBC NRBC COR # BLD AUTO: 92.41 10*3/MM3 (ref 3.4–10.8)

## 2025-06-09 PROCEDURE — 36415 COLL VENOUS BLD VENIPUNCTURE: CPT

## 2025-06-09 PROCEDURE — 85007 BL SMEAR W/DIFF WBC COUNT: CPT

## 2025-06-09 PROCEDURE — 85025 COMPLETE CBC W/AUTO DIFF WBC: CPT

## 2025-06-09 NOTE — TELEPHONE ENCOUNTER
CRITICAL LAB VALUE  Received call from [facility] Hematology with CRITICAL LAB VALUE OF:    WBC: 92.41    This information was sent to Dr San for review.

## 2025-07-14 ENCOUNTER — LAB (OUTPATIENT)
Dept: LAB | Facility: HOSPITAL | Age: 60
End: 2025-07-14
Payer: COMMERCIAL

## 2025-07-14 DIAGNOSIS — C91.10 CLL (CHRONIC LYMPHOCYTIC LEUKEMIA): ICD-10-CM

## 2025-07-14 LAB
ALBUMIN SERPL-MCNC: 4.8 G/DL (ref 3.5–5.2)
ALBUMIN/GLOB SERPL: 1.9 G/DL
ALP SERPL-CCNC: 78 U/L (ref 39–117)
ALT SERPL W P-5'-P-CCNC: 15 U/L (ref 1–41)
ANION GAP SERPL CALCULATED.3IONS-SCNC: 11 MMOL/L (ref 5–15)
AST SERPL-CCNC: 22 U/L (ref 1–40)
BASOPHILS # BLD MANUAL: 0 10*3/MM3 (ref 0–0.2)
BASOPHILS NFR BLD MANUAL: 0 % (ref 0–1.5)
BILIRUB SERPL-MCNC: 0.7 MG/DL (ref 0–1.2)
BUN SERPL-MCNC: 25 MG/DL (ref 8–23)
BUN/CREAT SERPL: 20.5 (ref 7–25)
CALCIUM SPEC-SCNC: 9.8 MG/DL (ref 8.6–10.5)
CHLORIDE SERPL-SCNC: 104 MMOL/L (ref 98–107)
CO2 SERPL-SCNC: 25 MMOL/L (ref 22–29)
CREAT SERPL-MCNC: 1.22 MG/DL (ref 0.76–1.27)
DEPRECATED RDW RBC AUTO: 51.1 FL (ref 37–54)
EGFRCR SERPLBLD CKD-EPI 2021: 67.5 ML/MIN/1.73
EOSINOPHIL # BLD MANUAL: 0 10*3/MM3 (ref 0–0.4)
EOSINOPHIL NFR BLD MANUAL: 0 % (ref 0.3–6.2)
ERYTHROCYTE [DISTWIDTH] IN BLOOD BY AUTOMATED COUNT: 14.2 % (ref 12.3–15.4)
GLOBULIN UR ELPH-MCNC: 2.5 GM/DL
GLUCOSE SERPL-MCNC: 114 MG/DL (ref 65–99)
HCT VFR BLD AUTO: 43.3 % (ref 37.5–51)
HGB BLD-MCNC: 13.4 G/DL (ref 13–17.7)
LYMPHOCYTES # BLD MANUAL: 90.2 10*3/MM3 (ref 0.7–3.1)
LYMPHOCYTES NFR BLD MANUAL: 0 % (ref 5–12)
MCH RBC QN AUTO: 30.5 PG (ref 26.6–33)
MCHC RBC AUTO-ENTMCNC: 30.9 G/DL (ref 31.5–35.7)
MCV RBC AUTO: 98.4 FL (ref 79–97)
MONOCYTES # BLD: 0 10*3/MM3 (ref 0.1–0.9)
NEUTROPHILS # BLD AUTO: 2.79 10*3/MM3 (ref 1.7–7)
NEUTROPHILS NFR BLD MANUAL: 3 % (ref 42.7–76)
PLAT MORPH BLD: NORMAL
PLATELET # BLD AUTO: 215 10*3/MM3 (ref 140–450)
PMV BLD AUTO: 9.3 FL (ref 6–12)
POIKILOCYTOSIS BLD QL SMEAR: ABNORMAL
POTASSIUM SERPL-SCNC: 4.5 MMOL/L (ref 3.5–5.2)
PROT SERPL-MCNC: 7.3 G/DL (ref 6–8.5)
RBC # BLD AUTO: 4.4 10*6/MM3 (ref 4.14–5.8)
SODIUM SERPL-SCNC: 140 MMOL/L (ref 136–145)
VARIANT LYMPHS NFR BLD MANUAL: 5.1 % (ref 0–5)
VARIANT LYMPHS NFR BLD MANUAL: 91.9 % (ref 19.6–45.3)
WBC MORPH BLD: NORMAL
WBC NRBC COR # BLD AUTO: 92.99 10*3/MM3 (ref 3.4–10.8)

## 2025-07-14 PROCEDURE — 85007 BL SMEAR W/DIFF WBC COUNT: CPT

## 2025-07-14 PROCEDURE — 85025 COMPLETE CBC W/AUTO DIFF WBC: CPT

## 2025-07-14 PROCEDURE — 80053 COMPREHEN METABOLIC PANEL: CPT

## 2025-07-14 PROCEDURE — 36415 COLL VENOUS BLD VENIPUNCTURE: CPT

## 2025-07-14 NOTE — PROGRESS NOTES
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  He has been on surveillance. The patient is here alone.  History is obtained from the patient.  History is considered reliable.            Pertinent history.  1.   A 1.5 cm right level II node, followed by Dr. Hernandez.    2.   Elevated PSA. Followed by urology.  3.   History of hyperkalemia.  4.   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           Problem List Items Addressed This Visit    None    Oncology/Hematology History Overview Note   DIAGNOSTIC ABNORMALITIES:  CBC from [facility] 02/05/2013 revealed a WBC of 15.03, hemoglobin 15.6, hematocrit 43.3, MCV 88.4, platelet count 300,000. Absolute lymphocyte count was elevated at 9.7. ANC was 4.7.  PATHOLOGY:  [facility], Fluorescence in-situ Hybridization (FISH ) report, 04/04/2013. 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 Deletion 13q is the most common genetic abnormality in CLL, found in 36-64% of cases. As the sole abnormality, it has a good prognostic value. Both the disease free interval and overall survival are better than in cases with a normal karyotype because of slow disease progression. Monitoring for deletion 13q may be useful in assessing the patient's remission/relapse status. No gene rearrangements characteristic for t(11;14) or abnormalities involving the target regions of chromosomes 6,  11, 12, or 17 were detected. However, it should be noted that the FISH probes utilized in this analysis cannot entirely exclude the presence of other chromosomal abnormalities. Correlation with classic cytogenetics, clinical, flow cytometric, and morphological data is recommended, if available. 04/10/2013 JF  Peripheral blood, 04/03/2013 ([facility]).  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  Comments:  Flow cytometry identified an abnormal low kappa light chain-restricted B cell population showing coexpression of CD20, CD5, and CD23 without significant expression of CD10, FMC7 or CD79b.  The abnormal B cells are of a predominantly small size and account for approximately 96.2% of the B cells and 37.0% of the total white blood cells.  Roberson-Giemsa stained cytospin preparation shows atypical lymphocytes of a predominantly small to intermediate size.  These findings are indicative of involvement by a mature small B cell neoplasm. The cytomorphologic features and immunophenotype of the neoplastic B cells are consistent with CLL/SLL.  A 1.9% subset of the CD5-positive B cells coexpresses CD38. Of note, in the majority of patients, expression of CD38 on less than 30% of CD5-positive CLL/SLL B cells has been associated with more favorable prognosis. FISH studies for CLL associated genetic abnormalities and IgVH hypermutation analysis by PCR are underway and will be reported separately. Correlation with morphological findings and close clinical followup are recommended.   Abdominal ultrasound done at [facility] on 04/12/2013. IMPRESSION:  1. Mobile gallstone within the lumen of the gallbladder. No gallbladder wall thickening or pericholecystic fluid. No biliary dilatation. Findings suggest cholelithiasis. 2. Echogenic appearance to the liver parenchyma may represent diffuse fatty infiltration. 3. Normal appearance to the kidneys,  spleen, and visible portions of the pancreas.   CT of the abdomen and pelvis done on 03/30/2015 at [facility]. Impression: No CT evidence of neoplastic disease. No acute intra-abdominal/pelvic pathological process. Cholelithiasis. Colonic diverticulosis without CT evidence of acute diverticulitis. Bilateral spondylolysis at L5. Mild spondylolisthesis not excluded.   Ultrasound of the abdomen done on 03/30/2015 at [facility]. Impression: Cholelithiasis. Steatosis of the liver.       PREVIOUS INTERVENTIONS:  Observation.     CLL (chronic lymphocytic leukemia)   12/5/2019 Initial Diagnosis    CLL (chronic lymphocytic leukemia) (CMS/HCC)         PAST MEDICAL HISTORY:  ALLERGIES:  Allergies   Allergen Reactions    Flagyl [Metronidazole] Other (See Comments)     thrush       CURRENT MEDICATIONS:  Outpatient Encounter Medications as of 7/21/2025   Medication Sig Dispense Refill    fenofibrate (TRICOR) 145 MG tablet       NIACIN FLUSH FREE 500 MG capsule TK 1 C PO QD  5    OMEGA-3 FATTY ACIDS PO Take  by mouth.      rosuvastatin (CRESTOR) 10 MG tablet Take 1 tablet by mouth Daily.      valsartan-hydrochlorothiazide (DIOVAN-HCT) 160-25 MG per tablet TK 1 T PO QD       No facility-administered encounter medications on file as of 7/21/2025.     ADULT ILLNESSES:  Patient Active Problem List   Diagnosis Code    Family hx colonic polyps Z83.719    Family hx of colon cancer Z80.0    Hx of colonic polyp Z86.0100    Cervical lymphadenopathy R59.0    CLL (chronic lymphocytic leukemia) C91.10    Diverticulitis K57.92    Gallstones K80.20     SURGERIES:  Past Surgical History:   Procedure Laterality Date    CHOLECYSTECTOMY N/A 4/19/2024    Procedure: LAPAROSCOPIC ROBOTIC ASSISTED CHOLECYSTECTOMY WTIH PRE-OPERATIVE INDOCYANINE GREEN INJECTION;  Surgeon: Suad Padron MD;  Location: [facility];  Service: Robotics - DaVinci;  Laterality: N/A;    COLONOSCOPY  02/15/2013    COLONOSCOPY N/A 3/19/2018     Procedure: COLONOSCOPY WITH ANESTHESIA;  Surgeon: Leonidas Ibanez MD;  Location: [facility] ENDOSCOPY;  Service: Gastroenterology    COLONOSCOPY N/A 2/27/2023    Procedure: COLONOSCOPY WITH ANESTHESIA;  Surgeon: Leonidas Ibanez MD;  Location: [facility] ENDOSCOPY;  Service: Gastroenterology;  Laterality: N/A;  Pre: hx polyp, diverticulitis, family hx colon ca  Post: diverticulitis  Lonny Simon MD    EYE SURGERY      TONSILLECTOMY AND ADENOIDECTOMY       HEALTH MAINTENANCE ITEMS:  Health Maintenance Due   Topic Date Due    Pneumococcal Vaccine 50+ (1 of 2 - PCV) Never done    TDAP/TD VACCINES (1 - Tdap) Never done    HEPATITIS C SCREENING  Never done    ANNUAL PHYSICAL  Never done    COVID-19 Vaccine (4 - 2024-25 season) 09/01/2024       <no information>  Last Completed Colonoscopy            Needs Review       COLORECTAL CANCER SCREENING (COLONOSCOPY - Every 10 Years) Tentatively due on 2/27/2033 02/27/2023  Surgical Procedure: COLONOSCOPY    02/27/2023  COLONOSCOPY    03/19/2018  COLONOSCOPY    03/19/2018  Surgical Procedure: COLONOSCOPY    03/27/2017  Outside Claim: CHG BLOOD OCCULT,BY PEROXID,FECES,SINGLE, COLORECTAL SCREEN     Only the first 5 history entries have been loaded, but more history exists.                        Immunization History   Administered Date(s) Administered    COVID-19 (PFIZER) BIVALENT 12+YRS 10/20/2022    COVID-19 (PFIZER) Purple Cap Monovalent 08/26/2021, 09/27/2021     Last Completed Mammogram    This patient has no relevant Health Maintenance data.           FAMILY HISTORY:  Family History   Problem Relation Age of Onset    Colon cancer Maternal Aunt     Colon cancer Maternal Aunt     Colon polyps Maternal Uncle     Colon cancer Paternal Grandmother      SOCIAL HISTORY:  Social History     Socioeconomic History    Marital status:    Tobacco Use    Smoking status: Never    Smokeless tobacco: Never   Vaping Use    Vaping status: Never Used   Substance and Sexual  "Activity    Alcohol use: Yes     Comment: rare    Drug use: No    Sexual activity: Defer       REVIEW OF SYSTEMS:    Review of Systems   Constitutional:  Negative for fatigue, fever and unexpected weight loss.        \"Feeling okay.\"   HENT:  Negative for congestion and swollen glands.    Eyes:  Negative for redness and visual disturbance.   Respiratory:  Negative for shortness of breath and wheezing.    Cardiovascular:  Negative for chest pain and leg swelling.   Gastrointestinal:  Negative for abdominal pain, nausea and vomiting.   Endocrine: Negative for cold intolerance and heat intolerance.   Genitourinary:  Negative for difficulty urinating and dysuria.   Musculoskeletal:  Negative for gait problem.   Skin:  Negative for pallor.   Allergic/Immunologic: Negative for food allergies.   Neurological:  Negative for facial asymmetry, speech difficulty and confusion.   Hematological:  Negative for adenopathy. Does not bruise/bleed easily.   Psychiatric/Behavioral:  Negative for agitation and hallucinations. The patient is not nervous/anxious.          VITAL SIGNS: /72   Pulse 88   Temp 97.6 °F (36.4 °C)   Resp 16   Ht 186 cm (73.23\")   Wt 95 kg (209 lb 6.4 oz)   SpO2 97%   BMI 27.45 kg/m²  Body surface area is 2.2 meters squared.   Pain Score    07/21/25 0803   PainSc: 0-No pain           PHYSICAL EXAMINATION:     Physical Exam  Vitals reviewed.   Constitutional:       General: He is not in acute distress.  HENT:      Head: Normocephalic and atraumatic.   Eyes:      General: No scleral icterus.  Cardiovascular:      Rate and Rhythm: Normal rate.   Pulmonary:      Effort: No respiratory distress.      Breath sounds: No wheezing.   Abdominal:      General: Bowel sounds are normal.      Palpations: Abdomen is soft.   Musculoskeletal:         General: No swelling.   Skin:     Coloration: Skin is not pale.   Neurological:      Mental Status: He is alert and oriented to person, place, and time.   Psychiatric:   " "      Mood and Affect: Mood normal.         Behavior: Behavior normal.         Thought Content: Thought content normal.         Judgment: Judgment normal.         LABS    Lab Results - Last 18 Months   Lab Units 07/14/25  0706 06/09/25  0709 04/14/25  0740 01/20/25  0703 10/07/24  0726 07/15/24  0708 04/08/24  0709   HEMOGLOBIN g/dL 13.4 12.9* 13.8 13.6 13.9 12.9* 13.8   HEMATOCRIT % 43.3 41.0 42.7 42.0 43.3 40.4 43.9   MCV fL 98.4* 98.8* 96.8 97.2* 96.7 96.7 96.9   WBC 10*3/mm3 92.99* 92.41* 97.12* 83.61* 81.24* 74.93* 80.07*   RDW % 14.2 14.1 13.9 14.2 13.9 14.5 14.1   MPV fL 9.3 9.0 9.3 9.6 9.4 9.5 9.3   PLATELETS 10*3/mm3 215 263 201 193 201 235 276   NEUTROS ABS 10*3/mm3 2.79 2.68 4.05 4.18 3.33 6.07 7.21*   EOS ABS 10*3/mm3 0.00 0.00 0.00 0.00 0.00 0.00  --    BASOS ABS 10*3/mm3 0.00 0.00 0.00 0.00 0.00 0.00  --    NEUTROPHIL % % 3.0* 2.9* 3.3* 5.0* 4.1* 8.1* 9.0*   MONOCYTES % % 0.0* 1.0* 0.0* 0.0* 0.0* 0.0* 1.0*   BASOPHIL % % 0.0 0.0 0.0 0.0 0.0 0.0  --    ATYP LYMPH % % 5.1* 7.7* 0.8 16.0*  --   --  17.0*   ANISOCYTOSIS   --   --  Slight/1+ Slight/1+ Slight/1+ Slight/1+ Slight/1+       Lab Results - Last 18 Months   Lab Units 07/14/25  0706 04/14/25  0740 10/07/24  0726 04/08/24  0709   GLUCOSE mg/dL 114* 106* 121* 123*   SODIUM mmol/L 140 140 139 139   POTASSIUM mmol/L 4.5 4.5 4.6 3.9   CO2 mmol/L 25.0 27.0 28.0 29.0   CHLORIDE mmol/L 104 101 103 100   ANION GAP mmol/L 11.0 12.0 8.0 10.0   CREATININE mg/dL 1.22 1.21 1.17 1.13   BUN mg/dL 25.0* 29* 25* 23   BUN / CREAT RATIO  20.5 24.0 21.4 20.4   CALCIUM mg/dL 9.8 9.8 9.7 10.0   ALK PHOS U/L 78 88 81 92   TOTAL PROTEIN g/dL 7.3 6.9 6.6 7.2   ALT (SGPT) U/L 15 17 13 20   AST (SGOT) U/L 22 20 21 24   BILIRUBIN mg/dL 0.7 0.5 0.6 0.7   ALBUMIN g/dL 4.8 4.7 4.7 4.4   GLOBULIN gm/dL 2.5 2.2 1.9 2.8       No results for input(s): \"MSPIKE\", \"KAPPALAMB\", \"IGLFLC\", \"URICACID\", \"FREEKAPPAL\", \"CEA\", \"LDH\", \"REFLABREPO\" in the last 88130 hours.    No results for " "input(s): \"IRON\", \"TIBC\", \"LABIRON\", \"FERRITIN\", \"Y1CDMSW\", \"TSH\", \"FOLATE\" in the last 92590 hours.    Invalid input(s): \"VITB12\"      Rico Enrique reports a pain score of 0.            ASSESSMENT:  1.   Chronic lymphocytic leukemia/small lymphocytic lymphoma (CLL/SLL) monoclonal kappa.  Stage 0.  Asymptomatic.  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  Complications: None.  Treatment status: On surveillance.  Prognosis: Good.  2.   Performance status of 0.  3.   Elevated liver function tests secondary to statin, cholelithiasis, and fatty liver.  On surveillance           PLAN:  1.    Re:   Heme status.  WBC 92.9 from  92.4 from 97.1 from 83.6 from 81.24 from 74.9, hemoglobin 13.4 from 12.9 from 13.8, hematocrit 43.3 from 41 from 42.7, MCV 98.4 from 98.8 from 96.8, platelet 215 from 263 from 201 and lymphocytes 90.2 from 88.9 from 93.04 from 79.4 from 77.9 from 68.8 from 72.06 from 62.26 from 58.4 from 66.7 from 58.8.    2.    Re:  Pre-office CMP. Bilirubin 0.7 from 0.5 from 0.7 from 0.6 from 0.7 from 1.2 from 1.6 from 1.6 from 1.5.  GFR 68.1 from 64 from 70.9 mL/min.  3.    Re: FISH for CLL report on 4/21/2025. Deletion of the tumor suppressor loci at 13q is a  common finding in CLL. Patients with 13q deletions as the  sole anomaly detected by FISH are reported to have the longest survival time   4.    Re:  Indications for treatment of CLL/SLL like fever of 100.4, unintentional weight loss of more than 10% over 6 months, drenching night sweats, rapid doubling of lymphocytes, symptomatic or bulky adenopathies or recurrent infections requiring hospitalization with intravenous antibiotics.  5.    Re: Interval lymphocyte 90.2 from 88.9.  6.    Re: Continue close observation, CLL/SLL.    7.   Order CBC with differential every 6 weeks.  8.   Plan of care discussed with patient.  Understanding expressed.  Patient agreeable to proceed.    9.   Continue ongoing management per " primary care physician and the other specialists.  10.  Return to office in 3 months with CMP and CBC and differential, same day.            I have reviewed the assessment and plan and verified the accuracy of it. No changes to assessment and plan since the information was documented. Stanley San MD 07/21/25         I spent 32 total minutes, face-to-face, caring for Rico morgan. Greater than 50% of this time involved counseling and/or coordination of care as documented within this note.           cc:   Konstantin Fleming MD          (Suad Padron MD)          (Maggie Thompson MD)          (Fredy Waldrop MD)          (Yang Hernandez MD)          (Leonidas Ibanez MD)

## 2025-07-21 ENCOUNTER — OFFICE VISIT (OUTPATIENT)
Dept: ONCOLOGY | Facility: CLINIC | Age: 60
End: 2025-07-21
Payer: COMMERCIAL

## 2025-07-21 VITALS
HEIGHT: 73 IN | DIASTOLIC BLOOD PRESSURE: 72 MMHG | WEIGHT: 209.4 LBS | BODY MASS INDEX: 27.75 KG/M2 | OXYGEN SATURATION: 97 % | HEART RATE: 88 BPM | TEMPERATURE: 97.6 F | SYSTOLIC BLOOD PRESSURE: 138 MMHG | RESPIRATION RATE: 16 BRPM

## 2025-07-21 DIAGNOSIS — C91.10 CLL (CHRONIC LYMPHOCYTIC LEUKEMIA): Primary | ICD-10-CM

## (undated) DEVICE — ST TBG PNEUMOCLEAR EVAC SMOKE HIFLO

## (undated) DEVICE — DAVINCI: Brand: MEDLINE INDUSTRIES, INC.

## (undated) DEVICE — YANKAUER,BULB TIP WITH VENT: Brand: ARGYLE

## (undated) DEVICE — GLOVE,SURG,SENSICARE,ALOE,LF,PF,6: Brand: MEDLINE

## (undated) DEVICE — TBG SMPL FLTR LINE NASL 02/C02 A/ BX/100

## (undated) DEVICE — THE CHANNEL CLEANING BRUSH IS A NYLON FLEXI BRUSH ATTACHED TO A FLEXIBLE PLASTIC SHEATH DESIGNED TO SAFELY REMOVE DEBRIS FROM FLEXIBLE ENDOSCOPES.

## (undated) DEVICE — SENSR O2 OXIMAX FNGR A/ 18IN NONSTR

## (undated) DEVICE — ARM DRAPE

## (undated) DEVICE — MSK O2 MD CONCENTR A/ LF 7FT 1P/U

## (undated) DEVICE — MASK,OXYGEN,MED CONC,ADLT,7' TUB, UC: Brand: PENDING

## (undated) DEVICE — ADHS SKIN PREMIERPRO EXOFIN TOPICAL HI/VISC .5ML

## (undated) DEVICE — NDL HYPO PRECISIONGLIDE REG 21G 1 1/2

## (undated) DEVICE — BLADELESS OBTURATOR: Brand: WECK VISTA

## (undated) DEVICE — SYR LL TP 10ML STRL

## (undated) DEVICE — GLV SURG SENSICARE W/ALOE PF LF 6.5 STRL

## (undated) DEVICE — APPL CHLORAPREP HI/LITE 26ML ORNG

## (undated) DEVICE — CUFF,BP,DISP,1 TUBE,ADULT,HP: Brand: MEDLINE

## (undated) DEVICE — TISSUE RETRIEVAL SYSTEM: Brand: INZII RETRIEVAL SYSTEM

## (undated) DEVICE — SEAL

## (undated) DEVICE — Device: Brand: DEFENDO AIR/WATER/SUCTION AND BIOPSY VALVE

## (undated) DEVICE — ELECTRD BLD EZ CLN MOD XLNG 2.75IN

## (undated) DEVICE — KT CLN CLEANOR SCPE

## (undated) DEVICE — ENDOGATOR AUXILIARY WATER JET CONNECTOR: Brand: ENDOGATOR

## (undated) DEVICE — SUT MNCRYL 4/0 PS2 27IN UD MCP426H